# Patient Record
Sex: MALE | Race: WHITE | NOT HISPANIC OR LATINO | Employment: OTHER | URBAN - METROPOLITAN AREA
[De-identification: names, ages, dates, MRNs, and addresses within clinical notes are randomized per-mention and may not be internally consistent; named-entity substitution may affect disease eponyms.]

---

## 2017-01-13 ENCOUNTER — AMBULATORY - HEALTHEAST (OUTPATIENT)
Dept: CARDIOLOGY | Facility: CLINIC | Age: 82
End: 2017-01-13

## 2017-01-13 DIAGNOSIS — I48.20 CHRONIC ATRIAL FIBRILLATION (H): ICD-10-CM

## 2017-01-18 ENCOUNTER — AMBULATORY - HEALTHEAST (OUTPATIENT)
Dept: CARDIOLOGY | Facility: CLINIC | Age: 82
End: 2017-01-18

## 2017-01-18 ENCOUNTER — COMMUNICATION - HEALTHEAST (OUTPATIENT)
Dept: CARDIOLOGY | Facility: CLINIC | Age: 82
End: 2017-01-18

## 2017-01-18 DIAGNOSIS — I48.20 CHRONIC ATRIAL FIBRILLATION (H): ICD-10-CM

## 2017-01-27 ENCOUNTER — AMBULATORY - HEALTHEAST (OUTPATIENT)
Dept: CARDIOLOGY | Facility: CLINIC | Age: 82
End: 2017-01-27

## 2017-01-27 DIAGNOSIS — I48.20 CHRONIC ATRIAL FIBRILLATION (H): ICD-10-CM

## 2017-02-03 ENCOUNTER — AMBULATORY - HEALTHEAST (OUTPATIENT)
Dept: CARDIOLOGY | Facility: CLINIC | Age: 82
End: 2017-02-03

## 2017-02-03 DIAGNOSIS — I48.20 CHRONIC ATRIAL FIBRILLATION (H): ICD-10-CM

## 2017-02-17 ENCOUNTER — AMBULATORY - HEALTHEAST (OUTPATIENT)
Dept: CARDIOLOGY | Facility: CLINIC | Age: 82
End: 2017-02-17

## 2017-02-17 DIAGNOSIS — I48.20 CHRONIC ATRIAL FIBRILLATION (H): ICD-10-CM

## 2017-03-01 ENCOUNTER — AMBULATORY - HEALTHEAST (OUTPATIENT)
Dept: CARDIOLOGY | Facility: CLINIC | Age: 82
End: 2017-03-01

## 2017-03-03 ENCOUNTER — AMBULATORY - HEALTHEAST (OUTPATIENT)
Dept: CARDIOLOGY | Facility: CLINIC | Age: 82
End: 2017-03-03

## 2017-03-03 ENCOUNTER — OFFICE VISIT - HEALTHEAST (OUTPATIENT)
Dept: CARDIOLOGY | Facility: CLINIC | Age: 82
End: 2017-03-03

## 2017-03-03 DIAGNOSIS — I48.20 CHRONIC ATRIAL FIBRILLATION (H): ICD-10-CM

## 2017-03-03 DIAGNOSIS — I51.3 LEFT ATRIAL THROMBUS: ICD-10-CM

## 2017-03-03 DIAGNOSIS — I35.1 AORTIC VALVE INSUFFICIENCY, UNSPECIFIED ETIOLOGY: ICD-10-CM

## 2017-03-03 ASSESSMENT — MIFFLIN-ST. JEOR: SCORE: 1427.67

## 2017-03-08 ENCOUNTER — AMBULATORY - HEALTHEAST (OUTPATIENT)
Dept: CARDIOLOGY | Facility: CLINIC | Age: 82
End: 2017-03-08

## 2017-03-08 ENCOUNTER — SURGERY - HEALTHEAST (OUTPATIENT)
Dept: CARDIOLOGY | Facility: CLINIC | Age: 82
End: 2017-03-08

## 2017-03-08 ENCOUNTER — COMMUNICATION - HEALTHEAST (OUTPATIENT)
Dept: CARDIOLOGY | Facility: CLINIC | Age: 82
End: 2017-03-08

## 2017-03-08 DIAGNOSIS — I48.21 PERMANENT ATRIAL FIBRILLATION (H): ICD-10-CM

## 2017-03-08 DIAGNOSIS — I51.3 LEFT ATRIAL THROMBUS: ICD-10-CM

## 2017-03-17 ENCOUNTER — AMBULATORY - HEALTHEAST (OUTPATIENT)
Dept: CARDIOLOGY | Facility: CLINIC | Age: 82
End: 2017-03-17

## 2017-03-17 DIAGNOSIS — I48.20 CHRONIC ATRIAL FIBRILLATION (H): ICD-10-CM

## 2017-03-20 ENCOUNTER — COMMUNICATION - HEALTHEAST (OUTPATIENT)
Dept: CARDIOLOGY | Facility: CLINIC | Age: 82
End: 2017-03-20

## 2017-03-24 ENCOUNTER — AMBULATORY - HEALTHEAST (OUTPATIENT)
Dept: CARDIOLOGY | Facility: CLINIC | Age: 82
End: 2017-03-24

## 2017-03-24 DIAGNOSIS — I48.20 CHRONIC ATRIAL FIBRILLATION (H): ICD-10-CM

## 2017-03-31 ENCOUNTER — AMBULATORY - HEALTHEAST (OUTPATIENT)
Dept: CARDIOLOGY | Facility: CLINIC | Age: 82
End: 2017-03-31

## 2017-03-31 DIAGNOSIS — I48.20 CHRONIC ATRIAL FIBRILLATION (H): ICD-10-CM

## 2017-04-14 ENCOUNTER — AMBULATORY - HEALTHEAST (OUTPATIENT)
Dept: CARDIOLOGY | Facility: CLINIC | Age: 82
End: 2017-04-14

## 2017-04-14 ENCOUNTER — OFFICE VISIT - HEALTHEAST (OUTPATIENT)
Dept: CARDIOLOGY | Facility: CLINIC | Age: 82
End: 2017-04-14

## 2017-04-14 DIAGNOSIS — Z86.73 HX OF STROKE WITHOUT RESIDUAL DEFICITS: ICD-10-CM

## 2017-04-14 DIAGNOSIS — I51.3 THROMBUS OF LEFT ATRIAL APPENDAGE: ICD-10-CM

## 2017-04-14 ASSESSMENT — MIFFLIN-ST. JEOR: SCORE: 1408.4

## 2017-04-21 ENCOUNTER — HOSPITAL ENCOUNTER (OUTPATIENT)
Dept: MRI IMAGING | Facility: HOSPITAL | Age: 82
Discharge: HOME | End: 2017-04-21

## 2017-04-21 DIAGNOSIS — Z86.73 HX OF STROKE WITHOUT RESIDUAL DEFICITS: ICD-10-CM

## 2017-04-24 ENCOUNTER — COMMUNICATION - HEALTHEAST (OUTPATIENT)
Dept: CARDIOLOGY | Facility: CLINIC | Age: 82
End: 2017-04-24

## 2017-04-27 ENCOUNTER — AMBULATORY - HEALTHEAST (OUTPATIENT)
Dept: CARDIOLOGY | Facility: CLINIC | Age: 82
End: 2017-04-27

## 2017-04-28 ENCOUNTER — AMBULATORY - HEALTHEAST (OUTPATIENT)
Dept: CARDIOLOGY | Facility: CLINIC | Age: 82
End: 2017-04-28

## 2017-04-28 DIAGNOSIS — I48.20 CHRONIC ATRIAL FIBRILLATION (H): ICD-10-CM

## 2017-06-08 ENCOUNTER — COMMUNICATION - HEALTHEAST (OUTPATIENT)
Dept: CARDIOLOGY | Facility: CLINIC | Age: 82
End: 2017-06-08

## 2017-06-08 DIAGNOSIS — I48.91 A-FIB (H): ICD-10-CM

## 2017-06-08 DIAGNOSIS — I51.3 THROMBUS OF LEFT ATRIAL APPENDAGE: ICD-10-CM

## 2017-06-09 ENCOUNTER — COMMUNICATION - HEALTHEAST (OUTPATIENT)
Dept: CARDIOLOGY | Facility: CLINIC | Age: 82
End: 2017-06-09

## 2017-06-13 ENCOUNTER — RECORDS - HEALTHEAST (OUTPATIENT)
Dept: ADMINISTRATIVE | Facility: OTHER | Age: 82
End: 2017-06-13

## 2017-06-15 ENCOUNTER — RECORDS - HEALTHEAST (OUTPATIENT)
Dept: LAB | Facility: CLINIC | Age: 82
End: 2017-06-15

## 2017-06-16 ENCOUNTER — HOSPITAL ENCOUNTER (OUTPATIENT)
Dept: PET IMAGING | Facility: HOSPITAL | Age: 82
Discharge: HOME | End: 2017-06-16
Attending: PSYCHIATRY & NEUROLOGY

## 2017-06-16 DIAGNOSIS — R41.89 COGNITIVE DECLINE: ICD-10-CM

## 2017-06-16 LAB — SYPHILIS RPR SCREEN - HISTORICAL: NORMAL

## 2017-06-16 ASSESSMENT — MIFFLIN-ST. JEOR: SCORE: 1415.21

## 2017-07-13 ENCOUNTER — COMMUNICATION - HEALTHEAST (OUTPATIENT)
Dept: CARDIOLOGY | Facility: CLINIC | Age: 82
End: 2017-07-13

## 2017-08-18 ENCOUNTER — OFFICE VISIT - HEALTHEAST (OUTPATIENT)
Dept: CARDIOLOGY | Facility: CLINIC | Age: 82
End: 2017-08-18

## 2017-08-18 DIAGNOSIS — I51.3 LEFT ATRIAL THROMBUS: ICD-10-CM

## 2017-08-18 ASSESSMENT — MIFFLIN-ST. JEOR: SCORE: 1451.49

## 2017-09-05 ENCOUNTER — COMMUNICATION - HEALTHEAST (OUTPATIENT)
Dept: CARDIOLOGY | Facility: CLINIC | Age: 82
End: 2017-09-05

## 2017-09-05 DIAGNOSIS — I51.3 THROMBUS OF LEFT ATRIAL APPENDAGE: ICD-10-CM

## 2017-09-05 DIAGNOSIS — I48.91 A-FIB (H): ICD-10-CM

## 2017-09-08 ENCOUNTER — RECORDS - HEALTHEAST (OUTPATIENT)
Dept: ADMINISTRATIVE | Facility: OTHER | Age: 82
End: 2017-09-08

## 2017-10-13 ENCOUNTER — AMBULATORY - HEALTHEAST (OUTPATIENT)
Dept: CARDIOLOGY | Facility: CLINIC | Age: 82
End: 2017-10-13

## 2017-10-13 ENCOUNTER — RECORDS - HEALTHEAST (OUTPATIENT)
Dept: ADMINISTRATIVE | Facility: OTHER | Age: 82
End: 2017-10-13

## 2017-10-17 ENCOUNTER — OFFICE VISIT - HEALTHEAST (OUTPATIENT)
Dept: CARDIOLOGY | Facility: CLINIC | Age: 82
End: 2017-10-17

## 2017-10-17 DIAGNOSIS — Z79.01 ALTERATION IN ANTICOAGULATION: ICD-10-CM

## 2017-10-17 DIAGNOSIS — R55 SYNCOPE: ICD-10-CM

## 2017-10-17 DIAGNOSIS — Z51.81 ALTERATION IN ANTICOAGULATION: ICD-10-CM

## 2017-10-17 DIAGNOSIS — I48.20 CHRONIC ATRIAL FIBRILLATION (H): ICD-10-CM

## 2017-10-17 DIAGNOSIS — I51.89 LEFT ATRIAL MASS: ICD-10-CM

## 2017-10-17 ASSESSMENT — MIFFLIN-ST. JEOR: SCORE: 1460.11

## 2017-10-18 ENCOUNTER — COMMUNICATION - HEALTHEAST (OUTPATIENT)
Dept: NURSING | Facility: CLINIC | Age: 82
End: 2017-10-18

## 2017-10-18 DIAGNOSIS — I48.20 CHRONIC ATRIAL FIBRILLATION (H): ICD-10-CM

## 2017-10-23 ENCOUNTER — HOSPITAL ENCOUNTER (OUTPATIENT)
Dept: CARDIOLOGY | Facility: CLINIC | Age: 82
Discharge: HOME | End: 2017-10-23
Attending: INTERNAL MEDICINE

## 2017-10-23 DIAGNOSIS — I48.20 CHRONIC ATRIAL FIBRILLATION (H): ICD-10-CM

## 2017-10-26 ENCOUNTER — RECORDS - HEALTHEAST (OUTPATIENT)
Dept: LAB | Facility: CLINIC | Age: 82
End: 2017-10-26

## 2017-10-26 ENCOUNTER — COMMUNICATION - HEALTHEAST (OUTPATIENT)
Dept: CARDIOLOGY | Facility: CLINIC | Age: 82
End: 2017-10-26

## 2017-10-26 LAB
CHOLEST SERPL-MCNC: 221 MG/DL
FASTING STATUS PATIENT QL REPORTED: NO
HDLC SERPL-MCNC: 41 MG/DL
LDLC SERPL CALC-MCNC: 160 MG/DL
TRIGL SERPL-MCNC: 99 MG/DL

## 2017-10-27 ENCOUNTER — COMMUNICATION - HEALTHEAST (OUTPATIENT)
Dept: CARDIOLOGY | Facility: CLINIC | Age: 82
End: 2017-10-27

## 2017-11-20 ENCOUNTER — COMMUNICATION - HEALTHEAST (OUTPATIENT)
Dept: CARDIOLOGY | Facility: CLINIC | Age: 82
End: 2017-11-20

## 2018-03-07 ENCOUNTER — COMMUNICATION - HEALTHEAST (OUTPATIENT)
Dept: CARDIOLOGY | Facility: CLINIC | Age: 83
End: 2018-03-07

## 2018-03-07 DIAGNOSIS — I48.91 A-FIB (H): ICD-10-CM

## 2018-04-10 ENCOUNTER — RECORDS - HEALTHEAST (OUTPATIENT)
Dept: ADMINISTRATIVE | Facility: OTHER | Age: 83
End: 2018-04-10

## 2018-04-10 ENCOUNTER — AMBULATORY - HEALTHEAST (OUTPATIENT)
Dept: CARDIOLOGY | Facility: CLINIC | Age: 83
End: 2018-04-10

## 2018-04-13 ENCOUNTER — OFFICE VISIT - HEALTHEAST (OUTPATIENT)
Dept: CARDIOLOGY | Facility: CLINIC | Age: 83
End: 2018-04-13

## 2018-04-13 DIAGNOSIS — I48.20 CHRONIC ATRIAL FIBRILLATION (H): ICD-10-CM

## 2018-04-13 DIAGNOSIS — I10 ESSENTIAL HYPERTENSION: ICD-10-CM

## 2018-04-13 ASSESSMENT — MIFFLIN-ST. JEOR: SCORE: 1435.61

## 2018-04-18 ENCOUNTER — COMMUNICATION - HEALTHEAST (OUTPATIENT)
Dept: CARDIOLOGY | Facility: CLINIC | Age: 83
End: 2018-04-18

## 2018-04-19 ENCOUNTER — SURGERY - HEALTHEAST (OUTPATIENT)
Dept: CARDIOLOGY | Facility: CLINIC | Age: 83
End: 2018-04-19

## 2018-04-19 ENCOUNTER — AMBULATORY - HEALTHEAST (OUTPATIENT)
Dept: CARDIOLOGY | Facility: CLINIC | Age: 83
End: 2018-04-19

## 2018-04-19 DIAGNOSIS — I48.91 A-FIB (H): ICD-10-CM

## 2018-04-23 ENCOUNTER — AMBULATORY - HEALTHEAST (OUTPATIENT)
Dept: CARDIOLOGY | Facility: CLINIC | Age: 83
End: 2018-04-23

## 2018-04-23 DIAGNOSIS — Z00.6 RESEARCH EXAM: ICD-10-CM

## 2018-04-23 ASSESSMENT — MIFFLIN-ST. JEOR: SCORE: 1478.13

## 2018-04-24 LAB
ATRIAL RATE - MUSE: 357 BPM
DIASTOLIC BLOOD PRESSURE - MUSE: NORMAL MMHG
INTERPRETATION ECG - MUSE: NORMAL
P AXIS - MUSE: NORMAL DEGREES
PR INTERVAL - MUSE: NORMAL MS
QRS DURATION - MUSE: 96 MS
QT - MUSE: 410 MS
QTC - MUSE: 445 MS
R AXIS - MUSE: -54 DEGREES
SYSTOLIC BLOOD PRESSURE - MUSE: NORMAL MMHG
T AXIS - MUSE: -21 DEGREES
VENTRICULAR RATE- MUSE: 71 BPM

## 2018-04-30 ENCOUNTER — HOSPITAL ENCOUNTER (OUTPATIENT)
Dept: CARDIOLOGY | Facility: CLINIC | Age: 83
Discharge: HOME | End: 2018-04-30
Attending: INTERNAL MEDICINE

## 2018-05-14 ENCOUNTER — RECORDS - HEALTHEAST (OUTPATIENT)
Dept: LAB | Facility: CLINIC | Age: 83
End: 2018-05-14

## 2018-05-14 LAB
ALBUMIN SERPL-MCNC: 3.5 G/DL (ref 3.5–5)
ALP SERPL-CCNC: 65 U/L (ref 45–120)
ALT SERPL W P-5'-P-CCNC: 17 U/L (ref 0–45)
ANION GAP SERPL CALCULATED.3IONS-SCNC: 9 MMOL/L (ref 5–18)
AST SERPL W P-5'-P-CCNC: 23 U/L (ref 0–40)
BILIRUB SERPL-MCNC: 1 MG/DL (ref 0–1)
BUN SERPL-MCNC: 22 MG/DL (ref 8–28)
CALCIUM SERPL-MCNC: 10.2 MG/DL (ref 8.5–10.5)
CHLORIDE BLD-SCNC: 104 MMOL/L (ref 98–107)
CO2 SERPL-SCNC: 28 MMOL/L (ref 22–31)
CREAT SERPL-MCNC: 1.03 MG/DL (ref 0.7–1.3)
GFR SERPL CREATININE-BSD FRML MDRD: >60 ML/MIN/1.73M2
GLUCOSE BLD-MCNC: 79 MG/DL (ref 70–125)
POTASSIUM BLD-SCNC: 4.7 MMOL/L (ref 3.5–5)
PROT SERPL-MCNC: 7 G/DL (ref 6–8)
SODIUM SERPL-SCNC: 141 MMOL/L (ref 136–145)

## 2018-05-16 ENCOUNTER — COMMUNICATION - HEALTHEAST (OUTPATIENT)
Dept: CARDIOLOGY | Facility: CLINIC | Age: 83
End: 2018-05-16

## 2018-05-22 ENCOUNTER — RECORDS - HEALTHEAST (OUTPATIENT)
Dept: ADMINISTRATIVE | Facility: OTHER | Age: 83
End: 2018-05-22

## 2018-05-22 ENCOUNTER — AMBULATORY - HEALTHEAST (OUTPATIENT)
Dept: CARDIOLOGY | Facility: CLINIC | Age: 83
End: 2018-05-22

## 2018-05-29 ENCOUNTER — OFFICE VISIT - HEALTHEAST (OUTPATIENT)
Dept: CARDIOLOGY | Facility: CLINIC | Age: 83
End: 2018-05-29

## 2018-05-29 DIAGNOSIS — Z51.81 ALTERATION IN ANTICOAGULATION: ICD-10-CM

## 2018-05-29 DIAGNOSIS — I48.20 CHRONIC ATRIAL FIBRILLATION (H): ICD-10-CM

## 2018-05-29 DIAGNOSIS — I51.89 LEFT ATRIAL MASS: ICD-10-CM

## 2018-05-29 DIAGNOSIS — Z79.01 ALTERATION IN ANTICOAGULATION: ICD-10-CM

## 2018-05-29 DIAGNOSIS — I35.1 AORTIC VALVE INSUFFICIENCY, ETIOLOGY OF CARDIAC VALVE DISEASE UNSPECIFIED: ICD-10-CM

## 2018-05-29 ASSESSMENT — MIFFLIN-ST. JEOR: SCORE: 1460.57

## 2018-06-02 ENCOUNTER — COMMUNICATION - HEALTHEAST (OUTPATIENT)
Dept: CARDIOLOGY | Facility: CLINIC | Age: 83
End: 2018-06-02

## 2018-06-02 DIAGNOSIS — I48.91 A-FIB (H): ICD-10-CM

## 2018-08-22 ENCOUNTER — COMMUNICATION - HEALTHEAST (OUTPATIENT)
Dept: NURSING | Facility: CLINIC | Age: 83
End: 2018-08-22

## 2018-08-22 DIAGNOSIS — I48.20 CHRONIC ATRIAL FIBRILLATION (H): ICD-10-CM

## 2018-09-24 ENCOUNTER — OFFICE VISIT - HEALTHEAST (OUTPATIENT)
Dept: GERIATRICS | Facility: CLINIC | Age: 83
End: 2018-09-24

## 2018-09-24 ENCOUNTER — AMBULATORY - HEALTHEAST (OUTPATIENT)
Dept: ADMINISTRATIVE | Facility: CLINIC | Age: 83
End: 2018-09-24

## 2018-09-24 DIAGNOSIS — I63.81 THALAMIC INFARCTION (H): ICD-10-CM

## 2018-09-24 DIAGNOSIS — Z71.89 ADVANCED CARE PLANNING/COUNSELING DISCUSSION: ICD-10-CM

## 2018-09-24 DIAGNOSIS — I51.89 LEFT ATRIAL MASS: ICD-10-CM

## 2018-09-24 RX ORDER — CHLORAL HYDRATE 500 MG
2 CAPSULE ORAL DAILY
Status: SHIPPED | COMMUNITY
Start: 2018-09-24

## 2018-09-24 RX ORDER — MAGNESIUM 200 MG
200 TABLET ORAL DAILY
Status: ON HOLD | COMMUNITY
Start: 2018-09-24 | End: 2023-01-01

## 2018-09-24 RX ORDER — MULTIVIT-MIN/IRON FUM/FOLIC AC 7.5 MG-4
1 TABLET ORAL DAILY
Status: SHIPPED | COMMUNITY
Start: 2018-09-24 | End: 2023-01-01

## 2018-09-24 RX ORDER — ASCORBIC ACID 500 MG
500 TABLET ORAL DAILY
Status: ON HOLD | COMMUNITY
Start: 2018-09-24 | End: 2023-01-01

## 2018-09-24 RX ORDER — VIT C/HESPERIDIN/BIOFLAVONOIDS 500-100 MG
30 TABLET ORAL DAILY PRN
Status: ON HOLD | COMMUNITY
Start: 2018-09-24 | End: 2023-01-01

## 2018-09-25 ENCOUNTER — OFFICE VISIT - HEALTHEAST (OUTPATIENT)
Dept: GERIATRICS | Facility: CLINIC | Age: 83
End: 2018-09-25

## 2018-09-25 DIAGNOSIS — H35.30 MACULAR DEGENERATION: ICD-10-CM

## 2018-09-25 DIAGNOSIS — I63.81 THALAMIC INFARCTION (H): ICD-10-CM

## 2018-09-25 DIAGNOSIS — E78.5 HYPERLIPIDEMIA LDL GOAL <70: ICD-10-CM

## 2018-09-25 DIAGNOSIS — I48.20 CHRONIC ATRIAL FIBRILLATION (H): ICD-10-CM

## 2018-09-25 DIAGNOSIS — I51.89 LEFT ATRIAL MASS: ICD-10-CM

## 2018-09-26 ENCOUNTER — OFFICE VISIT - HEALTHEAST (OUTPATIENT)
Dept: GERIATRICS | Facility: CLINIC | Age: 83
End: 2018-09-26

## 2018-09-26 DIAGNOSIS — I63.81 THALAMIC INFARCTION (H): ICD-10-CM

## 2018-09-26 DIAGNOSIS — I51.89 LEFT ATRIAL MASS: ICD-10-CM

## 2018-10-02 ENCOUNTER — OFFICE VISIT - HEALTHEAST (OUTPATIENT)
Dept: GERIATRICS | Facility: CLINIC | Age: 83
End: 2018-10-02

## 2018-10-02 DIAGNOSIS — I63.81 THALAMIC INFARCTION (H): ICD-10-CM

## 2018-10-02 DIAGNOSIS — H35.30 MACULAR DEGENERATION: ICD-10-CM

## 2018-10-02 DIAGNOSIS — I48.20 CHRONIC ATRIAL FIBRILLATION (H): ICD-10-CM

## 2018-10-02 DIAGNOSIS — I51.89 LEFT ATRIAL MASS: ICD-10-CM

## 2018-10-02 DIAGNOSIS — E78.5 HYPERLIPIDEMIA LDL GOAL <70: ICD-10-CM

## 2018-10-10 ENCOUNTER — OFFICE VISIT - HEALTHEAST (OUTPATIENT)
Dept: GERIATRICS | Facility: CLINIC | Age: 83
End: 2018-10-10

## 2018-10-10 DIAGNOSIS — I63.81 THALAMIC INFARCTION (H): ICD-10-CM

## 2018-10-10 DIAGNOSIS — I51.89 LEFT ATRIAL MASS: ICD-10-CM

## 2018-10-12 ENCOUNTER — AMBULATORY - HEALTHEAST (OUTPATIENT)
Dept: GERIATRICS | Facility: CLINIC | Age: 83
End: 2018-10-12

## 2018-10-15 ENCOUNTER — RECORDS - HEALTHEAST (OUTPATIENT)
Dept: LAB | Facility: CLINIC | Age: 83
End: 2018-10-15

## 2018-10-15 LAB
ALBUMIN SERPL-MCNC: 3.7 G/DL (ref 3.5–5)
ALP SERPL-CCNC: 66 U/L (ref 45–120)
ALT SERPL W P-5'-P-CCNC: 16 U/L (ref 0–45)
ANION GAP SERPL CALCULATED.3IONS-SCNC: 9 MMOL/L (ref 5–18)
AST SERPL W P-5'-P-CCNC: 19 U/L (ref 0–40)
BILIRUB SERPL-MCNC: 1 MG/DL (ref 0–1)
BUN SERPL-MCNC: 20 MG/DL (ref 8–28)
CALCIUM SERPL-MCNC: 9.8 MG/DL (ref 8.5–10.5)
CHLORIDE BLD-SCNC: 105 MMOL/L (ref 98–107)
CHOLEST SERPL-MCNC: 154 MG/DL
CO2 SERPL-SCNC: 28 MMOL/L (ref 22–31)
CREAT SERPL-MCNC: 1.12 MG/DL (ref 0.7–1.3)
FASTING STATUS PATIENT QL REPORTED: NO
GFR SERPL CREATININE-BSD FRML MDRD: >60 ML/MIN/1.73M2
GLUCOSE BLD-MCNC: 93 MG/DL (ref 70–125)
HDLC SERPL-MCNC: 43 MG/DL
LDLC SERPL CALC-MCNC: 89 MG/DL
POTASSIUM BLD-SCNC: 4.8 MMOL/L (ref 3.5–5)
PROT SERPL-MCNC: 6.6 G/DL (ref 6–8)
SODIUM SERPL-SCNC: 142 MMOL/L (ref 136–145)
TRIGL SERPL-MCNC: 111 MG/DL

## 2019-03-21 ENCOUNTER — RECORDS - HEALTHEAST (OUTPATIENT)
Dept: LAB | Facility: CLINIC | Age: 84
End: 2019-03-21

## 2019-03-21 LAB
CHOLEST SERPL-MCNC: 135 MG/DL
FASTING STATUS PATIENT QL REPORTED: NO
HDLC SERPL-MCNC: 49 MG/DL
LDLC SERPL CALC-MCNC: 64 MG/DL
TRIGL SERPL-MCNC: 112 MG/DL

## 2019-04-25 ENCOUNTER — COMMUNICATION - HEALTHEAST (OUTPATIENT)
Dept: ANTICOAGULATION | Facility: CLINIC | Age: 84
End: 2019-04-25

## 2019-04-25 DIAGNOSIS — I48.20 CHRONIC ATRIAL FIBRILLATION (H): ICD-10-CM

## 2019-05-21 ENCOUNTER — RECORDS - HEALTHEAST (OUTPATIENT)
Dept: LAB | Facility: CLINIC | Age: 84
End: 2019-05-21

## 2019-05-21 LAB
ALBUMIN SERPL-MCNC: 3.7 G/DL (ref 3.5–5)
ALP SERPL-CCNC: 61 U/L (ref 45–120)
ALT SERPL W P-5'-P-CCNC: 13 U/L (ref 0–45)
ANION GAP SERPL CALCULATED.3IONS-SCNC: 12 MMOL/L (ref 5–18)
AST SERPL W P-5'-P-CCNC: 26 U/L (ref 0–40)
BILIRUB SERPL-MCNC: 1.3 MG/DL (ref 0–1)
BUN SERPL-MCNC: 21 MG/DL (ref 8–28)
CALCIUM SERPL-MCNC: 9.7 MG/DL (ref 8.5–10.5)
CHLORIDE BLD-SCNC: 105 MMOL/L (ref 98–107)
CHOLEST SERPL-MCNC: 153 MG/DL
CO2 SERPL-SCNC: 24 MMOL/L (ref 22–31)
CREAT SERPL-MCNC: 1.23 MG/DL (ref 0.7–1.3)
FASTING STATUS PATIENT QL REPORTED: YES
GFR SERPL CREATININE-BSD FRML MDRD: 56 ML/MIN/1.73M2
GLUCOSE BLD-MCNC: 101 MG/DL (ref 70–125)
HDLC SERPL-MCNC: 58 MG/DL
LDLC SERPL CALC-MCNC: 82 MG/DL
POTASSIUM BLD-SCNC: 4 MMOL/L (ref 3.5–5)
PROT SERPL-MCNC: 6.7 G/DL (ref 6–8)
SODIUM SERPL-SCNC: 141 MMOL/L (ref 136–145)
TRIGL SERPL-MCNC: 64 MG/DL

## 2019-05-24 ENCOUNTER — COMMUNICATION - HEALTHEAST (OUTPATIENT)
Dept: CARDIOLOGY | Facility: CLINIC | Age: 84
End: 2019-05-24

## 2019-05-24 DIAGNOSIS — I48.20 CHRONIC ATRIAL FIBRILLATION (H): ICD-10-CM

## 2019-05-24 RX ORDER — APIXABAN 5 MG/1
TABLET, FILM COATED ORAL
Qty: 60 TABLET | Refills: 4 | Status: ON HOLD | OUTPATIENT
Start: 2019-05-24 | End: 2023-01-01

## 2019-06-24 ENCOUNTER — COMMUNICATION - HEALTHEAST (OUTPATIENT)
Dept: CARDIOLOGY | Facility: CLINIC | Age: 84
End: 2019-06-24

## 2019-06-24 DIAGNOSIS — I48.91 A-FIB (H): ICD-10-CM

## 2019-07-31 ENCOUNTER — COMMUNICATION - HEALTHEAST (OUTPATIENT)
Dept: CARDIOLOGY | Facility: CLINIC | Age: 84
End: 2019-07-31

## 2019-07-31 DIAGNOSIS — E78.5 HYPERLIPIDEMIA: ICD-10-CM

## 2019-07-31 RX ORDER — SIMVASTATIN 40 MG
TABLET ORAL
Qty: 90 TABLET | Refills: 0 | Status: ON HOLD | OUTPATIENT
Start: 2019-07-31 | End: 2021-08-13

## 2019-09-10 ENCOUNTER — COMMUNICATION - HEALTHEAST (OUTPATIENT)
Dept: CARDIOLOGY | Facility: CLINIC | Age: 84
End: 2019-09-10

## 2019-10-01 ENCOUNTER — COMMUNICATION - HEALTHEAST (OUTPATIENT)
Dept: CARDIOLOGY | Facility: CLINIC | Age: 84
End: 2019-10-01

## 2019-10-03 ENCOUNTER — AMBULATORY - HEALTHEAST (OUTPATIENT)
Dept: CARDIOLOGY | Facility: CLINIC | Age: 84
End: 2019-10-03

## 2019-10-03 DIAGNOSIS — Z00.6 CLINICAL TRIAL EXAM: ICD-10-CM

## 2019-10-03 DIAGNOSIS — I48.20 CHRONIC ATRIAL FIBRILLATION (H): ICD-10-CM

## 2019-10-03 DIAGNOSIS — Z00.6 RESEARCH EXAM: ICD-10-CM

## 2019-10-03 LAB
ATRIAL RATE - MUSE: NORMAL
DIASTOLIC BLOOD PRESSURE - MUSE: NORMAL
INTERPRETATION ECG - MUSE: NORMAL
P AXIS - MUSE: NORMAL
PR INTERVAL - MUSE: NORMAL
QRS DURATION - MUSE: 98 MS
QT - MUSE: 432 MS
QTC - MUSE: 462 MS
R AXIS - MUSE: 40 DEGREES
SYSTOLIC BLOOD PRESSURE - MUSE: NORMAL
T AXIS - MUSE: -32 DEGREES
VENTRICULAR RATE- MUSE: 69 BPM

## 2019-10-03 ASSESSMENT — MIFFLIN-ST. JEOR: SCORE: 1387.53

## 2019-10-07 ENCOUNTER — AMBULATORY - HEALTHEAST (OUTPATIENT)
Dept: CARDIOLOGY | Facility: CLINIC | Age: 84
End: 2019-10-07

## 2019-11-05 ENCOUNTER — COMMUNICATION - HEALTHEAST (OUTPATIENT)
Dept: CARDIOLOGY | Facility: CLINIC | Age: 84
End: 2019-11-05

## 2019-11-05 DIAGNOSIS — I48.20 CHRONIC ATRIAL FIBRILLATION (H): ICD-10-CM

## 2019-11-29 ENCOUNTER — COMMUNICATION - HEALTHEAST (OUTPATIENT)
Dept: CARDIOLOGY | Facility: CLINIC | Age: 84
End: 2019-11-29

## 2019-11-29 DIAGNOSIS — E78.5 HYPERLIPIDEMIA: ICD-10-CM

## 2019-12-02 ENCOUNTER — RECORDS - HEALTHEAST (OUTPATIENT)
Dept: LAB | Facility: CLINIC | Age: 84
End: 2019-12-02

## 2019-12-02 LAB
ALBUMIN SERPL-MCNC: 3.2 G/DL (ref 3.5–5)
ALP SERPL-CCNC: 71 U/L (ref 45–120)
ALT SERPL W P-5'-P-CCNC: 26 U/L (ref 0–45)
ANION GAP SERPL CALCULATED.3IONS-SCNC: 13 MMOL/L (ref 5–18)
AST SERPL W P-5'-P-CCNC: 37 U/L (ref 0–40)
BILIRUB SERPL-MCNC: 1.2 MG/DL (ref 0–1)
BUN SERPL-MCNC: 35 MG/DL (ref 8–28)
CALCIUM SERPL-MCNC: 9.6 MG/DL (ref 8.5–10.5)
CHLORIDE BLD-SCNC: 100 MMOL/L (ref 98–107)
CO2 SERPL-SCNC: 25 MMOL/L (ref 22–31)
CREAT SERPL-MCNC: 1.3 MG/DL (ref 0.7–1.3)
GFR SERPL CREATININE-BSD FRML MDRD: 53 ML/MIN/1.73M2
GLUCOSE BLD-MCNC: 136 MG/DL (ref 70–125)
POTASSIUM BLD-SCNC: 5.1 MMOL/L (ref 3.5–5)
PROT SERPL-MCNC: 6.5 G/DL (ref 6–8)
SODIUM SERPL-SCNC: 138 MMOL/L (ref 136–145)

## 2019-12-04 ENCOUNTER — RECORDS - HEALTHEAST (OUTPATIENT)
Dept: ADMINISTRATIVE | Facility: OTHER | Age: 84
End: 2019-12-04

## 2019-12-05 ENCOUNTER — COMMUNICATION - HEALTHEAST (OUTPATIENT)
Dept: CARDIOLOGY | Facility: CLINIC | Age: 84
End: 2019-12-05

## 2019-12-05 DIAGNOSIS — E78.5 HYPERLIPIDEMIA: ICD-10-CM

## 2019-12-06 ENCOUNTER — HOSPITAL ENCOUNTER (OUTPATIENT)
Dept: ULTRASOUND IMAGING | Facility: CLINIC | Age: 84
Discharge: HOME | End: 2019-12-06
Attending: FAMILY MEDICINE | Admitting: RADIOLOGY

## 2019-12-06 ENCOUNTER — AMBULATORY - HEALTHEAST (OUTPATIENT)
Dept: LAB | Facility: CLINIC | Age: 84
End: 2019-12-06

## 2019-12-06 DIAGNOSIS — J90 PLEURAL EFFUSION: ICD-10-CM

## 2019-12-06 DIAGNOSIS — J90 PLEURAL EFFUSION, NOT ELSEWHERE CLASSIFIED: ICD-10-CM

## 2019-12-06 LAB
INR PPP: 1.31 (ref 0.9–1.1)
PLATELET # BLD AUTO: 327 THOU/UL (ref 140–440)

## 2019-12-13 LAB
CAP COMMENT: ABNORMAL
LAB AP CHARGES (HE HISTORICAL CONVERSION): ABNORMAL
LAB MED GENERAL PATH INTERP (HE HISTORICAL CONVERSION): ABNORMAL
PATH REPORT.ADDENDUM SPEC: ABNORMAL
PATH REPORT.ADDENDUM SPEC: ABNORMAL
PATH REPORT.COMMENTS IMP SPEC: ABNORMAL
PATH REPORT.COMMENTS IMP SPEC: ABNORMAL
PATH REPORT.FINAL DX SPEC: ABNORMAL
PATH REPORT.MICROSCOPIC SPEC OTHER STN: ABNORMAL
PATH REPORT.MICROSCOPIC SPEC OTHER STN: ABNORMAL
PATH REPORT.RELEVANT HX SPEC: ABNORMAL
SPECIMEN DESCRIPTION: ABNORMAL

## 2019-12-27 ENCOUNTER — RECORDS - HEALTHEAST (OUTPATIENT)
Dept: ADMINISTRATIVE | Facility: OTHER | Age: 84
End: 2019-12-27

## 2020-01-07 ENCOUNTER — RECORDS - HEALTHEAST (OUTPATIENT)
Dept: ADMINISTRATIVE | Facility: OTHER | Age: 85
End: 2020-01-07

## 2020-01-08 ENCOUNTER — RECORDS - HEALTHEAST (OUTPATIENT)
Dept: ADMINISTRATIVE | Facility: OTHER | Age: 85
End: 2020-01-08

## 2020-01-10 ENCOUNTER — RECORDS - HEALTHEAST (OUTPATIENT)
Dept: ADMINISTRATIVE | Facility: OTHER | Age: 85
End: 2020-01-10

## 2020-01-13 ENCOUNTER — RECORDS - HEALTHEAST (OUTPATIENT)
Dept: ADMINISTRATIVE | Facility: OTHER | Age: 85
End: 2020-01-13

## 2020-01-14 ENCOUNTER — COMMUNICATION - HEALTHEAST (OUTPATIENT)
Dept: TELEHEALTH | Facility: CLINIC | Age: 85
End: 2020-01-14

## 2020-01-14 ENCOUNTER — HOSPITAL ENCOUNTER (OUTPATIENT)
Dept: INTERVENTIONAL RADIOLOGY/VASCULAR | Facility: HOSPITAL | Age: 85
Discharge: HOME | End: 2020-01-14
Attending: INTERNAL MEDICINE | Admitting: RADIOLOGY

## 2020-01-14 DIAGNOSIS — C85.90 LYMPHOMA (H): ICD-10-CM

## 2020-01-14 ASSESSMENT — MIFFLIN-ST. JEOR: SCORE: 1372.11

## 2020-01-17 LAB — BACTERIA SPEC CULT: NORMAL

## 2020-01-20 ENCOUNTER — HOSPITAL ENCOUNTER (OUTPATIENT)
Dept: CARDIOLOGY | Facility: CLINIC | Age: 85
Discharge: HOME | End: 2020-01-20
Attending: INTERNAL MEDICINE

## 2020-01-20 ENCOUNTER — RECORDS - HEALTHEAST (OUTPATIENT)
Dept: ADMINISTRATIVE | Facility: OTHER | Age: 85
End: 2020-01-20

## 2020-01-20 DIAGNOSIS — Z01.818 EXAMINATION PRIOR TO CHEMOTHERAPY: ICD-10-CM

## 2020-01-20 LAB
AORTIC ROOT: 4 CM
AORTIC VALVE MEAN VELOCITY: 81.9 CM/S
AR DECEL SLOPE: 2080 MM/S2
AR PEAK VELOCITY: 449 CM/S
ASCENDING AORTA: 4 CM
AV DIMENSIONLESS INDEX VTI: 0.5
AV MEAN GRADIENT: 3 MMHG
AV PEAK GRADIENT: 4.5 MMHG
AV REGURGITANT PEAK GRADIENT: 80.6 MMHG
AV REGURGITATION PRESSURE HALF TIME: 633 MS
AV VALVE AREA: 2.4 CM2
AV VELOCITY RATIO: 0.6
BSA FOR ECHO PROCEDURE: 1.84 M2
CV BLOOD PRESSURE: NORMAL MMHG
CV ECHO HEIGHT: 68 IN
CV ECHO WEIGHT: 156 LBS
DOP CALC AO PEAK VEL: 106 CM/S
DOP CALC AO VTI: 20.4 CM
DOP CALC LVOT AREA: 4.52 CM2
DOP CALC LVOT DIAMETER: 2.4 CM
DOP CALC LVOT PEAK VEL: 66.4 CM/S
DOP CALC LVOT STROKE VOLUME: 49.3 CM3
DOP CALCLVOT PEAK VEL VTI: 10.9 CM
EJECTION FRACTION: 57 % (ref 55–75)
LA AREA 1: 28 CM2
LA AREA 2: 23.8 CM2
LEFT ATRIUM LENGTH: 5.97 CM
LEFT ATRIUM SIZE: 4.4 CM
LEFT ATRIUM TO AORTIC ROOT RATIO: 1.1 NO UNITS
LEFT ATRIUM VOLUME INDEX: 51.6 ML/M2
LEFT ATRIUM VOLUME: 94.9 ML
LEFT VENTRICLE DIASTOLIC VOLUME INDEX: 48.4 CM3/M2 (ref 34–74)
LEFT VENTRICLE DIASTOLIC VOLUME: 89 CM3 (ref 62–150)
LEFT VENTRICLE SYSTOLIC VOLUME INDEX: 20.7 CM3/M2 (ref 11–31)
LEFT VENTRICLE SYSTOLIC VOLUME: 38 CM3 (ref 21–61)
LEFT VENTRICULAR OUTFLOW TRACT MEAN GRADIENT: 1 MMHG
LEFT VENTRICULAR OUTFLOW TRACT MEAN VELOCITY: 49 CM/S
LEFT VENTRICULAR OUTFLOW TRACT PEAK GRADIENT: 2 MMHG
LV STROKE VOLUME INDEX: 26.8 ML/M2
MV AVERAGE E/E' RATIO: 14.5 CM/S
MV DECELERATION TIME: 151 MS
MV E'TISSUE VEL-LAT: 6.86 CM/S
MV E'TISSUE VEL-MED: 4.58 CM/S
MV LATERAL E/E' RATIO: 12.1
MV MEDIAL E/E' RATIO: 18.1
MV PEAK E VELOCITY: 83.1 CM/S
NUC REST DIASTOLIC VOLUME INDEX: 2496 LBS
NUC REST SYSTOLIC VOLUME INDEX: 68 IN
TRICUSPID VALVE ANULAR PLANE SYSTOLIC EXCURSION: 1.2 CM

## 2020-01-20 ASSESSMENT — MIFFLIN-ST. JEOR: SCORE: 1372.11

## 2020-05-12 ENCOUNTER — RECORDS - HEALTHEAST (OUTPATIENT)
Dept: ADMINISTRATIVE | Facility: OTHER | Age: 85
End: 2020-05-12

## 2020-05-27 ENCOUNTER — HOSPITAL ENCOUNTER (OUTPATIENT)
Dept: CT IMAGING | Facility: CLINIC | Age: 85
Discharge: HOME | End: 2020-05-27
Attending: INTERNAL MEDICINE

## 2020-05-27 DIAGNOSIS — C83.30 DIFFUSE LARGE B-CELL LYMPHOMA (H): ICD-10-CM

## 2020-05-27 LAB
CREAT BLD-MCNC: 0.8 MG/DL (ref 0.7–1.3)
GFR SERPL CREATININE-BSD FRML MDRD: >60 ML/MIN/1.73M2

## 2020-06-05 ENCOUNTER — RECORDS - HEALTHEAST (OUTPATIENT)
Dept: ADMINISTRATIVE | Facility: OTHER | Age: 85
End: 2020-06-05

## 2020-06-06 ENCOUNTER — RECORDS - HEALTHEAST (OUTPATIENT)
Dept: ADMINISTRATIVE | Facility: OTHER | Age: 85
End: 2020-06-06

## 2020-06-16 ENCOUNTER — RECORDS - HEALTHEAST (OUTPATIENT)
Dept: ADMINISTRATIVE | Facility: OTHER | Age: 85
End: 2020-06-16

## 2020-06-16 ENCOUNTER — AMBULATORY - HEALTHEAST (OUTPATIENT)
Dept: INTERVENTIONAL RADIOLOGY/VASCULAR | Facility: HOSPITAL | Age: 85
End: 2020-06-16

## 2020-06-16 DIAGNOSIS — Z11.59 ENCOUNTER FOR SCREENING FOR OTHER VIRAL DISEASES: ICD-10-CM

## 2020-06-17 ENCOUNTER — AMBULATORY - HEALTHEAST (OUTPATIENT)
Dept: FAMILY MEDICINE | Facility: CLINIC | Age: 85
End: 2020-06-17

## 2020-06-17 ENCOUNTER — COMMUNICATION - HEALTHEAST (OUTPATIENT)
Dept: INTERVENTIONAL RADIOLOGY/VASCULAR | Facility: HOSPITAL | Age: 85
End: 2020-06-17

## 2020-06-17 DIAGNOSIS — Z11.59 ENCOUNTER FOR SCREENING FOR OTHER VIRAL DISEASES: ICD-10-CM

## 2020-06-19 ENCOUNTER — HOSPITAL ENCOUNTER (OUTPATIENT)
Dept: INTERVENTIONAL RADIOLOGY/VASCULAR | Facility: HOSPITAL | Age: 85
Discharge: HOME | End: 2020-06-19
Attending: INTERNAL MEDICINE | Admitting: RADIOLOGY

## 2020-06-19 DIAGNOSIS — C85.90 LYMPHOMA (H): ICD-10-CM

## 2020-06-19 ASSESSMENT — MIFFLIN-ST. JEOR: SCORE: 1374.38

## 2020-06-29 ENCOUNTER — COMMUNICATION - HEALTHEAST (OUTPATIENT)
Dept: CARDIOLOGY | Facility: CLINIC | Age: 85
End: 2020-06-29

## 2020-06-29 DIAGNOSIS — I48.91 A-FIB (H): ICD-10-CM

## 2020-08-07 ENCOUNTER — RECORDS - HEALTHEAST (OUTPATIENT)
Dept: LAB | Facility: CLINIC | Age: 85
End: 2020-08-07

## 2020-08-07 LAB
ALBUMIN SERPL-MCNC: 3.8 G/DL (ref 3.5–5)
ALP SERPL-CCNC: 73 U/L (ref 45–120)
ALT SERPL W P-5'-P-CCNC: 21 U/L (ref 0–45)
ANION GAP SERPL CALCULATED.3IONS-SCNC: 10 MMOL/L (ref 5–18)
AST SERPL W P-5'-P-CCNC: 20 U/L (ref 0–40)
BILIRUB SERPL-MCNC: 0.7 MG/DL (ref 0–1)
BUN SERPL-MCNC: 20 MG/DL (ref 8–28)
CALCIUM SERPL-MCNC: 9.4 MG/DL (ref 8.5–10.5)
CHLORIDE BLD-SCNC: 104 MMOL/L (ref 98–107)
CO2 SERPL-SCNC: 27 MMOL/L (ref 22–31)
CREAT SERPL-MCNC: 1.11 MG/DL (ref 0.7–1.3)
GFR SERPL CREATININE-BSD FRML MDRD: >60 ML/MIN/1.73M2
GLUCOSE BLD-MCNC: 130 MG/DL (ref 70–125)
POTASSIUM BLD-SCNC: 4 MMOL/L (ref 3.5–5)
PROT SERPL-MCNC: 6.4 G/DL (ref 6–8)
SODIUM SERPL-SCNC: 141 MMOL/L (ref 136–145)

## 2020-08-25 ENCOUNTER — COMMUNICATION - HEALTHEAST (OUTPATIENT)
Dept: CARDIOLOGY | Facility: CLINIC | Age: 85
End: 2020-08-25

## 2020-09-30 ENCOUNTER — RECORDS - HEALTHEAST (OUTPATIENT)
Dept: ADMINISTRATIVE | Facility: OTHER | Age: 85
End: 2020-09-30

## 2020-12-01 ENCOUNTER — HOSPITAL ENCOUNTER (OUTPATIENT)
Dept: CT IMAGING | Facility: HOSPITAL | Age: 85
Discharge: HOME | End: 2020-12-01
Attending: INTERNAL MEDICINE

## 2020-12-01 DIAGNOSIS — C83.30 LYMPHOMA, LARGE-CELL, DIFFUSE (H): ICD-10-CM

## 2021-02-19 ENCOUNTER — RECORDS - HEALTHEAST (OUTPATIENT)
Dept: LAB | Facility: CLINIC | Age: 86
End: 2021-02-19

## 2021-02-19 LAB
ALBUMIN SERPL-MCNC: 3.6 G/DL (ref 3.5–5)
ALP SERPL-CCNC: 78 U/L (ref 45–120)
ALT SERPL W P-5'-P-CCNC: 21 U/L (ref 0–45)
ANION GAP SERPL CALCULATED.3IONS-SCNC: 9 MMOL/L (ref 5–18)
AST SERPL W P-5'-P-CCNC: 17 U/L (ref 0–40)
BILIRUB SERPL-MCNC: 0.7 MG/DL (ref 0–1)
BUN SERPL-MCNC: 28 MG/DL (ref 8–28)
CALCIUM SERPL-MCNC: 9.2 MG/DL (ref 8.5–10.5)
CHLORIDE BLD-SCNC: 106 MMOL/L (ref 98–107)
CHOLEST SERPL-MCNC: 120 MG/DL
CO2 SERPL-SCNC: 26 MMOL/L (ref 22–31)
CREAT SERPL-MCNC: 1.11 MG/DL (ref 0.7–1.3)
FASTING STATUS PATIENT QL REPORTED: NO
GFR SERPL CREATININE-BSD FRML MDRD: >60 ML/MIN/1.73M2
GLUCOSE BLD-MCNC: 127 MG/DL (ref 70–125)
HDLC SERPL-MCNC: 41 MG/DL
LDLC SERPL CALC-MCNC: 63 MG/DL
POTASSIUM BLD-SCNC: 4.4 MMOL/L (ref 3.5–5)
PROT SERPL-MCNC: 6.1 G/DL (ref 6–8)
SODIUM SERPL-SCNC: 141 MMOL/L (ref 136–145)
TRIGL SERPL-MCNC: 80 MG/DL

## 2021-04-02 ENCOUNTER — RECORDS - HEALTHEAST (OUTPATIENT)
Dept: ADMINISTRATIVE | Facility: OTHER | Age: 86
End: 2021-04-02

## 2021-05-02 ENCOUNTER — HEALTH MAINTENANCE LETTER (OUTPATIENT)
Age: 86
End: 2021-05-02

## 2021-05-30 VITALS — HEIGHT: 69 IN | BODY MASS INDEX: 23.77 KG/M2 | WEIGHT: 160.5 LBS

## 2021-05-30 VITALS — WEIGHT: 163 LBS | HEIGHT: 70 IN | BODY MASS INDEX: 23.34 KG/M2

## 2021-05-31 VITALS — WEIGHT: 171.9 LBS | BODY MASS INDEX: 25.46 KG/M2 | HEIGHT: 69 IN

## 2021-05-31 VITALS — HEIGHT: 69 IN | BODY MASS INDEX: 25.18 KG/M2 | WEIGHT: 170 LBS

## 2021-05-31 VITALS — HEIGHT: 69 IN | WEIGHT: 162 LBS | BODY MASS INDEX: 23.99 KG/M2

## 2021-06-01 VITALS — WEIGHT: 170 LBS | BODY MASS INDEX: 25.76 KG/M2 | HEIGHT: 68 IN

## 2021-06-01 VITALS — BODY MASS INDEX: 25.48 KG/M2 | HEIGHT: 69 IN | WEIGHT: 172 LBS

## 2021-06-01 VITALS — BODY MASS INDEX: 25.92 KG/M2 | WEIGHT: 175 LBS | HEIGHT: 69 IN

## 2021-06-01 NOTE — TELEPHONE ENCOUNTER
Zestar Study Consent Visit    Study description: ECG and PPG Study: Zestar Study      Son Huizar a 84 y.o. male , was contacted by today to discuss participation in the Zestar study.     The patient called the Clinical Trials Office to inquire about study participation.  The consent form was reviewed with the patient.     The review of the study included:    Study purpose     Conflict of interest    Device description      Study visits    Risks of participation    Benefits (if any)    Alternatives    Voluntary participation    Confidentiality     Compensation/costs of participation    Study stipends    Injury and legal rights    The subject was queried in regards to his willingness to continue and come in for scheduled appointment. his questions were answered to his satisfaction.   The patient has given his preliminary agreement to volunteer to participate in the above noted study.     Plan: [facility] to continue consent process. If he continues to agrees to participate, the study visit will be done on the same day.    Sol Marlow RN

## 2021-06-02 VITALS — BODY MASS INDEX: 25.85 KG/M2 | WEIGHT: 170 LBS

## 2021-06-02 VITALS — WEIGHT: 170 LBS | BODY MASS INDEX: 25.85 KG/M2

## 2021-06-03 VITALS
SYSTOLIC BLOOD PRESSURE: 117 MMHG | BODY MASS INDEX: 24.16 KG/M2 | HEIGHT: 68 IN | TEMPERATURE: 97.5 F | RESPIRATION RATE: 20 BRPM | DIASTOLIC BLOOD PRESSURE: 76 MMHG | HEART RATE: 76 BPM | WEIGHT: 159.4 LBS

## 2021-06-04 ENCOUNTER — HOSPITAL ENCOUNTER (OUTPATIENT)
Dept: CT IMAGING | Facility: CLINIC | Age: 86
Discharge: HOME | End: 2021-06-04
Attending: INTERNAL MEDICINE

## 2021-06-04 VITALS — BODY MASS INDEX: 22.66 KG/M2 | WEIGHT: 153 LBS | HEIGHT: 69 IN

## 2021-06-04 VITALS — WEIGHT: 156 LBS | HEIGHT: 68 IN | BODY MASS INDEX: 23.64 KG/M2

## 2021-06-04 VITALS — BODY MASS INDEX: 23.64 KG/M2 | HEIGHT: 68 IN | WEIGHT: 156 LBS

## 2021-06-04 DIAGNOSIS — C83.30 LYMPHOMA, LARGE-CELL, DIFFUSE (H): ICD-10-CM

## 2021-06-05 NOTE — SEDATION DOCUMENTATION
Updated Dr. Marie of labs not being drawn prior to procedure, lab orders will be canceled and no new additional orders at this time.

## 2021-06-05 NOTE — H&P
H&P documented within 30 days (by Pema Carreon on 01/14/20).  I have reviewed the pertinent progress notes since the initial H&P. I have performed an assessment and examined the patient, as necessary, to update the patient's current status that may have changed.     Rene Marie M.D.   Vascular and Interventional Radiology   Pager: [phone]   After Hours / Scheduling: [phone]

## 2021-06-05 NOTE — BRIEF OP NOTE
Interventional Radiology Post-Procedure Note   [facility]    Patient name: Son Huizar  Pt MRN:[id number]   Date of procedure: 1/14/2020     Procedure: Venous Port Placement    Sedation method: Moderate Sedation    Pre Procedure Diagnosis: Lymphoma; Need for long term central venous access  Post Procedure Diagnosis: Same    Contrast: None    Medications:  Midazolam 0.5 mg IV  Fentanyl 50 mcg IV  Ancef 2 gm IV    Sedation time: 35 min    Estimated Blood Loss: Minimal    Complications: None    Findings/Plan:  1. Successful placement of right internal jugular power injectable port.   2. Catheter tip lies at the superior cavo-atrial junction.   3. Catheter is flushed with heparin and ready for immediate use.   ?   Please see dictation in PACS or under the Imaging tab in [employer] for detailed procedure note.     Rene Marie M.D.   Vascular and Interventional Radiology   Pager: [phone]  After Hours / Scheduling: [phone]     1/14/2020  10:30 AM

## 2021-06-05 NOTE — PROCEDURES
[facility]    Procedure: Port insertion  Date/Time: 1/14/2020 10:28 AM  Performed by: Rene Marie MD  Authorized by: Rene Marie MD       Universal Protocol    Site marked: Yes    Prior images obtained and reviewed: Yes    Required items: required blood products, implants, devices, and special equipment available    Patient identity confirmed: verbally with patient, arm band, provided demographic data and hospital-assigned identification number    Reevaluation: Patient was reevaluated immediately before administering moderate or deep sedation or anesthesia    Confirmation checklist: patient's identity using two indicators, relevant allergies, procedure was appropriate and matched the consent or emergent situation and correct equipment/implants were available    Time out: Immediately prior to procedure a time out was called to verify the correct patient, procedure, equipment, support staff and site/side marked as required    Universal Protocol: Joint Commission Universal Protocol was followed    Preparation: Patient was prepped and draped in the usual sterile fashion    ESBL (mL): 25    Anesthesia    Local anesthesia used?: Yes    Anesthesia: local infiltration    Local anesthetic: lidocaine 1% without epinephrine and lidocaine 1% with epinephrine    Anesthetic total (mL): 20    Sedation    Patient sedation: Yes    Sedation type: moderate (conscious) sedation    Sedation: fentanyl and midazolam    Vital signs: Vital signs monitored during sedation    Post-procedure    Description of procedure: Right IJV port inserted and ready to use.    Patient tolerance: Patient tolerated the procedure well with no immediate complications   Length of time physician present for 1:1 monitoring during sedation: 30

## 2021-06-05 NOTE — PRE-PROCEDURE
Procedure Name: port placement.  Date/Time: 1/14/2020 9:20 AM  Written consent obtained?: Yes  Risks and benefits: Risks, benefits and alternatives were discussed  Consent given by: patient  Expected level of sedation: moderate  ASA Class: Class 3- Severe systemic disease, definite functional limitations  Mallampati: Grade 2- soft palate, base of uvula, tonsillar pillars, and portion of posterior pharyngeal wall visible  Patient states understanding of procedure being performed: Yes  Patient's understanding of procedure matches consent: Yes  Procedure consent matches procedure scheduled: Yes  Appropriately NPO: yes  Lungs: lungs clear with good breath sounds bilaterally  Heart: normal heart sounds and rate  History & Physical reviewed: History and physical reviewed and no updates needed  Statement of review: I have reviewed the lab findings, diagnostic data, medications, and the plan for sedation

## 2021-06-08 NOTE — PROGRESS NOTES
INR at 3.7. Will adjust dose of Warfarin with CMA giving instruction. 1.25 mg m,w,f and 2.5 mg all other days. With retest 1-27. After talking with pt and discussing history of greens/salads and medication change. Pt will  continue  with current diet and dosing of Warfarin.  Continue with moderation of Vit K and green leafy vegetables. Cautioned to call with increase bruising or bleeding. Reminded to call with medication change especially antibiotic. Call with any questions or concerns or any up coming procedures. Cautioned about using Herbal medication.

## 2021-06-08 NOTE — PROGRESS NOTES
Pt on antibiotic for cold. INR high at 4.8. Will hold Warfarin for fri and sat and 1.25 mg sun and mon. 2.5 mg tues and wed and retest thur. He will increase salads and continue antibiotic. After talking with pt and discussing history of greens/salads and medication change. Pt will  continue  with current diet and dosing of Warfarin.  Continue with moderation of Vit K and green leafy vegetables. Cautioned to call with increase bruising or bleeding. Reminded to call with medication change especially antibiotic. Call with any questions or concerns or any up coming procedures. Cautioned about using Herbal medication.

## 2021-06-08 NOTE — PROGRESS NOTES
Pre-Procedure Unconfirmed COVID Test     Son BUNDY [facility]    COVID Screening  Due to the inability to confirm the patient's COVID status (positive or negative), the patient was screened for COVID symptoms     Patient reports the following:  Fever? No   Cough? No   Shortness of breath? No   Skin rash? No    If the patient is positive for new symptoms or worsening symptoms, and the procedure is deemed necessary by the ordering provider, notify your manager/supervisor     Patient Information  Patient informed of the no visitor policy  Patient instructed to continue to self-quarantine prior to procedure  Patient informed to contact the ordering provider if the following symptoms develop prior to procedure:   Fever  Cough  Shortness of Breath  Sore throat   Runny or stuffy nose  Muscle or body aches  Headaches  Fatigue  Vomiting or diarrhea   Rash    Hans Garber

## 2021-06-08 NOTE — PROGRESS NOTES
INR 2.8 still on antibiotic. Continue current management dosing of Warfarin. Continue  diet of moderate Vitamin K intake. Discussed with pt the need to call with questions or concerns or any change in medication especially herbal medication or OTC. Call with increased bleeding or bruising or any upcoming procedures.

## 2021-06-08 NOTE — PROGRESS NOTES
INR 4.5 pt ill will cough and on antibiotic. Will hold today's Warfarin and decrease dose to 2.5 mg Sat,Sun, mon and then 5 mg tue and thur with wed at 1.25 mg. Retest in one week. Pt will increase salads. After talking with pt and discussing history of greens/salads and medication change. Pt will  continue  with current diet and dosing of Warfarin.  Continue with moderation of Vit K and green leafy vegetables. Cautioned to call with increase bruising or bleeding. Reminded to call with medication change especially antibiotic. Call with any questions or concerns or any up coming procedures. Cautioned about using Herbal medication.

## 2021-06-09 NOTE — PROGRESS NOTES
[facility] HEART [facility]  Arrhythmia Clinic  Sandeep BURLESON Roberto    Referring:      Assessment:         Permanent atrial fibrillation: The patient is now approximately 1 year since the initial finding of a left atrial appendage thrombus.  The patient is not been on dual medical therapy including warfarin and Plavix and had 2 subsequent MELONY examinations which demonstrated ongoing presence of thrombus.  The patient is met with Dr. Nasim Anaya regarding possible surgical ligation of his left atrial appendage to treat this problem.  Subsequently the patient has been down to the [facility] for a second opinion with both cardiology and cardiothoracic surgery.  The patient would like to move ahead with next steps.  Repeat imaging has been suggested and I think it most likely a repeat MELONY with possible inclusion of Definity contrast would be the optimal imaging modality.  If there is no evidence of ongoing thrombus within the appendage then the patient would be a candidate for transvenous left atrial appendage occlusion with the WATCHMAN device.  If the study demonstrates ongoing presence of the left atrial appendage thrombus then I would recommend that the patient move ahead with surgical ligation of the left atrial appendage.  As outlined in Dr. Anaya's note he would require coronary angiography prior to his surgery to permit revascularization if appropriate.    Aortic insufficiency: Mild      Recommendations:    Continue on current medical therapy with target INR 2.5-3.5.    We will schedule MELONY in the next week.  I will discuss with the noninvasive cardiologist whether or not Definity contrast would provide any additional diagnostic information.    Once the MELONY results are known then we can make a plan is to next steps regarding therapeutic intervention.      Patient Active Problem List   Diagnosis     Syncope     Aortic valve insufficiency     Chronic atrial fibrillation     Alteration in  "anticoagulation     Macular degeneration-right     Left atrial thrombus       Subjective:  Son Huizar (81 y.o. male) returns to the arrhythmia clinic for discussion of treatment options for his left atrial appendage thrombus.  The patient initially was diagnosed about a year ago and has been on combined warfarin and Plavix therapy since that time.  2 subsequent follow-up MELONY is demonstrated ongoing presence of the left atrial appendage thrombus.  The patient is met with Dr. Nasim Anaya regarding surgical options for treatment of this problem.  The patient has no new symptoms.  The patient has been down to the [facility] for second opinion from cardiology and current cardiothoracic surgery.  Ultimately their recommendations were essentially the same as our recommendations.  The patient returns now for discussion of moving forward with those plans.  Other than a bad bout of \"the flu\" in December the patient has felt reasonably well.  He notes no exertional dyspnea or chest pain.  He is not experiencing any orthopnea PND or ankle edema.  He continues to have some balance issues post stroke.  He has been compliant with his medical therapy.    Current Outpatient Prescriptions   Medication Sig Dispense Refill     acetylcysteine (NAC) 600 mg cap capsule Take 600 mg by mouth daily.       alpha lipoic acid 100 mg cap Take 1 capsule by mouth daily.       ANTIOX#10/OM3/DHA/EPA/LUT/ZEAX (I-CAPS ORAL) Take 1 capsule by mouth daily.       ascorbic acid (VITAMIN C) 250 MG tablet Take 500 mg by mouth 3 (three) times a week.        calcium carbonate (OS-MALATHI) 600 mg (1,500 mg) tablet Take 420 mg by mouth daily.        cetirizine (ZYRTEC) 10 MG tablet Take 10 mg by mouth as needed for allergies.       cholecalciferol, vitamin D3, (VITAMIN D3) 2,000 unit cap Take 1 capsule by mouth daily.       clopidogrel (PLAVIX) 75 mg tablet Take 1 tablet (75 mg total) by mouth daily. 30 tablet 6     coenzyme Q10 (CO Q-10) 100 mg capsule " "Take 100 mg by mouth daily.       docoshexanoic acid-eicosapent 500 mg (FISH OIL) 500-100 mg cap capsule Take 1,000 mg by mouth 2 (two) times a day.        fluticasone (FLONASE) 50 mcg/actuation nasal spray 1 spray into each nostril daily as needed for rhinitis.        grape seed extract 50 mg cap Take 1 capsule by mouth every morning.       LACTOBACILLUS ACIDOPHILUS (PROBIOTIC ORAL) Take 1 capsule by mouth daily.       levOCARNitine (L-CARNITINE) 500 mg Tab Take 1 tablet by mouth daily.       lutein 20 mg Tab Take 1 tablet by mouth daily.       magnesium 200 mg Tab Take 0.5 tablets by mouth daily.        metoprolol tartrate (LOPRESSOR) 25 MG tablet Take 25 mg by mouth 2 (two) times a day.       multivitamin with minerals tablet Take 1 tablet by mouth daily.  0     RED YEAST RICE ORAL Take 600 mg by mouth daily.        SAW PALMETTO ORAL Take by mouth daily.       warfarin (COUMADIN) 2.5 MG tablet See Admin Instructions. 2.5 mg every day except Tuesday 5mg       hawthorn 150 mg cap Take 1 capsule by mouth daily.        No current facility-administered medications for this visit.        Review of Systems:   General: WNL  Eyes: WNL  Ears/Nose/Throat: WNL  Lungs: WNL  Heart: WNL  Stomach: WNL  Bladder: WNL  Muscle/Joints: WNL  Skin: WNL  Nervous System: WNL  Mental Health: WNL     Blood: WNL    Family History  Family History   Problem Relation Age of Onset     Cancer Father      No Medical Problems Mother      Cancer Sister      No Medical Problems Brother        Social History   reports that he has never smoked. He has never used smokeless tobacco. He reports that he does not drink alcohol or use illicit drugs.    Objective:   Vital signs in last 24 hours:  Visit Vitals     /72 (Patient Site: Left Arm, Patient Position: Sitting, Cuff Size: Adult Regular)     Pulse 76     Resp 16     Ht 5' 9.5\" (1.765 m)     Wt 163 lb (73.9 kg)     BMI 23.73 kg/m2     Weight: Weight: 163 lb (73.9 kg)     Physical Exam:  General: " The patient is alert oriented to person place and situation.  The patient is in no acute distress at the time of my evaluation.  Eyes: Pupils are equal, round, and reactive to light.  Conjunctiva and sclera are clear.  ENT: Oral mucosa is moist and without redness. No evident nasal discharge.  Pulmonary: Lungs are clear bilaterally with no rales, rhonchi, or wheezes.    Cardiovascular exam: Rhythm is irregular. S1 and S2 are normal. No significant murmur is present. JVP is normal. Lower extremities demonstrate no significant edema. Distal pulses are intact bilaterally.  Abdomen is flat, soft, and nontender.  Musculoskeletal: Spine is straight. Extremities without deformity.  Neuro: Gait is symmetric with the right leg limp.     Skin is warm, dry, and otherwise intact.      Cardiographics:       Lab Results:   Lab Results   Component Value Date     05/31/2016    K 4.7 05/31/2016     05/31/2016    CO2 26 05/31/2016    BUN 22 05/31/2016    CREATININE 0.97 05/31/2016    CALCIUM 9.4 05/31/2016     Lab Results   Component Value Date    WBC 8.2 03/17/2016    WBC 13.9 (H) 09/10/2015    HGB 16.3 03/17/2016    HCT 49.0 03/17/2016    MCV 89 03/17/2016     03/17/2016     Lab Results   Component Value Date    INR 2.1 (!) 03/03/2017    INR 2.46 (H) 09/06/2016    INR 4.00 (!) 06/20/2016         Outside record review:

## 2021-06-09 NOTE — PRE-PROCEDURE
Procedure Name: right port removal with IV fentanyl  Date/Time: 6/19/2020 10:01 AM  Written consent obtained?: Yes  Risks and benefits: Risks, benefits and alternatives were discussed  Consent given by: patient  : IV fentanyl.  ASA Class: Class 3- Severe systemic disease, definite functional limitations  Mallampati: Grade 2- soft palate, base of uvula, tonsillar pillars, and portion of posterior pharyngeal wall visible  Patient states understanding of procedure being performed: Yes  Patient's understanding of procedure matches consent: Yes  Procedure consent matches procedure scheduled: Yes  Appropriately NPO: yes  Lungs: lungs clear with good breath sounds bilaterally  Heart: normal heart sounds and rate  History & Physical reviewed: History and physical reviewed and no updates needed  Statement of review: I have reviewed the lab findings, diagnostic data, medications, and the plan for sedation

## 2021-06-09 NOTE — PROGRESS NOTES
INR low at 1.7. Will increase Warfarin and retest in one week. No change in diet or medications. After talking with pt and discussing history of greens/salads and medication change. Pt will  continue  with current diet and dosing of Warfarin.  Continue with moderation of Vit K and green leafy vegetables. Cautioned to call with increase bruising or bleeding. Reminded to call with medication change especially antibiotic. Call with any questions or concerns or any up coming procedures. Cautioned about using Herbal medication.

## 2021-06-09 NOTE — PROCEDURES
[facility]    Procedure: IR Port Placement    Date/Time: 6/19/2020 10:49 AM  Performed by: Rene Marie MD  Authorized by: Rene Marie MD       Universal Protocol    Site marked: NA    Prior images obtained and reviewed: Yes    Required items: required blood products, implants, devices, and special equipment available    Patient identity confirmed: verbally with patient, arm band, provided demographic data and hospital-assigned identification number    Reevaluation: NA - No sedation, light sedation, or local anesthesia    Confirmation checklist: patient's identity using two indicators, relevant allergies, procedure was appropriate and matched the consent or emergent situation and correct equipment/implants were available    Time out: Immediately prior to procedure a time out was called to verify the correct patient, procedure, equipment, support staff and site/side marked as required    Universal Protocol: Joint Commission Universal Protocol was followed    Preparation: Patient was prepped and draped in the usual sterile fashion    ESBL (mL): 25    Anesthesia    Local anesthesia used?: Yes    Anesthesia: see MAR for details    Local anesthetic: lidocaine 1% with epinephrine and lidocaine 1% without epinephrine    Anesthetic total (mL): 18    Sedation  Patient sedation: No    Post-procedure    Description of procedure: Successful removal of right chest port    Patient tolerance: Patient tolerated the procedure well with no immediate complications   Length of time physician present for 1:1 monitoring during sedation: 0 (NA)

## 2021-06-10 NOTE — TELEPHONE ENCOUNTER
Phone call from patients daughter wondering if there was a reason for patient to be taking Eliquis and Plavix?    Reviewed chart, pt was last seen in 5/2018 by Patrick Roland.  It was explained that due to LIZ thrombus he should continue both Plavix and Eliquis.  If he would like to discuss his medications further he would need to make an apt.  Reviewed this with the patients daughter and she states understanding.

## 2021-06-10 NOTE — PROGRESS NOTES
"Cardiac surgery    Mr. Huizar is an 82 year-old man with paroxysmal atrial fibrillation and a left previously identified let atrial appendage thrombus. He was undergoing evaluation for placement of the Watchman device when this LA appendage thrombus was identified. 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    Now he returns to clinic to discuss the possibility of left atrial appendage thrombectomy and ligation on cardiopulmonary bypass since the LIZ thrombus has not completely resolved with anticoagulation.    He complains of more difficulty with speech recently and difficulty wit balance.    MELONY 3/28/17- the LIZ thrombus is less well defined than it was previously but there is \"smoke\" or spontaneous echo contrast in the LIZ with some congealing of the contrast suggestive of early thrombus formation.     MRI 4/21/17  1. No acute intracranial finding. No evidence for recent infarct, hemorrhage or mass.  2. Sequela of known remote infarct of the right thalamus/posterior limb of the right internal capsule, identified as acute on 03/02/2014 MRI head.  3. Moderate to advanced age-related changes, progressed since 03/02/2014, with disproportionate parenchymal volume loss in the region of the temporal lobes. Findings are nonspecific but can be seen in dementia.    Plan:    I am having him see a neurologist to evaluate his new symptoms. I am concerned that he has been having embolic events but the MRI does not support this. If his symptoms are not due to stroke(s), then the obvious question is what is their etiology? Surgery for LIZ thrombectomy and ligation will not improve this. The only reason to operate is to decrease his risk of thromboembolic stroke and allow him to stop Coumadin. I have referred the patient to see Dr. Erickson from [facility] (he saw him in 2014 after his stroke). The appointment is on June 13, 2017. I will see the patient after that in clinic to discuss this further. I will discuss the case " further with Dr. Mejia.    According to the daughter, the patient believes that having surgery will improve his memory. This is definitely not the case. I will emphasize to him again that surgery is purely a preventative measure and the goal would be to allow him to stop coumadin. He believes that coumadin is causing a loss of taste, in addition to bruising of his skin. I am not sure that his taste will improve with stopping coumadin.    Nasim Anaya MD PhD

## 2021-06-12 NOTE — PROGRESS NOTES
Cardiac surgery    I had another long discussion with Mr. Huizar. He is doing quite well overall. He has had no problems with his coumadin. I do not recommend open heart surgery for removal of the left atrial appendage thrombus. He agrees with this recommendation.    Nasim Anaya MD PhD

## 2021-06-13 NOTE — PROGRESS NOTES
[facility] HEART [facility]  Arrhythmia Clinic  Sandeep Mejia    Referring:      Assessment:         Permanent atrial fibrillation: The patient has no symptoms related to his atrial fibrillation.  He remains on dual oral anticoagulant (warfarin + Plavix) due to the presence of a persistent left atrial appendage thrombus.  This is been seen repeatedly on MELONY over the past 24 months.  He was evaluated by Dr. Anaya from the CV surgical service regarding possible open left atrial appendage ligation.  It is been recommended that the patient remain on oral anticoagulant therapy.  I did discuss with Iain and his daughter the possibility of changing over to a alternative oral anticoagulant such as Eliquis.  This would provide him a somewhat better safety margin in regards to being on chronic anticoagulant therapy.  I also said it would be reasonable to consider a repeat MELONY in March 2018 to see if there is been any resolution.    Syncope: No recurrent lightheadedness, presyncope or syncope.        Recommendations:    Continue with current medical therapy for now.  I will ask the Adis Crocker Pharm.D. to assess the out-of-pocket cost for switching to Eliquis prior to changing his prescription.    24-hour Holter monitor to assess his heart rate response to activities of daily living.    Follow-up in the A. fib clinic with the EP nurse practitioner in 6 months.      Patient Active Problem List   Diagnosis     Syncope     Aortic valve insufficiency     Chronic atrial fibrillation     Alteration in anticoagulation     Macular degeneration-right     Left atrial appendage thrombus       Subjective:  Son Huizar (82 y.o. male) returns to the arrhythmia clinic for interval reevaluation of his permanent atrial fibrillation with history of known left atrial appendage thrombus.  The patient is not had any acute new changes in his neurologic status.  He does have chronic complaints of balance and mobility issues related  to his previous stroke.  The patient once again reviewed the current status of his test results as a pertains to the left atrial appendage thrombus and we extensively went through options once again.  He reports no new cardiac symptoms of exertional dyspnea, chest pain, orthopnea, PND, or ankle edema.    Current Outpatient Prescriptions   Medication Sig Dispense Refill     alpha lipoic acid 100 mg cap Take 1 capsule by mouth daily.       ANTIOX#10/OM3/DHA/EPA/LUT/ZEAX (I-CAPS ORAL) Take 1 capsule by mouth 2 (two) times a day.        ascorbic acid (VITAMIN C) 250 MG tablet Take 500 mg by mouth 3 (three) times a week.        calcium carbonate (OS-MALATHI) 600 mg (1,500 mg) tablet Take 420 mg by mouth daily.        cetirizine (ZYRTEC) 10 MG tablet Take 10 mg by mouth as needed for allergies.       cholecalciferol, vitamin D3, (VITAMIN D3) 2,000 unit cap Take 1 capsule by mouth daily.       clopidogrel (PLAVIX) 75 mg tablet Take 1 tablet (75 mg total) by mouth daily. 90 tablet 1     coenzyme Q10 (CO Q-10) 100 mg capsule Take 100 mg by mouth daily.       docoshexanoic acid-eicosapent 500 mg (FISH OIL) 500-100 mg cap capsule Take 1,000 mg by mouth 2 (two) times a day.        fluticasone (FLONASE) 50 mcg/actuation nasal spray 1 spray into each nostril daily as needed for rhinitis.        grape seed extract 50 mg cap Take 1 capsule by mouth every morning.       LACTOBACILLUS ACIDOPHILUS (PROBIOTIC ORAL) Take 1 capsule by mouth daily.       levOCARNitine (L-CARNITINE) 500 mg Tab Take 1 tablet by mouth daily.       lutein 20 mg Tab Take 1 tablet by mouth daily.       magnesium 200 mg Tab Take 0.5 tablets by mouth daily.        metoprolol tartrate (LOPRESSOR) 25 MG tablet Take 1/2 tab in am and 1/2 tab in pm       multivitamin with minerals tablet Take 1 tablet by mouth daily.  0     RED YEAST RICE ORAL Take 600 mg by mouth daily.        SAW PALMETTO ORAL Take by mouth daily.       warfarin (COUMADIN) 5 MG tablet Take 5 mg by  "mouth daily. Take 5mg every evening       acetylcysteine (NAC) 600 mg cap capsule Take 600 mg by mouth daily.       hawthorn 150 mg cap Take 1 capsule by mouth daily.        No current facility-administered medications for this visit.        Review of Systems:   General: WNL  Eyes: WNL  Ears/Nose/Throat: WNL  Lungs: WNL  Heart: WNL  Stomach: WNL  Bladder: WNL  Muscle/Joints: WNL  Skin: WNL  Nervous System: WNL  Mental Health: WNL     Blood: WNL    Family History  Family History   Problem Relation Age of Onset     Cancer Father      No Medical Problems Mother      Cancer Sister      No Medical Problems Brother        Social History   reports that he has never smoked. He has never used smokeless tobacco. He reports that he does not drink alcohol or use illicit drugs.    Objective:   Vital signs in last 24 hours:  /88 (Patient Site: Left Arm, Patient Position: Sitting, Cuff Size: Adult Regular)  Pulse 76 Comment: Irregular  Resp 16  Ht 5' 9\" (1.753 m)  Wt 171 lb 14.4 oz (78 kg)  BMI 25.39 kg/m2  Weight: Weight: 171 lb 14.4 oz (78 kg)     Physical Exam:  General: The patient is alert oriented to person place and situation.  The patient is in no acute distress at the time of my evaluation.  Eyes: Pupils are equal, round, and reactive to light.  Conjunctiva and sclera are clear.  ENT: Oral mucosa is moist and without redness. No evident nasal discharge.  Pulmonary: Lungs are clear bilaterally with no rales, rhonchi, or wheezes.    Cardiovascular exam: Rhythm is irregular. S1 and S2 are normal. No significant murmur is present. JVP is normal. Lower extremities demonstrate no significant edema. Distal pulses are intact bilaterally.  Abdomen is flat, soft, and nontender.  Musculoskeletal: Spine is straight. Extremities without deformity.  Neuro: Gait is normal.   Skin is warm, dry, and otherwise intact.      Cardiographics:       Lab Results:   Lab Results   Component Value Date     06/15/2017    K 4.3 " 06/15/2017     06/15/2017    CO2 29 06/15/2017    BUN 23 06/15/2017    CREATININE 1.35 (H) 06/15/2017    CALCIUM 9.6 06/15/2017     Lab Results   Component Value Date    WBC 8.2 03/17/2016    WBC 13.9 (H) 09/10/2015    HGB 16.3 03/17/2016    HCT 49.0 03/17/2016    MCV 89 03/17/2016     03/17/2016     Lab Results   Component Value Date    INR 2.1 (!) 04/28/2017    INR 1.95 (H) 03/28/2017    INR 4.00 (!) 06/20/2016         Outside record review:

## 2021-06-15 PROBLEM — I51.89 LEFT ATRIAL MASS: Status: ACTIVE | Noted: 2017-04-27

## 2021-06-16 PROBLEM — Z71.89 ADVANCED CARE PLANNING/COUNSELING DISCUSSION: Status: ACTIVE | Noted: 2018-09-25

## 2021-06-16 PROBLEM — E78.5 HYPERLIPIDEMIA LDL GOAL <70: Status: ACTIVE | Noted: 2018-09-15

## 2021-06-16 PROBLEM — J94.2 HEMOTHORAX: Status: ACTIVE | Noted: 2019-09-04

## 2021-06-17 NOTE — PROGRESS NOTES
[place] AF study Physical Exam  If findings abnormal, please explain  Physical Exam:        Normal  Abnormal Not Done  General Appearance [x]      []    []   HEENT   [x]      []    []   Chest    [x]      []    []    Heart    []      [x]    []   irregular  Abdomen   [x]      []    []   Musculoskeletal  []      [x]    []   Limited ROM bilateral shoulders r/t rotater cuff surgery   Neurologic   [x]      []    []     Dermatologic  [x]      []    []             Absent  Present  Tremor   [x]      []    If present, score 0-10      0 is no tremor, 1 to 3 as mild, tremor, 4 to 6 as moderate      tremor, 7 to 9 as severe tremor, and 10 as extremely      severe tremor   Tattoos on sensor locations? [] Yes    [x]  No      (i.e., wrists)    Niya Julian, CNP

## 2021-06-17 NOTE — PROGRESS NOTES
"      [place] AF Study Consent Visit    Study description: Electrocardiogram Clinical Validation Study \"[place] AF\" study    Note time seated: 12:58    Son Huizar a 83 y.o. male , was seen in 2650 [location] today to discuss participation in the [place] AF study. The consent discussion began on 13:00  The consent form was reviewed with the patient and Research Coordinator.     The consent discussion included:    Study purpose     Conflict of interest    Device description      Study visits    Risks of participation    Benefits (if any)    Alternatives    Voluntary participation    Confidentiality     Compensation/costs of participation    Study stipends    Injury and legal rights    The subject was provided time to review the consent form and consider participation. his questions were answered to his satisfaction.   The patient has voluntarily agreed to participate in the above noted study.   The consent form version and HIPPA form version 4.0_16 March 2018, IRB approved April 19, 2018  was signed 04/23/18.    The subject was provided with a copy of the consent form and HIPPA. A copy of the signed forms was forwarded to medical records.    No study procedures were done prior to Son Huizar providing informed consent.     Tiffany Samuel RN      Study Data collections   Vitals  (TPBP)     Vitals:    04/23/18 1256   BP: 131/89   Patient Site: Right Arm   Patient Position: Sitting   Cuff Size: Adult Regular   Pulse: 70   Temp: 97.2  F (36.2  C)   Weight: 175 lb (79.4 kg)   Height: 5' 9.25\" (1.759 m)      VS taken after 5 min rest   Temp in  C  Height/weight (cm/kg)   Note  time patient placed in supine position: 13:08    Ethnicity   []   or    [x]  Not  or   Race  []   or   []    []  Black, of  heritage or   []   or Other   [x]  White  OCCUPATION: Retired Construction " Equipment  sales  HISTORY OF HEART RHYTHM ABNORMALITIES   []  None   [x]  Atrial Flutter   [] 2  AVB -Type I  [x] AF (permanent)  []  Ventricular Tachycardia  []  2  AVB -other:   []  AF (persistent   []  Atrial Tachycardia  [] 3  AVB   []  AF (paroxysmal  [] 1  AVB     []  LBBB  [] AF (other)   [] 2  AVB -Type I    []  RBBB  MEDICAL AND ALLERGY HISTORY ([facility]HA)-with start & stop dates   Special interest allergies: active allergic skin reactions     Past Medical History:   Diagnosis Date     Aortic valve insufficiency 7/11/2015    Mild-moderate AI echo July 2015      Arthritis 2010    hands     Atrial flutter 07/10/2015     Coronary artery disease 2014     Essential hypertension 2014     Hematuria 07/sep/2007    unknown per pt     Hyperlipidemia 4/13/2018     Left atrial thrombus 5/3/2016    MELONY 3/9/2016      Macular degeneration 2015     Persistent atrial fibrillation 7/24/2015     Stroke 03/04/2014     Thrombus of right atrial appendage     per H&P        Allergies   Allergen Reactions     Pollen Itching     Runny nose, sneezing, itchy eyes      Dust [Other Environmental Allergy] Itching     Running nose. Itchy eye     Ragweed Itching     Running nose, itchy eyes     Yeast, Dried Headache     brewrys yeast only        Current Outpatient Prescriptions:      acetylcysteine (NAC) 600 mg cap capsule, Take 600 mg by mouth daily., Disp: , Rfl: Started: Unknown- Supplement     alpha lipoic acid 100 mg cap, Take 1 capsule by mouth daily., Disp: , Rfl: Started 2005- Supplement     ANTIOX#10/OM3/DHA/EPA/LUT/ZEAX (I-CAPS ORAL), Take 1 capsule by mouth 2 (two) times a day. , Disp: , Rfl: Started 2013-Supplement     apixaban (ELIQUIS) 5 mg Tab tablet, Take 1 tablet (5 mg total) by mouth 2 (two) times a day., Disp: 60 tablet, Rfl: 10/23/2017- Atrial Fib     ascorbic acid (VITAMIN C) 250 MG tablet, Take 500 mg by mouth 3 (three) times a week. , Disp: , Rfl: Started 2012 Supplement     calcium carbonate (OS-MALATHI) 600 mg (1,500 mg)  tablet, Take 420 mg by mouth daily. , Disp: , Rfl: started 2012-Supplement     cetirizine (ZYRTEC) 10 MG tablet, Take 10 mg by mouth as needed for allergies., Disp: , Rfl: Started: as needed 2015- Seasonal Allergies     cholecalciferol, vitamin D3, (VITAMIN D3) 2,000 unit cap, Take 1 capsule by mouth daily., Disp: , Rfl: Started 2012-Supplement     clopidogrel (PLAVIX) 75 mg tablet, Take 1 tablet (75 mg total) by mouth daily., Disp: 90 tablet, Rfl: 0 Started 03/29/2016- Atrial Fib     coenzyme Q10 (CO Q-10) 100 mg capsule, Take 100 mg by mouth daily., Disp: , Rfl: Started 2007-Supplement     docoshexanoic acid-eicosapent 500 mg (FISH OIL) 500-100 mg cap capsule, Take 1,000 mg by mouth 2 (two) times a day. , Disp: , Rfl: -Started 2018-Hyperlipidemia     fluticasone (FLONASE) 50 mcg/actuation nasal spray, 1 spray into each nostril daily as needed for rhinitis. , Disp: , Rfl: Started 2015- Seasonal Allergies     grape seed extract 50 mg cap, Take 1 capsule by mouth every morning., Disp: , Rfl: Started 2014-Supplement     hawthorn 150 mg cap, Take 1 capsule by mouth daily. , Disp: , Rfl: Not taking-Supplement     LACTOBACILLUS ACIDOPHILUS (PROBIOTIC ORAL), Take 1 capsule by mouth daily., Disp: , Rfl: Started 2014-Supplement     levOCARNitine (L-CARNITINE) 500 mg Tab, Take 1 tablet by mouth daily., Disp: , Rfl: -Supplement-2015     lutein 20 mg Tab, Take 1 tablet by mouth daily., Disp: , Rfl: Started 2015-Supplement     magnesium 200 mg Tab, Take 0.5 tablets by mouth daily. , Disp: , Rfl: Started 2015-Supplement     metoprolol tartrate (LOPRESSOR) 25 MG tablet, Take 1/2 tab in am and 1/2 tab in pm, Disp: , Rfl: Started 03/04/2014- Hypertension     multivitamin with minerals tablet, Take 1 tablet by mouth daily., Disp: , Rfl: 0-Started 2007-Supplement     RED YEAST RICE ORAL, Take 600 mg by mouth daily. , Disp: , Rfl: Started 2014-Supplement     SAW PALMETTO ORAL, Take by mouth daily., Disp: , Rfl: Started  2014-Supplement    ECG done Tiffany  reviewed by & PE done by Shai Montes  Tobacco/nicotine  [x]  Never  [] Rarely  []  Occasionally  []  Frequently  [] Daily   Alcohol   [x]  Never  [] Rarely  []  Occasionally  []  Frequently  [] Daily    [] Daily-Heavy option  Recreational drug use  [x]  Never  [] Rarely  []  Occasionally  []  Frequently  [] Daily   Caffeine  []  Never  [] Rarely  []  Occasionally  []  Frequently  [x] Daily    [] Daily-Heavy option  Exercise  []  Never  [] Rarely  []  Occasionally  []  Frequently  [x] Daily       Dominant hand [] left  [x] right [] ambidextrous  Preferred Wrist to wear band on   [x]  left   []  right []  neither [] both  Were screening Day & study day: []  same [] different   Same: wrist circumference:      185.00  mm   Device wearing wrist skin fold thickness:   5.5  mm  Device wearing wrist hairiness:     []  Fine, low density [x]  Thick, low density   []  Fine, high density []  Thick, high density  Pregnancy test  for WOCBP    [x] n/a male or female not child bearing potential  Device Set up and Data collection  CS Laptop ID  [id number]  CS Belt ID  [id number]  Wrist device laptop ID [id number]  Wrist device   Wrist device size []  small [x]  large  Snug band notch 10  Wrist device worn on []  right [x]  left  Device location: [] dorsal and distal  [x]  Dorsal and proximal     []  Palmar and distal []  Palmar and proximal  Wrist device orientation []  proximal  [x] distal  IN-STUDY NOTES  04/23/18  Time: 13:50  Wrist device interaction   [x]  finger []  finger & thumb  []  other:   # of practice runs (no more than 6) 2   Ease of use   DCD rating     [] 1 (Unable to use)    []  2 (Below average ease of use)    []  3 (Average Ease of use)    []: 4 (above average ease of use)    [x]  5 (easiest to use)  [employer] Touch rating    []  1 (Unable to use)    []  2 (Below average ease of use)    []  3 (Average Ease of  use)    [] 4 (above average ease of use)    [x]  5 (easiest to use)  [employer]  [employer] Rating    []  1 (Unable to use)    []  2 (Below average ease of use)    []  3 (Average Ease of  use)    [] 4 (above average ease of use)    [x]  5 (easiest to use)  [place] study updates/corrections  Subject experienced no adverse events during his participation in the [place] study.    Subject has now completed their participation in the [place] AF study.  Tiffany Samuel, RN

## 2021-06-17 NOTE — PROGRESS NOTES
[facility] HEART [facility]   Arrhythmia Clinic    Assessment/Plan:  Diagnoses and all orders for this visit:    Chronic atrial fibrillation and is previously demonstrated thrombus in left atrial appendage with structural MELONY on warfarin.  He is now on Eliquis and states he is taking it consistently.  He is also on Plavix daily.  He has had cardiac surgery consult for removal of thrombus and removal of left atrial appendage at [facility] and also [facility] and has decided not to do cardiac surgery.  Dr. Mejia recommended on last visit with him in October that he could have another structural MELONY in March of this year to reevaluate for thrombus and if none found could move ahead with watchman placement.  Iain is accompanied by his daughter today in clinic.  She is not in favor of pursuing any more TEEs but Iain tells me that he would like to proceed as he would love to be off of anticoagulant.  He is doing physical therapy for balance and wants structural MELONY  scheduled after May 4 per his request as he will be done with therapy then.  To follow-up with Dr. Mejia in 6 months if he does not move ahead with procedure.  I explained details of MELONY which he is very familiar with.  Orders placed today for MELONY  I discussed the [place] study today and details of it.  After discussion he is willing to participate in the study.  Next step will be a call from research staff to schedule .  -     Echo MELONY Structural Guidance; Future; Expected date: 4/13/18    Essential hypertension and well-controlled.    Other orders  -     Echo Transesophageal; Standing  -     Verify informed consent; Standing  -     Vital signs; Standing  -     Pulse Oximetry; Standing  -     Diet NPO for 6 hours pre-procedure; Standing  -     Remove any dentures or partial plates prior to procedure; Standing  -     Give scheduled medications with a sip of water morning of the procedure; Standing  -     Oxygen nasal cannula; 2-6 liters; Standing  -     sodium  chloride 0.9%; Infuse 25 mL/hr into a venous catheter continuous.    ______________________________________________________________________    Subjective:    I had the opportunity to see Son Huizar at the [facility]. Son Huizar is a 83 y.o. male and here for follow-up regarding chronic atrial fibrillation .  Son Huizar has a known history of chronic atrial fibrillation and known left atrial appendage thrombus when evaluated for possible watchman in the past.  He has been switched from warfarin to Eliquis and also added on Plavix.  It has been over 6 months since last MELONY.  His history is significant for previous stroke with left-sided numbness and poor balance.  He is currently in therapy for improvement of balance.  He is quite active for his age and previous stroke.  He tells me he climbs 5 flights of stairs to go to physical therapy and has only slight shortness of breath with this level of activity.  On going to exam table it is clear that he has poor balance and is at high risk for falling.  He has a very positive attitude.  Other than poor balance he denies any problems.  He has not had any previous clotting problems and no known disorder.  This visit will serve as history and physical for MELONY if timing appropriate.  See problem list for more details.  Medical, past medical, surgical and social history reviewed and updated.  Meds and allergies reviewed.       ______________________________________________________________________    Problem List:  Patient Active Problem List   Diagnosis     Syncope     Aortic valve insufficiency     Chronic atrial fibrillation     Alteration in anticoagulation     Macular degeneration-right     Left atrial appendage thrombus     Medical History:  Past Medical History:   Diagnosis Date     Aortic valve insufficiency 7/11/2015    Mild-moderate AI echo July 2015      Arthritis     hands     Coronary artery disease      Essential hypertension 4/13/2018      Hematuria      Hyperlipidemia 4/13/2018     Left atrial thrombus 5/3/2016    MELONY 3/9/2016      Macular degeneration      Persistent atrial fibrillation 7/24/2015     Stroke      Thrombus of right atrial appendage     per H&P     Surgical History:  Past Surgical History:   Procedure Laterality Date     CATARACT EXTRACTION Bilateral      EYE SURGERY      cataract surgery     HERNIA REPAIR  2015     HERNIA REPAIR       KNEE SURGERY Left      NEUROPLASTY / TRANSPOSITION MEDIAN NERVE AT CARPAL TUNNEL BILATERAL       ROTATOR CUFF REPAIR Left     one     ROTATOR CUFF REPAIR Right     two times     SHOULDER SURGERY       Social History:  Social History   Substance Use Topics     Smoking status: Never Smoker     Smokeless tobacco: Never Used     Alcohol use No        Review of Systems: Review of Systems:   General: WNL  Eyes: WNL  Ears/Nose/Throat: WNL  Lungs: WNL  Heart: WNL  Stomach: WNL  Bladder: WNL  Muscle/Joints: WNL  Skin: WNL  Nervous System: WNL  Mental Health: WNL     Blood: WNL      Family History:  Family History   Problem Relation Age of Onset     Cancer Father      No Medical Problems Mother      Cancer Sister      No Medical Problems Brother          Allergies:  Allergies   Allergen Reactions     Pollen Itching     Runny nose, sneezing, itchy eyes      Dust [Other Environmental Allergy] Itching     Running nose. Itchy eye     Ragweed Itching     Running nose, itchy eyes     Yeast, Dried Headache     brewrys yeast only     Medications:  Current Outpatient Prescriptions   Medication Sig Dispense Refill     acetylcysteine (NAC) 600 mg cap capsule Take 600 mg by mouth daily.       alpha lipoic acid 100 mg cap Take 1 capsule by mouth daily.       ANTIOX#10/OM3/DHA/EPA/LUT/ZEAX (I-CAPS ORAL) Take 1 capsule by mouth 2 (two) times a day.        apixaban (ELIQUIS) 5 mg Tab tablet Take 1 tablet (5 mg total) by mouth 2 (two) times a day. 60 tablet 11     ascorbic acid (VITAMIN C) 250 MG tablet Take 500 mg by mouth 3  "(three) times a week.        calcium carbonate (OS-MALATHI) 600 mg (1,500 mg) tablet Take 420 mg by mouth daily.        cetirizine (ZYRTEC) 10 MG tablet Take 10 mg by mouth as needed for allergies.       cholecalciferol, vitamin D3, (VITAMIN D3) 2,000 unit cap Take 1 capsule by mouth daily.       clopidogrel (PLAVIX) 75 mg tablet Take 1 tablet (75 mg total) by mouth daily. 90 tablet 0     coenzyme Q10 (CO Q-10) 100 mg capsule Take 100 mg by mouth daily.       docoshexanoic acid-eicosapent 500 mg (FISH OIL) 500-100 mg cap capsule Take 1,000 mg by mouth 2 (two) times a day.        fluticasone (FLONASE) 50 mcg/actuation nasal spray 1 spray into each nostril daily as needed for rhinitis.        grape seed extract 50 mg cap Take 1 capsule by mouth every morning.       LACTOBACILLUS ACIDOPHILUS (PROBIOTIC ORAL) Take 1 capsule by mouth daily.       levOCARNitine (L-CARNITINE) 500 mg Tab Take 1 tablet by mouth daily.       lutein 20 mg Tab Take 1 tablet by mouth daily.       magnesium 200 mg Tab Take 0.5 tablets by mouth daily.        metoprolol tartrate (LOPRESSOR) 25 MG tablet Take 1/2 tab in am and 1/2 tab in pm       multivitamin with minerals tablet Take 1 tablet by mouth daily.  0     RED YEAST RICE ORAL Take 600 mg by mouth daily.        SAW PALMETTO ORAL Take by mouth daily.       hawthorn 150 mg cap Take 1 capsule by mouth daily.        No current facility-administered medications for this visit.        Objective:   Vital signs:  /80 (Patient Site: Left Arm, Patient Position: Sitting, Cuff Size: Adult Regular)  Pulse 84  Resp 16  Ht 5' 8\" (1.727 m)  Wt 170 lb (77.1 kg)  BMI 25.85 kg/m2      Physical Exam:    GENERAL APPEARANCE: Alert, cooperative and in no acute distress.  HEENT: No scleral icterus. No Xanthelasma. Oral mucuos membranes pink and moist.  NECK: JVP Nl.   CHEST: clear to auscultation  CARDIOVASCULAR: S1, S2 without murmur ,clicks or rubs. Irregular, irregular.  Radial and posterior tibial " pulses are intact and symetric.   EXTREMITIES: No cyanosis, clubbing or edema.    Results personally reviewed:  Results for orders placed during the hospital encounter of 03/28/17   Echo MELONY W Bubble [ECH12] 03/28/2017    Narrative   Atrial fibrillation throughout the exam    Left ventricle ejection fraction is normal. Left ventricular ejection   fraction estimated 55-60%. Mild left ventricular hypertrophy    Moderate left atrial enlargement. Mild right atrial enlargement    Spontaneous echo contrast in the left atrial appendage that appears   somewhat congealed concerning for early thrombus formation. Best noted on   frame 18    Negative echo contrast bubble study    When compared to the previous study dated 9/6/2016, the current study   demonstrates fairly dense spontaneous echo contrast with some congealing   of the contrast suggestive of early thrombus formation. The more formed   thrombus at the apex of the left atrial appendage previously identified is   less noted on the current examination.         .    Results for orders placed or performed during the hospital encounter of 11/20/17   ECG 12 lead with MUSE   Result Value Ref Range    SYSTOLIC BLOOD PRESSURE  mmHg    DIASTOLIC BLOOD PRESSURE  mmHg    VENTRICULAR RATE 69 BPM    ATRIAL RATE 359 BPM    P-R INTERVAL  ms    QRS DURATION 94 ms    Q-T INTERVAL 400 ms    QTC CALCULATION (BEZET) 428 ms    P Axis  degrees    R AXIS -52 degrees    T AXIS -34 degrees    MUSE DIAGNOSIS       Atrial flutter with variable A-V block  Left anterior fascicular block  Abnormal ECG  When compared with ECG of 17-MAR-2016 11:29,  Premature ventricular complexes  now absent  Confirmed by ANDERSON BRITT MD LOC:[facility] ([id number]) on 11/20/2017 7:25:55 PM         TSH:   Lab Results   Component Value Date    TSH 2.63 06/15/2017     BNP:   Lab Results   Component Value Date     (H) 07/09/2015     BMP:  Lab Results   Component Value Date    CREATININE 1.29 11/20/2017    BUN 24  11/20/2017     11/20/2017    K 4.8 11/20/2017     11/20/2017    CO2 22 11/20/2017       This note has been dictated using voice recognition software. Any grammatical or context distortions are unintentional and inherent to the software.    ALMA BUCKLEY RN, [facility] HEART [facility]  [phone]

## 2021-06-17 NOTE — PROGRESS NOTES
[place] Study Inclusion / Exlcusion Criteria  Protocol Version 8    Inclusion Criteria    Yes No  Subjects must meet all the following inclusion criteria to be enrolled:   [x] [] 1 Able to read, understand, and provide written informed consent   [x] [] 2 Willing and able to participate in the study procedures as described in the consent form   [x] [] 3 Individuals who are 22 years of age and older   [x] [] 4 Able to communicate effectively with and follow instructions from the study staff   [x] [] 5 Have a wrist circumference between 130 mm and 245 mm (measured at  band center  on the preferred wrist. This location is determined by asking the volunteer to put on a normal wrist-watch and marking the skin with a pen/marker to outline the edges of the band.)   Exclusion criteria   Subjects who meet any of the following criteria may not be enrolled:   Yes No     [] [x] 1 Physical disability that precludes safe and adequate testing    [] [x] 2 Mental impairment resulting in limited ability to cooperate    [] [x] 3 Subjects with a pacemaker or implantable cardioverter-defibrillator (ICD)   [] [x] 4 Acute myocardial infarction (MI) within 90 days of screening or other cardiovascular disease that, in the opinion of the Investigator, increases the risk to the subject or renders data uninterpretable (e.g., recent or ongoing unstable angina, significant valvular heart disease or heart failure, myocarditis or pericarditis)   [] [x] 5 Acute pulmonary embolism, pulmonary infarction, or deep vein thrombosis within 90 days of screening   [] [x] 6 Stroke or transient ischemic attack within 90 days of screening    [] [x] 7 Subjects taking rhythm control drugs (e.g., disopyramide, quinidine, flecainide, propafenone, amiodarone, dofetilide, dronedarone, sotalol, procainamide, ibutilide, moricizine, procainamide).   An exception will be made for patients who are undergoing scheduled cardioversion for known AF within 30 days after  study participation. These study participants will be allowed to participate in the study even when on rhythm control drugs. Rate control and anti-coagulants, anti-platelet medications are permitted (e.g., metoprolol, atenolol, diltiazem, coumadin, clopidogrel, aspirin)      Rate control and anti-coagulants, anti-platelet medications are permitted (e.g., metoprolol, atenolol, diltiazem, coumadin, clopidogrel, aspirin)   [] [x] 8 Symptomatic (or active) allergic skin reactions such as eczema, rosacea, impetigo, dermatomyositis or allergic contact dermatitis on both wrists or over electrode attachment sites   [] [x] 9 Known sensitivity to medical adhesives, isopropyl alcohol, watch bands, or electrocardiogram (ECG) electrodes including known allergy or sensitivity to fluoroelastomer bands primarily used in wrist worn fitness devices   [] [x] 10 A history of abnormal life-threatening rhythms as determined by the investigator (e.g., ventricular tachycardia, ventricular fibrillation, 3rd-degree heart block, resting heart rate < 50 beats per minute (bpm), resting heart rate >110 bpm)   [] [x] 11 Significant tremor that prevents subject from being able to hold still   [] [x] 12 Pregnant women: Women who are pregnant at the time of study participation   [] [x] 13 Individuals who have participated in an ECG device study in the past 9 months in the [facility]   [] [x] 14 Occupational exclusion: Employees of the following types of companies       *Technology-focused media companies (e.g., television, magazines, newspapers,       *Health, wellness, or fitness device companies (e.g., step, sleep, or ECG monitors, or general  fitness bands )        Tiffany Samuel RN    Above reviewed, agree

## 2021-06-20 NOTE — PROGRESS NOTES
"  [facility]      NAME:  Son Huizar             :  1935    MRN: [id number]    CODE STATUS:  FULL CODE    FACILITY: [facility] [[id number]]         CHIEF COMPLAIN/REASON FOR VISIT:  Chief Complaint   Patient presents with     Review Of Multiple Medical Conditions       HISTORY OF PRESENT ILLNESS: Son Huizar is a 83 y.o. male being seen at the request of the nurses as pt is a new admit from [facility] and is requring med review and POLST review. He presented  with stroke symtoms. He has PMH which includes: CVA, , chronic atrial fibrillation, aortic insufficiency, left atrial appendage occlusion device with associated clot on chronic anticoagulation with apixaban and Plavix, presented to the hospital on 2018 with slurred speech and dizziness. CTA head was negative, however, MRI showed acute/subacute stroke of the left thalamus. He was not a candidate for TPA due to use of anticoagulation and time of onset contraindication. He was admitted to the hospital for further evaluation. He has been started on simvastatin. Per pt he isnt to keen on the simvastin but realizes his lipids are high causing his \"stroke\". He is seen in his room this am and we discussed medications and POLST. Speech is clear and he his residula right sided weakness.    Allergies   Allergen Reactions     Pollen Itching     Runny nose, sneezing, itchy eyes      Dust [Other Environmental Allergy] Itching     Running nose. Itchy eye     Ragweed Itching     Running nose, itchy eyes     Yeast, Dried Headache     brewrys yeast only   :     Current Outpatient Prescriptions   Medication Sig     alpha lipoic acid 100 mg cap Take 100 mg by mouth daily.     ascorbic acid, vitamin C, (ASCORBIC ACID WITH NATHALIE HIPS) 500 MG tablet Take 500 mg by mouth daily.     b complex vitamins tablet Take by mouth daily. B Complex 50 Tablet (B Complex Vitamins) Give 1  tablet by mouth one time a day for Supplement     " B.ani/L.aci/L.sal/L.plan/L.Lamont (PROBIOTIC FORMULA ORAL) Take 1 capsule by mouth daily.     cholecalciferol, vitamin D3, 1,000 unit tablet Take 2,000 Units by mouth daily.     clopidogrel (PLAVIX) 75 mg tablet Take 1 tablet (75 mg total) by mouth daily.     coenzyme Q10 (CO Q-10) 100 mg capsule Take 100 mg by mouth daily.     ELIQUIS 5 mg Tab tablet Take 1 tablet (5 mg total) by mouth 2 (two) times a day.     fluticasone (FLONASE) 50 mcg/actuation nasal spray Apply 1 spray into each nostril daily.     fluticasone (VERAMYST) 27.5 mcg/actuation nasal spray Apply 1 spray into each nostril daily.     levOCARNitine 500 mg Tab Take 1 tablet by mouth daily as needed.     magnesium 200 mg Tab Take 200 mg by mouth daily.     metoprolol tartrate (LOPRESSOR) 25 MG tablet Take 12.5 mg by mouth 2 (two) times a day.      multivitamin with minerals tablet Take 1 tablet by mouth daily.     omega-3/dha/epa/fish oil (FISH OIL-OMEGA-3 FATTY ACIDS) 300-1,000 mg capsule Take 1 g by mouth daily.     SAW PALMETTO ORAL Take by mouth daily. Saw Palmetto (Serenoa repens) Capsule 320 MG  Give 640 mg by mouth one time a day for Supplement     simvastatin (ZOCOR) 20 MG tablet Take 1 tablet (20 mg total) by mouth daily. (Patient taking differently: Take 20 mg by mouth at bedtime. )     zinc gluconate 30 mg Tab Take 30 mg by mouth daily.         REVIEW OF SYSTEMS:    Currently, no fever, chills, or rigors. Does not have any visual or hearing problems. Denies any chest pain, headaches, palpitations, lightheadedness, dizziness, shortness of breath, or cough. Appetite is good. Denies any GERD symptoms. Denies any difficulty with swallowing, nausea, or vomiting.  Denies any abdominal pain, diarrhea or constipation. Denies any urinary symptoms. No insomnia. No active bleeding. No rash.       PHYSICAL EXAMINATION:  Vitals:    09/25/18 0538   BP: 108/66   Pulse: 65   Temp: 98  F (36.7  C)   Weight: 170 lb (77.1 kg)         GENERAL: Awake, Alert, oriented  , not in any form of acute distress, answers questions appropriately, follows simple commands, conversant  HEENT: Head is normocephalic with normal hair distribution. No evidence of trauma. Ears: No acute purulent discharge. Eyes: Conjunctivae pink with no scleral jaundice. Nose: Normal mucosa and septum. NECK: Supple with no cervical or supraclavicular lymphadenopathy. Trachea is midline.   CHEST: No tenderness or deformity, no crepitus  LUNG: Clear to auscultation with good chest expansion. There are no crackles or wheezes, normal AP diameter.  BACK: No kyphosis of the thoracic spine. Symmetric, no curvature, ROM normal, no CVA tenderness, no spinal tenderness   CVS: There is good S1  S2, rhythm is irregular.  ABDOMEN: Globular and soft, nontender to palpation, non distended, no masses, no organomegaly, good bowel sounds, no rebound or guarding, no peritoneal signs.   EXTREMITIES: Atraumatic. Full range of motion on both upper and lower extremities, +1 edema, residula weakness right side  SKIN: Warm and dry, no erythema noted, no rashes or lesions.  NEUROLOGICAL: The patient is oriented to person, place and time. Strength and sensation are grossly intact. Face is symmetric.                    LABS:    Lab Results   Component Value Date    WBC 6.6 09/14/2018    HGB 16.4 09/14/2018    HCT 47.6 09/14/2018    MCV 89 09/14/2018     09/14/2018       Results for orders placed or performed during the hospital encounter of 09/14/18   Basic Metabolic Panel   Result Value Ref Range    Sodium 140 136 - 145 mmol/L    Potassium 4.1 3.5 - 5.0 mmol/L    Chloride 104 98 - 107 mmol/L    CO2 26 22 - 31 mmol/L    Anion Gap, Calculation 10 5 - 18 mmol/L    Glucose 155 (H) 70 - 125 mg/dL    Calcium 9.9 8.5 - 10.5 mg/dL    BUN 21 8 - 28 mg/dL    Creatinine 1.25 0.70 - 1.30 mg/dL    GFR MDRD Af Amer >60 >60 mL/min/1.73m2    GFR MDRD Non Af Amer 55 (L) >60 mL/min/1.73m2           No results found for: HGBA1C  Vitamin D, Total  (25-Hydroxy)   Date Value Ref Range Status   06/15/2017 49.9 30.0 - 80.0 ng/mL Final     Lab Results   Component Value Date    [id number] 923 (H) 06/15/2017       ASSESSMENT/PLAN:  1. Lt. Thalamic infarction (H)    2. Left atrial appendage thrombus    3. Advanced care planning/counseling discussion      1. Infarct: On Plavix and eloquis, see med list. [facility].  2. Advanced care planning: POLST reviewed and signed, pt is full code and we will honor wishes.  MCS will continue with POC as it is developed and actively participate in pts POC.      Electronically signed by:  Racheal Bradshaw CNP  This progress note was completed using Dragon software and there may be grammatical errors.      35 minutes spent of which greater than 75% was face to face communication with the patient about above plan of care

## 2021-06-20 NOTE — PROGRESS NOTES
"  [facility]      NAME:  Son Huizar             :  1935    MRN: [id number]    CODE STATUS:  FULL CODE    FACILITY: [facility] [[id number]]         CHIEF COMPLAIN/REASON FOR VISIT:  Chief Complaint   Patient presents with     Review Of Multiple Medical Conditions       HISTORY OF PRESENT ILLNESS: Son Huizar is a 83 y.o. male  Seen for review of medical conditions. Recently transferred from [facility]  He presented  with stroke symtoms. He has PMH which includes: CVA, , chronic atrial fibrillation, aortic insufficiency, left atrial appendage occlusion device with associated clot on chronic anticoagulation with apixaban and Plavix, presented to the hospital on 2018 with slurred speech and dizziness. CTA head was negative, however, MRI showed acute/subacute stroke of the left thalamus. He was not a candidate for TPA due to use of anticoagulation and time of onset contraindication. He was admitted to the hospital for further evaluation. He has been started on simvastatin. Per pt he isnt to keen on the simvastin but realizes his lipids are high causing his \"stroke\". He is seen in his room this am and we discussed medications and POLST. Speech is clear and he his residull right sided weakness, dependant on wc for mobility but can stand and transfer with SBA.    Allergies   Allergen Reactions     Pollen Itching     Runny nose, sneezing, itchy eyes      Dust [Other Environmental Allergy] Itching     Running nose. Itchy eye     Ragweed Itching     Running nose, itchy eyes     Yeast, Dried Headache     brewrys yeast only   :     Current Outpatient Prescriptions   Medication Sig     alpha lipoic acid 100 mg cap Take 100 mg by mouth daily.     ascorbic acid, vitamin C, (ASCORBIC ACID WITH NATHALIE HIPS) 500 MG tablet Take 500 mg by mouth daily.     b complex vitamins tablet Take by mouth daily. B Complex 50 Tablet (B Complex Vitamins) Give 1  tablet by mouth one time a " day for Supplement     B.ani/L.aci/L.sal/L.plan/L.Lamont (PROBIOTIC FORMULA ORAL) Take 1 capsule by mouth daily.     cholecalciferol, vitamin D3, 1,000 unit tablet Take 2,000 Units by mouth daily.     clopidogrel (PLAVIX) 75 mg tablet Take 1 tablet (75 mg total) by mouth daily.     coenzyme Q10 (CO Q-10) 100 mg capsule Take 100 mg by mouth daily.     ELIQUIS 5 mg Tab tablet Take 1 tablet (5 mg total) by mouth 2 (two) times a day.     fluticasone (FLONASE) 50 mcg/actuation nasal spray Apply 1 spray into each nostril daily.     fluticasone (VERAMYST) 27.5 mcg/actuation nasal spray Apply 1 spray into each nostril daily.     levOCARNitine 500 mg Tab Take 1 tablet by mouth daily as needed.     magnesium 200 mg Tab Take 200 mg by mouth daily.     metoprolol tartrate (LOPRESSOR) 25 MG tablet Take 12.5 mg by mouth 2 (two) times a day.      multivitamin with minerals tablet Take 1 tablet by mouth daily.     omega-3/dha/epa/fish oil (FISH OIL-OMEGA-3 FATTY ACIDS) 300-1,000 mg capsule Take 1 g by mouth daily.     SAW PALMETTO ORAL Take by mouth daily. Saw Palmetto (Serenoa repens) Capsule 320 MG  Give 640 mg by mouth one time a day for Supplement     simvastatin (ZOCOR) 20 MG tablet Take 1 tablet (20 mg total) by mouth daily. (Patient taking differently: Take 20 mg by mouth at bedtime. )     zinc gluconate 30 mg Tab Take 30 mg by mouth daily.         REVIEW OF SYSTEMS:    Currently, no fever, chills, or rigors. Does not have any visual or hearing problems. Denies any chest pain, headaches, palpitations, lightheadedness, dizziness, shortness of breath, or cough. Appetite is good. Denies any GERD symptoms. Denies any difficulty with swallowing, nausea, or vomiting.  Denies any abdominal pain, diarrhea or constipation. Denies any urinary symptoms. No insomnia. No active bleeding. No rash.       PHYSICAL EXAMINATION:  Vitals:    09/26/18 1629   BP: 118/82   Pulse: 79   Temp: 97.7  F (36.5  C)   Weight: 170 lb (77.1 kg)          GENERAL: Awake, Alert, oriented , not in any form of acute distress, answers questions appropriately, follows simple commands, conversant  HEENT: Head is normocephalic with normal hair distribution. No evidence of trauma. Ears: No acute purulent discharge. Eyes: Conjunctivae pink with no scleral jaundice. Nose: Normal mucosa and septum. NECK: Supple with no cervical or supraclavicular lymphadenopathy. Trachea is midline.   CHEST: No tenderness or deformity, no crepitus  LUNG: Clear to auscultation with good chest expansion. There are no crackles or wheezes, normal AP diameter.  BACK: No kyphosis of the thoracic spine. Symmetric, no curvature, ROM normal, no CVA tenderness, no spinal tenderness   CVS: There is good S1  S2, rhythm is irregular.  ABDOMEN: Globular and soft, nontender to palpation, non distended, no masses, no organomegaly, good bowel sounds, no rebound or guarding, no peritoneal signs.   EXTREMITIES: Atraumatic. Full range of motion on both upper and lower extremities, +1 edema, residula weakness right side  SKIN: Warm and dry, no erythema noted, no rashes or lesions.  NEUROLOGICAL: The patient is oriented to person, place and time. Strength and sensation are grossly intact. Face is symmetric.                    LABS:    Lab Results   Component Value Date    WBC 6.6 09/14/2018    HGB 16.4 09/14/2018    HCT 47.6 09/14/2018    MCV 89 09/14/2018     09/14/2018       Results for orders placed or performed during the hospital encounter of 09/14/18   Basic Metabolic Panel   Result Value Ref Range    Sodium 140 136 - 145 mmol/L    Potassium 4.1 3.5 - 5.0 mmol/L    Chloride 104 98 - 107 mmol/L    CO2 26 22 - 31 mmol/L    Anion Gap, Calculation 10 5 - 18 mmol/L    Glucose 155 (H) 70 - 125 mg/dL    Calcium 9.9 8.5 - 10.5 mg/dL    BUN 21 8 - 28 mg/dL    Creatinine 1.25 0.70 - 1.30 mg/dL    GFR MDRD Af Amer >60 >60 mL/min/1.73m2    GFR MDRD Non Af Amer 55 (L) >60 mL/min/1.73m2           No results  found for: HGBA1C  Vitamin D, Total (25-Hydroxy)   Date Value Ref Range Status   06/15/2017 49.9 30.0 - 80.0 ng/mL Final     Lab Results   Component Value Date    XAWFXGPF27 923 (H) 06/15/2017       ASSESSMENT/PLAN:  1. Lt. Thalamic infarction (H)    2. Left atrial appendage thrombus      1. Infarct: On Plavix and eloquis, see med list. He is in therapy at  The TCU for strengthening and rehab, some residual weakness to right side and still using wc for mobility.  He is to see PT/OT for ongoing rehab as his POC is developed and he progressess with his program.        MCS will continue with POC as it is developed and actively participate in pts POC.      Electronically signed by:  Racheal Bradshaw CNP  This progress note was completed using Dragon software and there may be grammatical errors.      25 minutes spent of which greater than 75% was face to face communication with the patient about above plan of care

## 2021-06-20 NOTE — PROGRESS NOTES
[facility]      Code Status:  FULL CODE  Visit Type: Review Of Multiple Medical Conditions     Facility:  [facility] [[id number]]           History of Present Illness: Son Huizar is a 83 y.o. male who is admitted to TCU.  Son Huizar is 83 y.o. male with past medical history of stroke, chronic atrial fibrillation, aortic insufficiency, left atrial appendage occlusion device with associated clot on chronic anticoagulation with apixaban and Plavix, presented to the hospital on 9/14/2018 with slurred speech and dizziness. CTA head was negative, however, MRi showed acute/subacute stroke of the left thalamus. He was not a candidate for tPA due to use of anticoagulation and time of onset contraindication. He was admitted to the hospital for further evaluation. He was started on simvastatin.  He elected to discharge home.  He did not do well and presented back to the hospital and was readmitted to TCU.  Currently working on therapy to rebuild his strength and stamina he lives alone in his own home.  He has been complaining of more right lower extremity weakness with buckling but is adamant that he will not  wear a brace.  Has some concerns about care issues but overall doing well    Past Medical History:   Diagnosis Date     Aortic valve insufficiency 7/11/2015    Mild-moderate AI echo July 2015      Arthritis 2010    hands     Atrial fibrillation (H)      Atrial flutter (H) 07/10/2015     Essential hypertension 2014     Fall      Hematuria 07/sep/2007    unknown per pt     Hyperlipidemia 4/13/2018     Hypertension      Left atrial thrombus 5/3/2016    MELONY 3/9/2016      Macular degeneration 2015     Nontraumatic intracerebral hemorrhage (H)      Persistent atrial fibrillation (H) 7/24/2015     Stroke (H) 03/04/2014     Stroke (H)     acute left thalamic stroke     Thrombus of right atrial appendage     per H&P     Past Surgical History:   Procedure Laterality Date     CATARACT  EXTRACTION Bilateral      EYE SURGERY      cataract surgery     HERNIA REPAIR  2015     HERNIA REPAIR       KNEE SURGERY Left      NEUROPLASTY / TRANSPOSITION MEDIAN NERVE AT CARPAL TUNNEL BILATERAL       ROTATOR CUFF REPAIR Left     one     ROTATOR CUFF REPAIR Right     two times     SHOULDER SURGERY       Family History   Problem Relation Age of Onset     Cancer Father      No Medical Problems Mother      Cancer Sister      No Medical Problems Brother      Social History     Social History     Marital status:      Spouse name: N/A     Number of children: N/A     Years of education: N/A     Occupational History     sales for heavy equipment      Social History Main Topics     Smoking status: Never Smoker     Smokeless tobacco: Never Used     Alcohol use No     Drug use: No     Sexual activity: Not on file     Other Topics Concern     Not on file     Social History Narrative    Lives with family.      Current Outpatient Prescriptions   Medication Sig Dispense Refill     alpha lipoic acid 100 mg cap Take 100 mg by mouth daily.       ascorbic acid, vitamin C, (ASCORBIC ACID WITH NATHALIE HIPS) 500 MG tablet Take 500 mg by mouth daily.       b complex vitamins tablet Take by mouth daily. B Complex 50 Tablet (B Complex Vitamins) Give 1  tablet by mouth one time a day for Supplement       B.ani/L.aci/L.sal/L.plan/L.Lamont (PROBIOTIC FORMULA ORAL) Take 1 capsule by mouth daily.       cholecalciferol, vitamin D3, 1,000 unit tablet Take 2,000 Units by mouth daily.       clopidogrel (PLAVIX) 75 mg tablet Take 1 tablet (75 mg total) by mouth daily. 90 tablet 3     coenzyme Q10 (CO Q-10) 100 mg capsule Take 100 mg by mouth daily.       ELIQUIS 5 mg Tab tablet Take 1 tablet (5 mg total) by mouth 2 (two) times a day. 180 tablet 2     fluticasone (FLONASE) 50 mcg/actuation nasal spray Apply 1 spray into each nostril daily.       fluticasone (VERAMYST) 27.5 mcg/actuation nasal spray Apply 1 spray into each nostril daily.        levOCARNitine 500 mg Tab Take 1 tablet by mouth daily as needed.       magnesium 200 mg Tab Take 200 mg by mouth daily.       metoprolol tartrate (LOPRESSOR) 25 MG tablet Take 12.5 mg by mouth 2 (two) times a day.        multivitamin with minerals tablet Take 1 tablet by mouth daily.       omega-3/dha/epa/fish oil (FISH OIL-OMEGA-3 FATTY ACIDS) 300-1,000 mg capsule Take 1 g by mouth daily.       SAW PALMETTO ORAL Take by mouth daily. Saw Palmetto (Serenoa repens) Capsule 320 MG  Give 640 mg by mouth one time a day for Supplement       simvastatin (ZOCOR) 20 MG tablet Take 1 tablet (20 mg total) by mouth daily. (Patient taking differently: Take 20 mg by mouth at bedtime. ) 30 tablet 0     zinc gluconate 30 mg Tab Take 30 mg by mouth daily.       No current facility-administered medications for this visit.      Allergies   Allergen Reactions     Pollen Itching     Runny nose, sneezing, itchy eyes      Dust [Other Environmental Allergy] Itching     Running nose. Itchy eye     Ragweed Itching     Running nose, itchy eyes     Yeast, Dried Headache     brewrys yeast only     Review of Systems:    Constitutional: Negative.  Negative for fever, chills,has  activity change, appetite change and fatigue.   HENT: Negative for congestion and facial swelling.    Eyes: Negative for photophobia, redness and visual disturbance.   Respiratory: Negative for cough and chest tightness.    Cardiovascular: Negative for chest pain, palpitations and leg swelling.   Gastrointestinal: Negative for nausea, diarrhea, constipation, blood in stool and abdominal distention.   Genitourinary: Negative.    Musculoskeletal: Negative.  noticing more tremors and balance issues.  Walking well but complaining of more right leg pain and weakness  Skin: Negative.    Neurological: Negative for dizziness, tremors, syncope, weakness, light-headedness and headaches.   Hematological: Does not bruise/bleed easily.   Psychiatric/Behavioral: Negative.   anxious    BP  104 /69 T98 WT 170LB    Physical Exam:    GENERAL: no acute distress. Cooperative in conversation.   HEENT: pupils are equal, round and reactive. Oral mucosa is moist and intact.  RESP:Chest symmetric. Regular respiratory rate. No stridor.  CVS: S1S2  ABD: Nondistended, soft.  EXTREMITIES: No lower extremity edema. LIMITED ABDUCTION OF RT SHOULDER  TREMORS IN RUE  NEURO: non focal. Alert and oriented x3.  Reporting right-sided weakness with tremors in the right upper extremity.  Abduction is limited due to a previous shoulder injury.  PSYCH: within normal limits. No depression or anxiety.  SKIN: warm dry intact     Labs:  .      Assessment/Plan:    Left thalamic infarction    MRI head shows left thalamic infarct in addition to a chronic lacunar infarct in right internal capsule with areas of chronic microhemorrhages and basal ganglia and left cerebellum    Echocardiogram shows ejection fraction 56% with moderate left ventricular hypertrophy and question of amyloidosis was raised.      Continue Plavix, Eliquis.  Simvastatin added.  Dyslipidemia. LDL of 132. Goal LDL of <70. He is very reluctant to start statin. He was started on simvastatin .  Chronic atrial fibrillation. Rate controlled with metoprolol. He is anticoagulated with eliquis.  Essential hypertension. Stable blood pressure. He is on metoprolol.  Polypharmacy-on multiple supplement  History of left atrial appendage  Debilitation in this elderly patient post CVA elected to discharge home he lives alone by himself did not do well and has elected to come back to the TCU for strengthening and rehab  He is requesting a full CODE STATUS and hoping to discharge soon.  His initial testing cognitive score was a slums of 17/30.  Recheck CPT however is improved at 5.1.  Balance score includes score of 20/28.  His discharge plan is to go home with his spouse but he wants to resume his independent and resume driving  He is adamant that he will not wear a  brace  Care plan also reviewed with daughter and son-in-law present in the room and their concerns were answered  Total time spent was 35 minutes, more than half of it was in face-to-face counseling regarding disease state, treatment, side effects, documentation, review of clinical data and coordination of care    Electronically signed by: ELÍAS Jaramillo  This progress note was completed using Dragon software and there may be grammatical errors.

## 2021-06-20 NOTE — PROGRESS NOTES
[facility]      NAME:  Son Huizar             :  1935  MRN: [id number]  CODE STATUS:  FULL CODE    VISIT TYPE: DISCHARGE SUMMARY  FACILYTY: [facility] [[id number]]       HOSPITALIZATION :[facility],  to 2018               PRIMARY CARE PROVIDER: Garcia Dahl MD    DISCHARGE DIAGNOSIS:      1. Lt. Thalamic infarction (H)    2. Left atrial appendage thrombus         DISCHARGE MEDICATIONS:         Medication List          These changes are accurate as of 10/10/18  4:23 PM.  If you have any questions, ask your nurse or doctor.               CHANGE how you take these medications          simvastatin 20 MG tablet   Commonly known as:  ZOCOR   Take 1 tablet (20 mg total) by mouth daily.   What changed:  when to take this         CONTINUE taking these medications          alpha lipoic acid 100 mg Cap       ascorbic acid with shahida hips 500 MG tablet   Generic drug:  ascorbic acid (vitamin C)       b complex vitamins tablet       cholecalciferol (vitamin D3) 1,000 unit tablet       clopidogrel 75 mg tablet   Commonly known as:  PLAVIX   Take 1 tablet (75 mg total) by mouth daily.       CO Q-10 100 mg capsule   Generic drug:  coenzyme Q10       ELIQUIS 5 mg Tab tablet   Generic drug:  apixaban   Take 1 tablet (5 mg total) by mouth 2 (two) times a day.       fish oil-omega-3 fatty acids 300-1,000 mg capsule       fluticasone 27.5 mcg/actuation nasal spray   Commonly known as:  VERAMYST       fluticasone 50 mcg/actuation nasal spray   Commonly known as:  FLONASE       levOCARNitine 500 mg Tab       magnesium 200 mg Tab       metoprolol tartrate 25 MG tablet   Commonly known as:  LOPRESSOR       multivitamin with minerals tablet       PROBIOTIC FORMULA ORAL       SAW PALMETTO ORAL       zinc gluconate 30 mg Tab             HISTORY OF PRESENT ILLNESS: Son Huizar is a 83 y.o. male being seen for dc from the TCU.     SKILLED NURSING FACILITY COURSE:  During this  TCU stay, patient completed all anticipated goals of therapy.  He has a past medical history of stroke, chronic atrial fibrillation, aortic insufficiency, left atrial appendage occlusion device with associated clot on chronic anticoagulation with apixaban and Plavix, presented to the hospital on 9/14/2018 with slurred speech and vertigo. CTA head was negative, however, MRi showed acute/subacute stroke of left thalamus. He was not a candidate for TPA due to use of anticoagulation and time of onset contraindication. He was admitted to the hospital for further evaluation. He was started on simvastatin and chose   to discharge home.  He did not do well and presented back to the hospital and was admitted to the   TCU.  We are highly encouraging OP therapy, however he is not certain he needs more therapy.    PHYSICAL EXAMINATION:    Vitals:    10/10/18 1620   BP: 110/72   Pulse: 87   Temp: 97  F (36.1  C)   Weight: 170 lb (77.1 kg)       GENERAL: no acute distress. Cooperative in conversation.   HEENT: pupils are equal, round and reactive. Oral mucosa is moist and intact.  RESP:Chest symmetric. Regular respiratory rate. Clear to auscultation  CVS: S1S2  ABD: Nondistended, soft.  EXTREMITIES: No lower extremity edema.   NEURO: non focal. Alert and oriented x3.  Reporting right-sided weakness with tremors in the right upper extremity observed tion is limited due to a previous shoulder injury.  PSYCH: within normal limits. No depression or anxiety.  SKIN: warm dry intact , no excessive bruising noted to exposed skin.           LABS:  All labs reviewed in the nursing home record.        DISCHARGE PLAN:  Patient decline home care or is not home bound following discharge.  It is recommeneded for patient to continue PT/OT on an outpatient basis through [facility].    Patient will follow up with PCP within 7  days after discharge for medication mangagment and appropriate lab studies.          Electronically signed by:  Racheal  MARIA DEL ROSARIO Bradshaw  This progress note was completed using Dragon software and there may be grammatical errors.      For documentation purposes, chart review, medication management, and discharge coordination of care was greater than 35 minutes

## 2021-06-20 NOTE — PROGRESS NOTES
[facility]      Code Status:  FULL CODE  Visit Type: H & P     Facility:  [facility] [[id number]]           History of Present Illness: Son Huizar is a 83 y.o. male who is admitted to TCU.  Son Huizar is 83 y.o. male with past medical history of stroke, chronic atrial fibrillation, aortic insufficiency, left atrial appendage occlusion device with associated clot on chronic anticoagulation with apixaban and Plavix, presented to the hospital on 9/14/2018 with slurred speech and dizziness. CTA head was negative, however, MRi showed acute/subacute stroke of the left thalamus. He was not a candidate for tPA due to use of anticoagulation and time of onset contraindication. He was admitted to the hospital for further evaluation. He was started on simvastatin.  He elected to discharge home.  He did not do well and presented back to the hospital and was readmitted to TCU.  Currently working on therapy to rebuild his strength and stamina he lives alone in his own home  Past Medical History:   Diagnosis Date     Aortic valve insufficiency 7/11/2015    Mild-moderate AI echo July 2015      Arthritis 2010    hands     Atrial fibrillation (H)      Atrial flutter (H) 07/10/2015     Essential hypertension 2014     Fall      Hematuria 07/sep/2007    unknown per pt     Hyperlipidemia 4/13/2018     Hypertension      Left atrial thrombus 5/3/2016    MELONY 3/9/2016      Macular degeneration 2015     Nontraumatic intracerebral hemorrhage (H)      Persistent atrial fibrillation (H) 7/24/2015     Stroke (H) 03/04/2014     Stroke (H)     acute left thalamic stroke     Thrombus of right atrial appendage     per H&P     Past Surgical History:   Procedure Laterality Date     CATARACT EXTRACTION Bilateral      EYE SURGERY      cataract surgery     HERNIA REPAIR  2015     HERNIA REPAIR       KNEE SURGERY Left      NEUROPLASTY / TRANSPOSITION MEDIAN NERVE AT CARPAL TUNNEL BILATERAL       ROTATOR CUFF  REPAIR Left     one     ROTATOR CUFF REPAIR Right     two times     SHOULDER SURGERY       Family History   Problem Relation Age of Onset     Cancer Father      No Medical Problems Mother      Cancer Sister      No Medical Problems Brother      Social History     Social History     Marital status:      Spouse name: N/A     Number of children: N/A     Years of education: N/A     Occupational History     sales for heavy equipment      Social History Main Topics     Smoking status: Never Smoker     Smokeless tobacco: Never Used     Alcohol use No     Drug use: No     Sexual activity: Not on file     Other Topics Concern     Not on file     Social History Narrative    Lives with family.      Current Outpatient Prescriptions   Medication Sig Dispense Refill     alpha lipoic acid 100 mg cap Take 100 mg by mouth daily.       ascorbic acid, vitamin C, (ASCORBIC ACID WITH NATHALIE HIPS) 500 MG tablet Take 500 mg by mouth daily.       b complex vitamins tablet Take by mouth daily. B Complex 50 Tablet (B Complex Vitamins) Give 1  tablet by mouth one time a day for Supplement       B.ani/L.aci/L.sal/L.plan/L.Lamont (PROBIOTIC FORMULA ORAL) Take 1 capsule by mouth daily.       cholecalciferol, vitamin D3, 1,000 unit tablet Take 2,000 Units by mouth daily.       clopidogrel (PLAVIX) 75 mg tablet Take 1 tablet (75 mg total) by mouth daily. 90 tablet 3     coenzyme Q10 (CO Q-10) 100 mg capsule Take 100 mg by mouth daily.       ELIQUIS 5 mg Tab tablet Take 1 tablet (5 mg total) by mouth 2 (two) times a day. 180 tablet 2     fluticasone (FLONASE) 50 mcg/actuation nasal spray Apply 1 spray into each nostril daily.       fluticasone (VERAMYST) 27.5 mcg/actuation nasal spray Apply 1 spray into each nostril daily.       levOCARNitine 500 mg Tab Take 1 tablet by mouth daily as needed.       magnesium 200 mg Tab Take 200 mg by mouth daily.       metoprolol tartrate (LOPRESSOR) 25 MG tablet Take 12.5 mg by mouth 2 (two) times a day.         multivitamin with minerals tablet Take 1 tablet by mouth daily.       omega-3/dha/epa/fish oil (FISH OIL-OMEGA-3 FATTY ACIDS) 300-1,000 mg capsule Take 1 g by mouth daily.       SAW PALMETTO ORAL Take by mouth daily. Saw Palmetto (Serenoa repens) Capsule 320 MG  Give 640 mg by mouth one time a day for Supplement       simvastatin (ZOCOR) 20 MG tablet Take 1 tablet (20 mg total) by mouth daily. (Patient taking differently: Take 20 mg by mouth at bedtime. ) 30 tablet 0     zinc gluconate 30 mg Tab Take 30 mg by mouth daily.       No current facility-administered medications for this visit.      Allergies   Allergen Reactions     Pollen Itching     Runny nose, sneezing, itchy eyes      Dust [Other Environmental Allergy] Itching     Running nose. Itchy eye     Ragweed Itching     Running nose, itchy eyes     Yeast, Dried Headache     brewrys yeast only     Review of Systems:    Constitutional: Negative.  Negative for fever, chills,has  activity change, appetite change and fatigue.   HENT: Negative for congestion and facial swelling.    Eyes: Negative for photophobia, redness and visual disturbance.   Respiratory: Negative for cough and chest tightness.    Cardiovascular: Negative for chest pain, palpitations and leg swelling.   Gastrointestinal: Negative for nausea, diarrhea, constipation, blood in stool and abdominal distention.   Genitourinary: Negative.    Musculoskeletal: Negative.  noticing more tremors and balance issues  Skin: Negative.    Neurological: Negative for dizziness, tremors, syncope, weakness, light-headedness and headaches.   Hematological: Does not bruise/bleed easily.   Psychiatric/Behavioral: Negative.  anxious    BP  130/79 T98 WT 170LB    Physical Exam:    GENERAL: no acute distress. Cooperative in conversation.   HEENT: pupils are equal, round and reactive. Oral mucosa is moist and intact.  RESP:Chest symmetric. Regular respiratory rate. No stridor.  CVS: S1S2  ABD: Nondistended,  soft.  EXTREMITIES: No lower extremity edema. LIMITED ABDUCTION OF RT SHOULDER  TREMORS IN RUE  NEURO: non focal. Alert and oriented x3.  Reporting right-sided weakness with tremors in the right upper extremity.  Abduction is limited due to a previous shoulder injury.  PSYCH: within normal limits. No depression or anxiety.  SKIN: warm dry intact     Labs:    Recent Results (from the past 240 hour(s))   POCT Glucose   Result Value Ref Range    Glucose, POC 73 mg/dL   POCT Glucose   Result Value Ref Range    Glucose,  mg/dL   POCT Glucose   Result Value Ref Range    Glucose, POC 98 mg/dL   Cta Head And Neck    Result Date: 9/14/2018  [facility] RADIOLOGY EXAM: CTA HEAD AND NECK LOCATION: [facility] DATE/TIME: 9/14/2018 7:57 PM INDICATION: Vertigo and slurred speech. COMPARISON: 06/26/2018 11/20/2017. TECHNIQUE: Head and neck CT angiogram with IV contrast. Noncontrast head CT followed by axial helical CT images of the head and neck vessels obtained during the arterial phase of intravenous contrast administration. Axial 2D reconstructed images and multiplanar 3D MIP reconstructed images of the head and neck vessels were performed by the technologist. Dose reduction techniques were used. CONTRAST: Iohexol (Omni) 75mL FINDINGS: NONCONTRAST HEAD CT: INTRACRANIAL CONTENTS: No intracranial hemorrhage, extraaxial collection, or mass effect.  No CT evidence of acute infarct. There is diffuse confluent low attenuation within the periventricular and subcortical white matter consistent with moderate diffuse small vessel ischemic disease. There is an old lacunar infarct again noted involving the posterior limb of the right internal capsule. The ventricular system, basal cisterns and the cortical sulci are stable in the interval and consistent with diffuse volume loss. OSSEOUS STRUCTURES/SOFT TISSUES: No significant abnormality. VISUALIZED ORBITS/SINUSES/MASTOIDS: No significant orbital abnormality. No significant  paranasal sinus mucosal disease. No significant middle ear or mastoid effusion. HEAD CTA: ANTERIOR CIRCULATION: There is no significant stenosis or occlusion of the anterior circulation. Standard [place] anatomy. POSTERIOR CIRCULATION: There is mild irregularity of the posterior cerebral arteries bilaterally which most likely is atherosclerotic in nature. This has mildly progressed in the interval. The left vertebral artery is dominant being larger than the right  vertebral artery but both vertebral arteries connect up to the basilar artery. ANEURYSM/VASCULAR MALFORMATION: None. DURAL VENOUS SINUSES: Expected enhancement of the major dural venous sinuses. NECK CTA: RIGHT CAROTID: No measurable stenosis in the right ICA based on NASCET criteria. LEFT CAROTID: No measurable stenosis in the left ICA based on NASCET criteria. VERTEBRAL ARTERIES: The left vertebral artery is dominant throughout the neck region. Both vertebral arteries are patent throughout the neck region. vertebral arteries are patent in the neck and into the head. AORTIC ARCH: Classic aortic arch anatomy with no significant stenosis at the origin of the great vessels. MISCELLANEOUS: No evidence for dissection or pseudoaneurysm. The visualized upper chest is unremarkable. There are prominent diffuse degenerative changes throughout the cervical spine.     CONCLUSION: HEAD CT: 1.  No CT finding of a mass, acute infarct or hemorrhage. 2. Stable moderate diffuse age related changes. HEAD CTA: 1.  No branch vessel occlusion, aneurysm or vascular malformation involving the proximal [place] vessels. 2.  Mild-to-moderate irregularity involving both of the posterior cerebral arteries. This is mildly progressed in the interval and most likely atherosclerotic in nature. NECK CTA: 1.  No significant stenosis in the neck vessels based on NASCET criteria. 2.  No evidence for dissection. The head CT findings were communicated to Dr. Benavides at 7:48 PM  and the CTA findings were communicated to Dr. Benavides 8:29 PM on 09/14/2018. Of note the CTA images do not load until 8:10 PM.    Mr Brain Cow Carotid With And Without Contrast    Result Date: 9/14/2018  1. HEAD MRI WITHOUT AND WITH IV CONTRAST 2. HEAD MRA WITHOUT IV CONTRAST 3. NECK MRA WITHOUT AND WITH IV CONTRAST 9/14/2018 10:40 PM  INDICATION: Vertigo and slurred speech vertigo and slurred speech TECHNIQUE: 1. Head MRI without and with IV contrast. 2. Head MRA without IV contrast. 3. Neck MRA without and with IV contrast including gadolinium bolus sequence. CONTRAST: Gadavist 8 mL COMPARISON: CTA 09/14/2018 MRI/MRA 11/20/2017 FINDINGS: HEAD MRI: Punctate focus of restricted diffusion in the left thalamus. Moderate cerebral and cerebellar volume loss. Moderate to advanced T2 signal hyperintensity in the supratentorial white matter. Chronic lacunar infarcts involving the posterior limb of the right internal capsule. There are several foci of hemosiderin blooming within the thalami, left caudate nucleus and the medial left cerebellar hemisphere. No intracranial mass or abnormal enhancement. The major intracranial vascular flow voids are  intact through the skull base. There is a partially enhancing pituitary sella which is typically an incidental finding. The pineal region is unremarkable. Evidence of previous cataract surgeries. The orbits are otherwise unremarkable. The soft tissues at the skull base and the paranasal sinuses are unremarkable. HEAD MRA: No proximal vessel occlusion or flow-limiting stenosis. There are multifocal areas of irregularity and narrowing in the anterior and posterior cerebral circulation. This includes a short segment high-grade narrowing of the left P2 segment. There are moderate stenoses involving the right P2 segment and the distal left vertebral artery. Within the limits of MRA, no convincing intracranial aneurysm or high flow vascular lesion. NECK MRA: There is no measurable stenosis  in either proximal internal carotid artery based on NASCET criteria. The left vertebral artery is of a dominant caliber. Both vertebral arteries are patent through the neck and into the head. No evidence of vessel dissection. The demonstrated aortic arch and the proximate great vessels are unremarkable.     CONCLUSION: HEAD MRI: 1.  Punctate focus of acute or early subacute cerebral infarction involving the left thalamus. 2.  Chronic lacunar infarct involving the posterior limb of the right internal capsule. 3.  Several foci of chronic microhemorrhage are demonstrated within the thalami, left caudate nucleus and medial left cerebellar hemisphere. 4.  Moderate to advanced presumed chronic small vessel ischemic changes. 5.  Moderate generalized cerebral volume loss. HEAD MRA: 1.  No proximal vessel occlusion or flow-limiting stenosis. 2.  Evidence of intracranial atherosclerosis as highlighted above. NECK MRA: 1.  Unremarkable neck MRA. Results were called to Dr. Benavides at 9/14/2018 10:56 PM.      Assessment/Plan:    Left thalamic infarction    MRI head shows left thalamic infarct in addition to a chronic lacunar infarct in right internal capsule with areas of chronic microhemorrhages and basal ganglia and left cerebellum    Echocardiogram shows ejection fraction 56% with moderate left ventricular hypertrophy and question of amyloidosis was raised.      Continue Plavix, Eliquis.  Simvastatin added.  Dyslipidemia. LDL of 132. Goal LDL of <70. He is very reluctant to start statin. He was started on simvastatin .  Chronic atrial fibrillation. Rate controlled with metoprolol. He is anticoagulated with eliquis.  Essential hypertension. Stable blood pressure. He is on metoprolol.  Polypharmacy-on multiple supplement  History of left atrial appendage  Debilitation in this elderly patient post CVA elected to discharge home he lives alone by himself did not do well and has elected to come back to the TCU for strengthening and  rehab  He is requesting a full CODE STATUS and hoping to discharge soon.  Total time spent was 45 minutes, more than half of it was in face-to-face counseling regarding disease state, treatment, side effects, documentation, review of clinical data and coordination of care    Electronically signed by: ELÍAS Jaramillo  This progress note was completed using Dragon software and there may be grammatical errors.

## 2021-06-26 NOTE — PROGRESS NOTES
Progress Notes by Nelli Roland CNP at 5/29/2018 12:50 PM     Author: Nelli Roland CNP Service: -- Author Type: Nurse Practitioner    Filed: 5/29/2018  2:20 PM Encounter Date: 5/29/2018 Status: Signed    : Nelli Roland CNP (Nurse Practitioner)           Click to link to [facility] ELECTROPHYSIOLOGY NOTE      Assessment/Recommendations   Assessment/Plan:    Diagnoses and all orders for this visit:    Chronic atrial fibrillation and rate controlled.  Asymptomatic.  October 2017 Holter demonstrates good rate control on current metoprolol dose of 25 mg p.o. twice daily with average ventricular response of 85.  EF has continued to be normal on MELONY evaluation.  He can follow in cardiology as needed.    Aortic valve insufficiency, etiology of cardiac valve disease unspecified and mild.    Left atrial appendage thrombus and recommended for long-term Plavix and anticoagulation treatment per Dr. Mejia.  Not a candidate for left atrial appendage occlusion device.  Not recommended for further MELONY evaluation of this since the same each time.    Alteration in anticoagulation secondary to thrombus in left atrial appendage.  See above    [id number] score of 2 and on Eliquis plus Plavix without problems.  Follow up in cardiology clinic as needed.     History of Present Illness    Mr. Son Huizar is a very pleasant 83 y.o. male who comes in today for EP follow-up regarding chronic atrial fibrillation.  Son Huizar has a known history of mild aortic insufficiency, mild ascending aortic dilation and repeated demonstration of  soft thrombus in the left atrial appendage despite combination of Plavix and anticoagulation.  Recent MELONY showed same soft thrombus in left atrial appendage.  He is here to discuss this study and plan.  See above.  He denies any limitations to activities and no change in energy level over the last 6 months.  He does yoga and exercises at a  gym.  He denies any cardiac symptoms on questioning.  He has some imbalance but same for him.    Cardiographics (personally reviewed):  Results for orders placed during the hospital encounter of 05/16/18   Echo MELONY W Bubble [ECH12] 05/16/2018    Narrative   Left ventricle is normal in size with moderate concentric hypertrophy.    LVEF is normal at 60%.    Right ventricle appears normal in size and systolic function.    Biatrial enlargement is identified.    There is a soft thrombus visualized in the left atrial appendage.    Mild aortic regurgitation is identified.    There is mild dilation of the aortic root and proximal ascending aorta   measuring up to 4.2 cm at the root and 3.85 cm in the proximal ascending   aorta.    Compared to prior transesophageal echocardiogram of 3/28/2017, the left   atrial appendage thrombus continues to be evident.          Results for orders placed or performed in visit on 04/23/18   ECG 12 lead with MUSE   Result Value Ref Range    SYSTOLIC BLOOD PRESSURE  mmHg    DIASTOLIC BLOOD PRESSURE  mmHg    VENTRICULAR RATE 71 BPM    ATRIAL RATE 357 BPM    P-R INTERVAL  ms    QRS DURATION 96 ms    Q-T INTERVAL 410 ms    QTC CALCULATION (BEZET) 445 ms    P Axis  degrees    R AXIS -54 degrees    T AXIS -21 degrees    MUSE DIAGNOSIS       Atrial flutter with variable A-V block  Left anterior fascicular block  Cannot rule out Anterior infarct , age undetermined  Abnormal ECG  When compared with ECG of 20-Nov-2017,   No significant change was found  Confirmed by SOURAV SIEGEL MD LOC:[facility] ([id number]) on 4/24/2018 3:19:52 PM            Problem List:  Patient Active Problem List   Diagnosis   ? Syncope   ? Aortic valve insufficiency   ? Chronic atrial fibrillation   ? Alteration in anticoagulation   ? Macular degeneration-right   ? Left atrial appendage thrombus       Physical Examination Review of Systems   Vitals:    05/29/18 1255   BP: 118/66   Pulse: 80   Resp: 16     Body mass index is 25.4 kg/(m^2).  Wt  Readings from Last 3 Encounters:   05/29/18 172 lb (78 kg)   05/16/18 172 lb (78 kg)   04/23/18 175 lb (79.4 kg)     General Appearance:   Alert, well-appearing and in no acute distress.   HEENT: Atraumatic, normocephalic.  No scleral icterus, normal conjunctivae; mucous membranes pink and moist.     Chest: Chest symmetric, spine straight.   Lungs:   Respirations unlabored: Lungs are clear to auscultation.   Cardiovascular:   Normal first and second heart sounds with no murmurs, rubs, or gallops.  Irregular, irregular.  Radial and posterior tibial pulses are intact.  Normal JVD, no edema.       Extremities: No cyanosis or clubbing   Musculoskeletal: Moves all extremities   Skin: Warm, dry, intact.    Neurologic: Mood and affect are appropriate, alert and oriented to person, place, time, and situation    General: WNL  Eyes: WNL  Ears/Nose/Throat: WNL  Lungs: WNL  Heart: WNL  Stomach: WNL  Bladder: WNL  Muscle/Joints: WNL  Skin: WNL  Nervous System: WNL  Mental Health: WNL     Blood: WNL       Medical History  Surgical History Family History Social History   Past Medical History:   Diagnosis Date   ? Aortic valve insufficiency 7/11/2015    Mild-moderate AI echo July 2015    ? Arthritis 2010    hands   ? Atrial flutter 07/10/2015   ? Coronary artery disease 2014   ? Essential hypertension 2014   ? Hematuria 07/sep/2007    unknown per pt   ? Hyperlipidemia 4/13/2018   ? Left atrial thrombus 5/3/2016    MELONY 3/9/2016    ? Macular degeneration 2015   ? Persistent atrial fibrillation 7/24/2015   ? Stroke 03/04/2014   ? Thrombus of right atrial appendage     per H&P    Past Surgical History:   Procedure Laterality Date   ? CATARACT EXTRACTION Bilateral    ? EYE SURGERY      cataract surgery   ? HERNIA REPAIR  2015   ? HERNIA REPAIR     ? KNEE SURGERY Left    ? NEUROPLASTY / TRANSPOSITION MEDIAN NERVE AT CARPAL TUNNEL BILATERAL     ? ROTATOR CUFF REPAIR Left     one   ? ROTATOR CUFF REPAIR Right     two times   ? SHOULDER  SURGERY      Family History   Problem Relation Age of Onset   ? Cancer Father    ? No Medical Problems Mother    ? Cancer Sister    ? No Medical Problems Brother     Social History     Social History   ? Marital status:      Spouse name: N/A   ? Number of children: N/A   ? 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 Smoking status: Never Smoker   ? Smokeless tobacco: Never Used   ? Alcohol use No   ? Drug use: No   ? Sexual activity: Not on file     Other Topics Concern   ? Not on file     Social History Narrative    Lives with family.           Medications  Allergies   Current Outpatient Prescriptions   Medication Sig Dispense Refill   ? apixaban (ELIQUIS) 5 mg Tab tablet Take 1 tablet (5 mg total) by mouth 2 (two) times a day. 60 tablet 11   ? clopidogrel (PLAVIX) 75 mg tablet Take 1 tablet (75 mg total) by mouth daily. 90 tablet 0   ? coenzyme Q10 (CO Q-10) 100 mg capsule Take 100 mg by mouth daily.     ? fluticasone (FLONASE) 50 mcg/actuation nasal spray 1 spray into each nostril daily as needed for rhinitis.      ? LACTOBACILLUS ACIDOPHILUS (PROBIOTIC ORAL) Take 1 capsule by mouth daily.     ? levOCARNitine (L-CARNITINE) 500 mg Tab Take 1 tablet by mouth daily.     ? metoprolol tartrate (LOPRESSOR) 25 MG tablet Take 25 mg by mouth 2 (two) times a day.     ? RED YEAST RICE ORAL Take 600 mg by mouth daily.      ? SAW PALMETTO ORAL Take by mouth daily.       No current facility-administered medications for this visit.       Allergies   Allergen Reactions   ? Pollen Itching     Runny nose, sneezing, itchy eyes    ? Dust [Other Environmental Allergy] Itching     Running nose. Itchy eye   ? Ragweed Itching     Running nose, itchy eyes   ? Yeast, Dried Headache     brewrys yeast only      Medical, surgical, family, social history, and medications were all reviewed and updated as necessary.   Lab Results    Chemistry CBC/INR CHOLESTROL   Lab  Results   Component Value Date    CREATININE 1.03 05/14/2018    BUN 22 05/14/2018     05/14/2018    K 4.7 05/14/2018     05/14/2018    CO2 28 05/14/2018     Creatinine (mg/dL)   Date Value   05/14/2018 1.03   11/20/2017 1.29   06/15/2017 1.35 (H)   05/31/2016 0.97     Lab Results   Component Value Date     (H) 07/09/2015    Lab Results   Component Value Date    WBC 6.1 11/20/2017    HGB 15.3 11/20/2017    HCT 45.8 11/20/2017    MCV 90 11/20/2017     11/20/2017     Lab Results   Component Value Date    INR 1.63 (H) 11/20/2017      Lab Results   Component Value Date    CHOL 221 (H) 10/26/2017    HDL 41 10/26/2017    LDLCALC 160 (H) 10/26/2017    TRIG 99 10/26/2017          Greater than than 25 minutes were spent face to face in this visit discussing diagnoses as listed above, counseling, and coordination of care.    This note has been dictated using voice recognition software. Any grammatical, typographical, or context distortions are unintentional and inherent to the software.    Nelli Roland RN,  [facility]   Electrophysiology  [phone]

## 2021-06-28 NOTE — PROGRESS NOTES
Progress Notes by Veronica Kong at 10/7/2019 10:48 AM     Author: Veronica Kong Service: -- Author Type: Research Associate    Filed: 10/7/2019 10:48 AM Encounter Date: 10/7/2019 Status: Signed    : Veronica Kong (Research Associate)         Zestar Study Device Return    Son Huizar returned all the devices for the Zestar study.  Son Huizar denies medication changes or adverse events since last visit.    Son BUNDY Bhupendra has now completed their participation in the Zestar study.   Thank you for your gift of participation.    Veronica Kong

## 2021-06-28 NOTE — PROGRESS NOTES
Progress Notes by Lourdes hWiteside at 10/3/2019 12:30 PM     Author: Lourdes Whiteside Service: -- Author Type: Patient Access    Filed: 10/24/2019 10:29 AM Encounter Date: 10/3/2019 Status: Signed    : Lourdes Whiteside (Patient Access)             Zestar Study Consent Visit    Study description: ECG and PPG Study: Zestar Study      Note time seated: 12:57 pm    Son Huizar a 84 y.o. male , was seen in [facility] today to discuss participation in the Zestar study.   The consent discussion began on 1 Oct 2019.  Please refer to phone call note from Sol Marlow for more details.  The consent form was reviewed with the patient.     The review of the study included:    Study purpose     Conflict of interest    Device description      Study visits    Risks of participation    Benefits (if any)    Alternatives    Voluntary participation    Confidentiality     Compensation/costs of participation    Study stipends    Injury and legal rights    The subject was provided time to review the consent form and consider participation. his questions were answered to his satisfaction.   The patient has voluntarily agreed to participate in the above noted study.     The consent form version 6 Aug 2019 and HIPAA form version 11 Jun 2019 was signed 10/03/19 at 1:28 pm    The subject was provided with a copy of the consent form and HIPPA. A copy of the signed forms was forwarded to medical records.    No study procedures were done prior to Son Huizar providing informed consent.     Lourdes Whiteside    Subject Restrictions During Study -Confirmed with Subject prior to any study procedures completed    Restrictions on jewelry, recreational drugs, caffeine, and exercise few days prior and during study.   1. Subjects should not consume excessive amount of caffeine (6 or more 8-oz cups of coffee, or more than 570 mg of caffeine from energy drinks, pills or similar substance) during their participation in the study.   2. Subjects should not  "consume excessive amount of alcohol for the duration of their participation in the study. A typical moderate amount is allowed during stage 3.   3. Subjects should not take any recreational drugs (including, but not limited to methamphetamines, cocaine, opioids, cannabis, LSD) for the duration of their participation in the study.   4. Subjects should not wear underwire bra or jewelry during the in-lab study (to not interfere with electrode placement and ECG data recordings).   5. Subject will not be permitted to have their cell phone or any electronic recording device on or with them during the in-lab test session(s).   6. Subjects under 22 years old will not be permitted to take ECG recordings through the ECG iain on the wrist-worn devices.     For study stage 3 only   1. Subjects should only do high intensity exercise (e.g. sprinting, heavy lifting, etc.) in the morning upon awakening or else not at all   2. Subjects should abstain from swimming during the time of the study   3. Subjects should only shower in the morning upon awakening (or else not at all)   4. Female subjects are strongly suggested to wear non-underwire bras throughout this stage of the study     Lourdescher Whiteside      Study Data collections   Vitals  (TPBP)     Vitals:    10/03/19 1335 10/03/19 1337 10/03/19 1338   BP: 133/71 117/74 117/76   Patient Site: Left Arm Left Arm Left Arm   Patient Position: Sitting Sitting Sitting   Cuff Size: Adult Regular Adult Regular Adult Regular   Pulse: 77 82 76   Resp: 20     Temp: 97.5  F (36.4  C)     Weight:   159 lb 6.4 oz (72.3 kg)   Height:   5' 8\" (1.727 m)      VS taken after 5 min rest     MAP 1    90  MAP 2    82     MAP 3    92                   Body mass index is 24.24 kg/m .  male  1935  84 y.o.      Note time patient placed in supine position: 1:43 pm    Ethnicity   []   or    [x]  Not  or   Race   []   or    []    []  Black or "   []   or Other   [x]  White  Physical Activity Level  per subject report:   []  0- Extremely Inactive []  1- Sedentary [x]  2- Moderately Active  []  3- Vigorously Active []  4- Extremely Active  Trained Athlete   [] Yes  [x] No     [place]'s' Skin type   [] Type 1 [] Type 2 [x] Type 3    [] Type 4 [] Type 5 [] Type 6    Subject participated in previous ECG study at [facility]: [] Yes    [x] No    Past Medical History:   Diagnosis Date   ? Aortic valve insufficiency 7/11/2015    Mild-moderate AI echo July 2015    ? Arthritis 2010    hands   ? Atrial fibrillation (H) 2015   ? Atrial flutter (H) 07/10/2015   ? Essential hypertension 2014   ? Hematuria 07/sep/2007    unknown per pt   ? Hyperlipidemia 4/13/2018   ? Left atrial thrombus 5/3/2016    MELONY 3/9/2016    ? Macular degeneration 2015   ? Persistent atrial fibrillation 7/24/2015   ? Stroke (H) 03/04/2014    acute L Thalmic stroke   ? Thrombus of right atrial appendage     per H&P       HISTORY OF HEART RHYTHM ABNORMALITIES   []  None   []  Atrial Flutter  [] Frequent PACs (>3 in 30 secs)  [x] AF (permanent)  []  Tachycardia  [] Bigeminy, trigeminy, and/or quadgeminy  []  AF (persistent)  []  Heart Block   []  AF (paroxysmal)  [] PVCs    [] AF (other)    [] BBB    []  Other:   How many years? 3  Special interest allergies: active allergic skin reactions  Allergies   Allergen Reactions   ? Pollen Itching     Runny nose, sneezing, itchy eyes    ? Dust [Other Environmental Allergy] Itching     Running nose. Itchy eye   ? Ragweed Itching     Running nose, itchy eyes   ? Yeast, Dried Headache     brewrys yeast only       Current Outpatient Medications:   ?  alpha lipoic acid 100 mg cap, Take 100 mg by mouth daily., Disp: , Rfl:   ?  ascorbic acid, vitamin C, (ASCORBIC ACID WITH NATHALIE HIPS) 500 MG tablet, Take 500 mg by mouth daily., Disp: , Rfl:   ?  b complex vitamins tablet, Take 1 tablet by mouth daily. B Complex 50  Tablet (B Complex Vitamins) Give 1 tablet by mouth one time a day for Supplement    , Disp: , Rfl:   ?  B.ani/L.aci/L.sal/L.plan/L.Lamont (PROBIOTIC FORMULA ORAL), Take 1 capsule by mouth daily., Disp: , Rfl:   ?  cholecalciferol, vitamin D3, 1,000 unit tablet, Take 2,000 Units by mouth daily., Disp: , Rfl:   ?  clopidogrel (PLAVIX) 75 mg tablet, TAKE 1 TABLET (75 MG TOTAL) BY MOUTH DAILY., Disp: 90 tablet, Rfl: 3  ?  coenzyme Q10 (CO Q-10) 100 mg capsule, Take 100 mg by mouth daily., Disp: , Rfl:   ?  ELIQUIS 5 mg Tab tablet, TAKE 1 TABLET BY MOUTH TWICE DAILY, Disp: 60 tablet, Rfl: 4  ?  fluticasone (FLONASE) 50 mcg/actuation nasal spray, Apply 1 spray into each nostril daily as needed.    , Disp: , Rfl:   ?  levOCARNitine 500 mg Tab, Take 1 tablet by mouth daily as needed., Disp: , Rfl:   ?  magnesium 200 mg Tab, Take 200 mg by mouth daily., Disp: , Rfl:   ?  metoprolol tartrate (LOPRESSOR) 25 MG tablet, Take 25 mg by mouth daily.    , Disp: , Rfl:   ?  multivitamin with minerals tablet, Take 1 tablet by mouth daily., Disp: , Rfl:   ?  omega-3/dha/epa/fish oil (FISH OIL-OMEGA-3 FATTY ACIDS) 300-1,000 mg capsule, Take 1 g by mouth daily., Disp: , Rfl:   ?  peg 400-propylene glycol PF (SYSTANE) 0.4-0.3 % Dpet, Administer 1 drop to both eyes 4 (four) times a day as needed., Disp: , Rfl:   ?  SAW PALMETTO ORAL, Take by mouth daily. Saw Palmetto (Serenoa repens) Capsule 320 MG Give 640 mg by mouth one time a day for Supplement, Disp: , Rfl:   ?  simvastatin (ZOCOR) 40 MG tablet, TAKE 1 TABLET BY MOUTH AT BEDTIME, Disp: 90 tablet, Rfl: 0  ?  traMADol (ULTRAM) 50 mg tablet, Take 1 tablet (50 mg total) by mouth every 8 (eight) hours as needed for pain., Disp: 10 tablet, Rfl: 0  ?  vitamin A-vitamin C-vit E-min (OCUVITE) Tab tablet, Take 1 tablet by mouth daily., Disp: , Rfl:   ?  zinc gluconate 30 mg Tab, Take 30 mg by mouth daily., Disp: , Rfl:       10-sec 12-lead ECG & 30-sec 12-lead ECG rhythm strip done; reviewed by &  "PE done by Cheryl Hwang  Subject Questionnaire    OCCUPATION: retired   Predominately works outdoors  [] Yes    [x] No      Hours/week spent outdoors (total, not only for work): 14  Frequently participates in \"hand intensive\" activities [] Yes [x] No  Caffeine  []  Never  [] Occasionally        [x]  Daily (1 cup/day)     []  Daily (>1 cup/day)    Alcohol   [x]  Never  [] Light (drink or 2 occasionally) []  Moderate (a drink or 2 almost daily)   []  Occasional-heavy (more than a few drinks <2x / month)  [] Heavy (more than a few drinks >2x / month)    Tobacco/nicotine  [x]  Never  [] Rarely  []  Frequently/ Daily     Mattress Information  [] Subject did not participate in Stage 3  Mattress type:  []  Memory foam [] Gel  [] Innerspring (coil)  [] Airbed  [] Waterbed [] Shikibuton  [] Hybrid  [x] No mattress  [x] Other (comment): recliner   Mattress foundation   [] Mattress on floor/ground    [] Mattress on foundation/box spring on floor/ground  [] Mattress on foundation/box spring on bed frame  [] Mattress on tatami on floor/ground  [x] Other (comment): sleeps in recliner    Mattress topper   [x] No mattress topper  [] Pillow top  [] Foam top - flat style  [] Foam top - \"egg crate\" style  [] Other (comment):    Co-sleeper    []  Yes  [x]  No    CPAP use   [] Yes  [x] No      Dominant hand [] left  [x] right [] ambidextrous  Preferred Wrist to wear band on   [x]  left   []  right   Were screening day & study day: [x]  same [] different   Same: wrist circumference:      165  mm  Device wearing wrist skin fold thickness:       4.4  mm  Wrist Band Size:     []  Flush Fit S/M  []  Non-Flush Fit S/M   [x]  Flush Fit M/L  []  Non-Flush Fit M/L  []  Flush Fit XL  []  Non-Flush Fit XL    Preferred/natural band notch: 3  Secure band notch: 3  Crown orientation:  []  left   [x]  right  Device wearing wrist hairiness:     []  Light [x]  Medium       []  Heavy  Spectophotometer    HARRIET TOWNSEND   Reading #1  59.97  8.75  18.25 "   Reading #2  5.75  8.24  18.12   Reading #3  58.80  7.82  18.10     Pregnancy test  for WOCBP    [x] n/a male or female not child bearing potential  Room Temperature ( C): 22  CS Laptop ID: [id number]  CS Cam ID:[id number]  Device Set ID: [id number]  Wrist Device ID: [id number]  Subject has now completed their in-house participation in the Zestar study. Subject will complete Stage 3 at home for the next 3 days and return the equipment on 7 Oct 2019.  Lourdes Whiteside

## 2021-06-28 NOTE — PROGRESS NOTES
Progress Notes by Cheryl Hwang CNP at 10/3/2019 12:30 PM     Author: Cheryl Hwang CNP Service: -- Author Type: Nurse Practitioner    Filed: 10/24/2019 10:29 AM Encounter Date: 10/3/2019 Status: Signed    : Cheryl Hwang CNP (Nurse Practitioner)        Zestar Study    Physical Examination  For abnormal findings, please evaluate if the finding is Clinically Significant (by 'CS') or Not Clinically Significant (by 'NCS')  General Appearance Normal  Head and Neck  Normal  Lungs    Normal  Cardiovascular  Abnormal- NCS irregular heart beat   Abdomen   Normal  Musculoskeletal/Extremities Normal   Lymph Nodes  Normal  Skin    Normal  Neurological   Normal   Tremor absent     If present, evaluate severity on 1-10 scale    Cheryl Hwang CNP

## 2021-07-02 NOTE — H&P
H&P by Miguelina Ayala Chi, PA-C at 2020 10:00 AM     Author: Miguelina Ayala Chi, PA-C Service: Interventional Radiology Author Type: Physician Assistant    Filed: 2020 10:00 AM Date of Service: 2020 10:00 AM Status: Addendum    : Miguelina Ayala Chi, PA-C (Physician Assistant)    Related Notes: Original Note by Miguelina Ayala Chi, PA-C (Physician Assistant) filed at 2020  9:58 AM         Interventional Radiology - History and Physical  2020    Procedure Requested: Right Port Removal  Requesting Provider: Suzan Bowling MD    HPI: Son Huizar is a 85 y.o. old male with history of stroke, chronic afib, left atrial appendage thrombus on Eliquis, diffuse large B cell Lymphoma s/p IR RIJ port placement on 2020 requested for port removal after completion of chemotherapy a few weeks ago.      Denies any fevers, chills, port tenderness or malfunction, chest pain, shortness of breath, dry cough, rashes.      Reports had COVID test 2 days ago but never received results.      Imagin2020:  RIJ Port placement:  PROCEDURE:    The Central Venous Catheter Insertion checklist was reviewed prior to placement and followed throughout the procedure. . Using real-time ultrasound guidance the right internal jugular vein was accessed. Access was secured with a microwire and   transitional sheath. After measuring the intravascular portion of the microwire, it was exchanged for a Bentson wire which was placed in the IVC.     A subcutaneous pocket was created and irrigated with sterile normal saline. The port was placed within the subcutaneous pocket and secured using 2-0 Prolene retention sutures. The catheter was tunneled from the pocket for the neck and then trimmed to   length. A peel-away sheath was placed over the Bentson wire and the catheter was advanced through the sheath. Sheath was peeled away. Subcutaneous catheter redundancy was removed by gentle manual manipulation. The port was  accessed and aspirated well. It   was locked with heparin. The port pocket incision was closed with layered absorbable suture and surgical glue. The dermatotomy site was closed with surgical glue.      FINDINGS:  Ultrasound demonstrates an anechoic and compressible jugular vein. A permanent image was stored.     At the completion of the study the port tip lies near the cavoatrial junction.     IMPRESSION:   1.  Successful implantable venous chest port placement as detailed above. This port is power injectable.  2.  The implantable venous chest port was placed under fluoroscopic guidance and is ready for use immediately.    NPO Status: took meds with a small square of maribeth carey at 5AM today  Anticoagulation/Antiplatelets/Bleeding tendencies: Eliquis last dose yesterday.    Antibiotics: none needed for procedure     Review of Systems: A comprehensive 10-point review of systems was performed. All systems were reviewed and negative with exception to those reported in the HPI.    PMH:  Past Medical History:   Diagnosis Date   ? Aortic valve insufficiency 7/11/2015    Mild-moderate AI echo July 2015    ? Arthritis 2010    hands   ? Atrial fibrillation (H) 2015   ? Atrial flutter (H) 07/10/2015   ? Cancer (H)    ? Essential hypertension 2014   ? Hematuria 07/sep/2007    unknown per pt   ? Hyperlipidemia 4/13/2018   ? Left atrial thrombus 5/3/2016    MELONY 3/9/2016    ? Lt. Thalamic infarction (H) 9/14/2018   ? Lymphoma (H)    ? Macular degeneration 2015   ? Persistent atrial fibrillation (H) 7/24/2015   ? Stroke (H) 03/04/2014    acute L Thalmic stroke   ? Thrombus of right atrial appendage     per H&P       PSH:  Past Surgical History:   Procedure Laterality Date   ? CATARACT EXTRACTION Bilateral 2015   ? HERNIA REPAIR  2015   ? IR PORT PLACEMENT >5 YEARS  1/14/2020   ? KNEE SURGERY Left 1980   ? NEUROPLASTY / TRANSPOSITION MEDIAN NERVE AT CARPAL TUNNEL BILATERAL Bilateral 2007   ? ROTATOR CUFF REPAIR Left 2009    one    ? ROTATOR CUFF REPAIR Right 2008    two times   ? US THORACENTESIS  12/6/2019       ALLERGIES  Pollen; Dust [other environmental allergy]; Ragweed; and Yeast, dried    MEDICATIONS:  Current Outpatient Medications on File Prior to Encounter   Medication Sig Dispense Refill   ? alpha lipoic acid 100 mg cap Take 100 mg by mouth daily.     ? ascorbic acid, vitamin C, (ASCORBIC ACID WITH NATHALIE HIPS) 500 MG tablet Take 500 mg by mouth daily.     ? b complex vitamins tablet Take 1 tablet by mouth daily. B Complex 50 Tablet (B Complex Vitamins) Give 1  tablet by mouth one time a day for Supplement            ? B.ani/L.aci/L.sal/L.plan/L.Lamont (PROBIOTIC FORMULA ORAL) Take 1 capsule by mouth daily.     ? cholecalciferol, vitamin D3, 1,000 unit tablet Take 2,000 Units by mouth daily.     ? ELIQUIS 5 mg Tab tablet TAKE 1 TABLET BY MOUTH TWICE DAILY 60 tablet 4   ? fluticasone (FLONASE) 50 mcg/actuation nasal spray Apply 1 spray into each nostril daily as needed.            ? levOCARNitine 500 mg Tab Take 1 tablet by mouth daily as needed.     ? magnesium 200 mg Tab Take 200 mg by mouth daily.     ? metoprolol tartrate (LOPRESSOR) 25 MG tablet Take 25 mg by mouth daily.            ? multivitamin with minerals tablet Take 1 tablet by mouth daily.     ? omega-3/dha/epa/fish oil (FISH OIL-OMEGA-3 FATTY ACIDS) 300-1,000 mg capsule Take 1 g by mouth daily.     ? peg 400-propylene glycol PF (SYSTANE) 0.4-0.3 % Dpet Administer 1 drop to both eyes 4 (four) times a day as needed.     ? SAW PALMETTO ORAL Take by mouth daily. Saw Palmetto (Serenoa repens) Capsule 320 MG  Give 640 mg by mouth one time a day for Supplement     ? simvastatin (ZOCOR) 40 MG tablet TAKE 1 TABLET BY MOUTH AT BEDTIME 90 tablet 0   ? traMADol (ULTRAM) 50 mg tablet Take 1 tablet (50 mg total) by mouth every 8 (eight) hours as needed for pain. 10 tablet 0   ? vitamin A-vitamin C-vit E-min (OCUVITE) Tab tablet Take 1 tablet by mouth daily.     ? zinc gluconate 30 mg  "Tab Take 30 mg by mouth daily.     ? coenzyme Q10 (CO Q-10) 100 mg capsule Take 100 mg by mouth daily.     ? [DISCONTINUED] clopidogrel (PLAVIX) 75 mg tablet TAKE 1 TABLET (75 MG TOTAL) BY MOUTH DAILY. 90 tablet 3     No current facility-administered medications on file prior to encounter.        LABS:  POC INR (no units)   Date Value   06/20/2016 4.00 (!)     INR (no units)   Date Value   12/06/2019 1.31 (H)     Hemoglobin (g/dL)   Date Value   09/06/2019 13.1 (L)     Platelets (thou/uL)   Date Value   12/06/2019 327     Creatinine (mg/dL)   Date Value   12/02/2019 1.30       COVID-19 PCR Results    COVID-19 PCR Results 6/17/20 2019-nCOV Not Detected      Comments are available for some flowsheets but are not being displayed.             EXAM:  /71   Pulse 73   Temp 98.2  F (36.8  C)   Resp 18   Ht 5' 9\" (1.753 m)   Wt 153 lb (69.4 kg)   BMI 22.59 kg/m    General: Stable. In no acute distress  Neuro: A&O x3.  Moves all extremities equally.  Resp: Lungs CTA bilaterally.  Cardio: S1S2 and reg, without murmur, clicks or rubs.  Abdomen: Soft, non-distended and non-tender.  Skin:  Without excoriations, ecchymosis, erythema, lesions or open sores on right port/neck/chest wall.  There are multiple bluish capillaries over the port. Port is non tender to palpation and non tender to palpation all along port tract.      Pre-Sedation Assessment:  IV fentanyl as pt had small maribeth cracker with meds at 5AM.  Pt is amenable to IV fentanyl    ASSESSMENT:  Diffuse Large B Cell Lymphoma, completed chemotherapy    PLAN:    Right Port removal with local and IV fentanyl.      The procedure, risks and moderate sedation were discussed with patient, all questions answered and patient agrees to proceed with the procedure.  Written consent obtained.      [facility]  Interventional Radiology  [phone]         "

## 2021-07-02 NOTE — H&P
"H&P by Pema Carreon PA-C at 1/14/2020  9:30 AM     Author: Pema Carreon PA-C Service: Interventional Radiology Author Type: Physician Assistant    Filed: 1/14/2020  9:34 AM Date of Service: 1/14/2020  9:30 AM Status: Signed    : Pema Carreon PA-C (Physician Assistant)         Interventional Radiology - Pre-Procedure Note:  1/14/2020    Procedure Requested: port placement  Requested by: Suzan Bowling MD    History and Physical Reviewed: H&P documented within 30 days (by Suzan Bowling MD on 1/7/2020).  I have personally reviewed the patient's medical history and have updated the medical record as necessary.    Brief HPI: Son Huizar is a 84 y.o. old male with diffuse large B cell lymphoma. Here for port placement for anticipated chemotherapy.    Discussed right sided port placement.    NPO: midnight.  ANTICOAGULANTS: plavix. Eliquis  ANTIBIOTICS: ancef ordered for procedure.    ALLERGIES  Pollen; Dust [other environmental allergy]; Ragweed; and Yeast, dried    LABS:  POC INR (no units)   Date Value   06/20/2016 4.00 (!)     INR (no units)   Date Value   12/06/2019 1.31 (H)     Hemoglobin (g/dL)   Date Value   09/06/2019 13.1 (L)     Platelets (thou/uL)   Date Value   12/06/2019 327     Creatinine (mg/dL)   Date Value   12/02/2019 1.30     Potassium (mmol/L)   Date Value   12/02/2019 5.1 (H)       EXAM:  /69   Pulse 78   Temp 98.4  F (36.9  C) (Oral)   Resp 16   Ht 5' 8\" (1.727 m)   Wt 156 lb (70.8 kg)   SpO2 97%   BMI 23.72 kg/m    General: Stable. In no acute distress.  Neuro: A&O x 3. Moves all extremities equally.  Resp: Lungs clear to auscultation bilaterally.  Skin: Without excoriations, ecchymosis, erythema, lesions or open sores on upper chest and neck.      Pre-Sedation Assessment:  Mallampati Airway Classification: Class 2: upper half of tonsil fossa visible  Previous reaction to anesthesia/sedation: no  Sedation plan based on assessment: " Moderate  ASA Classification: ASA 3 - Patient with moderate systemic disease with functional limitations      ASSESSMENT/PLAN:   Lymphoma    Port placement with sedation.    Procedure, risk/benefits, and sedation reviewed with pt/family. All questions answered. Consent obtained. OK to proceed with above radiology procedure.     Pema Carreon  Interventional Radiology

## 2021-07-14 PROBLEM — I63.81 THALAMIC INFARCTION (H): Status: RESOLVED | Noted: 2018-09-14 | Resolved: 2019-10-03

## 2021-08-04 DIAGNOSIS — Z11.59 ENCOUNTER FOR SCREENING FOR OTHER VIRAL DISEASES: ICD-10-CM

## 2021-08-10 ENCOUNTER — LAB REQUISITION (OUTPATIENT)
Dept: LAB | Facility: CLINIC | Age: 86
End: 2021-08-10
Payer: COMMERCIAL

## 2021-08-10 DIAGNOSIS — I10 ESSENTIAL (PRIMARY) HYPERTENSION: ICD-10-CM

## 2021-08-10 DIAGNOSIS — Z01.812 ENCOUNTER FOR PREPROCEDURAL LABORATORY EXAMINATION: ICD-10-CM

## 2021-08-10 LAB
ANION GAP SERPL CALCULATED.3IONS-SCNC: 11 MMOL/L (ref 5–18)
BUN SERPL-MCNC: 22 MG/DL (ref 8–28)
CALCIUM SERPL-MCNC: 9.9 MG/DL (ref 8.5–10.5)
CHLORIDE BLD-SCNC: 103 MMOL/L (ref 98–107)
CO2 SERPL-SCNC: 29 MMOL/L (ref 22–31)
CREAT SERPL-MCNC: 1.08 MG/DL (ref 0.7–1.3)
GFR SERPL CREATININE-BSD FRML MDRD: 63 ML/MIN/1.73M2
GLUCOSE BLD-MCNC: 117 MG/DL (ref 70–125)
POTASSIUM BLD-SCNC: 4.5 MMOL/L (ref 3.5–5)
SODIUM SERPL-SCNC: 143 MMOL/L (ref 136–145)

## 2021-08-10 PROCEDURE — U0005 INFEC AGEN DETEC AMPLI PROBE: HCPCS | Mod: ORL | Performed by: PHYSICIAN ASSISTANT

## 2021-08-10 PROCEDURE — 80048 BASIC METABOLIC PNL TOTAL CA: CPT | Performed by: PHYSICIAN ASSISTANT

## 2021-08-11 LAB — SARS-COV-2 RNA RESP QL NAA+PROBE: NEGATIVE

## 2021-08-12 ENCOUNTER — ANESTHESIA EVENT (OUTPATIENT)
Dept: SURGERY | Facility: CLINIC | Age: 86
End: 2021-08-12
Payer: COMMERCIAL

## 2021-08-13 ENCOUNTER — HOSPITAL ENCOUNTER (OUTPATIENT)
Facility: CLINIC | Age: 86
Discharge: HOME | End: 2021-08-13
Attending: SPECIALIST | Admitting: SPECIALIST
Payer: COMMERCIAL

## 2021-08-13 ENCOUNTER — ANESTHESIA (OUTPATIENT)
Dept: SURGERY | Facility: CLINIC | Age: 86
End: 2021-08-13
Payer: COMMERCIAL

## 2021-08-13 VITALS
HEIGHT: 69 IN | DIASTOLIC BLOOD PRESSURE: 75 MMHG | WEIGHT: 162.3 LBS | BODY MASS INDEX: 24.04 KG/M2 | HEART RATE: 72 BPM | RESPIRATION RATE: 20 BRPM | SYSTOLIC BLOOD PRESSURE: 134 MMHG | OXYGEN SATURATION: 97 % | TEMPERATURE: 98.2 F

## 2021-08-13 DIAGNOSIS — K40.30 INCARCERATED RIGHT INGUINAL HERNIA: Primary | ICD-10-CM

## 2021-08-13 PROCEDURE — 250N000011 HC RX IP 250 OP 636: Performed by: SPECIALIST

## 2021-08-13 PROCEDURE — 258N000003 HC RX IP 258 OP 636: Performed by: NURSE ANESTHETIST, CERTIFIED REGISTERED

## 2021-08-13 PROCEDURE — 250N000009 HC RX 250: Performed by: ANESTHESIOLOGY

## 2021-08-13 PROCEDURE — C1781 MESH (IMPLANTABLE): HCPCS | Performed by: SPECIALIST

## 2021-08-13 PROCEDURE — 360N000075 HC SURGERY LEVEL 2, PER MIN: Performed by: SPECIALIST

## 2021-08-13 PROCEDURE — 250N000025 HC SEVOFLURANE, PER MIN: Performed by: SPECIALIST

## 2021-08-13 PROCEDURE — 272N000001 HC OR GENERAL SUPPLY STERILE: Performed by: SPECIALIST

## 2021-08-13 PROCEDURE — 370N000017 HC ANESTHESIA TECHNICAL FEE, PER MIN: Performed by: SPECIALIST

## 2021-08-13 PROCEDURE — 258N000003 HC RX IP 258 OP 636: Performed by: ANESTHESIOLOGY

## 2021-08-13 PROCEDURE — 250N000009 HC RX 250: Performed by: NURSE ANESTHETIST, CERTIFIED REGISTERED

## 2021-08-13 PROCEDURE — 710N000012 HC RECOVERY PHASE 2, PER MINUTE: Performed by: SPECIALIST

## 2021-08-13 PROCEDURE — 999N000141 HC STATISTIC PRE-PROCEDURE NURSING ASSESSMENT: Performed by: SPECIALIST

## 2021-08-13 PROCEDURE — 250N000009 HC RX 250: Performed by: SPECIALIST

## 2021-08-13 PROCEDURE — 710N000010 HC RECOVERY PHASE 1, LEVEL 2, PER MIN: Performed by: SPECIALIST

## 2021-08-13 PROCEDURE — 250N000011 HC RX IP 250 OP 636: Performed by: NURSE ANESTHETIST, CERTIFIED REGISTERED

## 2021-08-13 DEVICE — BARD MESH, 2" X 4" (5 CM X 10 CM)
Type: IMPLANTABLE DEVICE | Site: GROIN | Status: FUNCTIONAL
Brand: BARD

## 2021-08-13 RX ORDER — ERYTHROMYCIN 5 MG/G
0.5 OINTMENT OPHTHALMIC AT BEDTIME
COMMUNITY
Start: 2021-08-02 | End: 2023-01-01

## 2021-08-13 RX ORDER — SODIUM CHLORIDE, SODIUM LACTATE, POTASSIUM CHLORIDE, CALCIUM CHLORIDE 600; 310; 30; 20 MG/100ML; MG/100ML; MG/100ML; MG/100ML
INJECTION, SOLUTION INTRAVENOUS CONTINUOUS
Status: DISCONTINUED | OUTPATIENT
Start: 2021-08-13 | End: 2021-08-13 | Stop reason: HOSPADM

## 2021-08-13 RX ORDER — CEFAZOLIN SODIUM 2 G/100ML
2 INJECTION, SOLUTION INTRAVENOUS
Status: COMPLETED | OUTPATIENT
Start: 2021-08-13 | End: 2021-08-13

## 2021-08-13 RX ORDER — CEFAZOLIN SODIUM 2 G/100ML
2 INJECTION, SOLUTION INTRAVENOUS SEE ADMIN INSTRUCTIONS
Status: DISCONTINUED | OUTPATIENT
Start: 2021-08-13 | End: 2021-08-13 | Stop reason: HOSPADM

## 2021-08-13 RX ORDER — FENTANYL CITRATE 50 UG/ML
25 INJECTION, SOLUTION INTRAMUSCULAR; INTRAVENOUS EVERY 5 MIN PRN
Status: DISCONTINUED | OUTPATIENT
Start: 2021-08-13 | End: 2021-08-13 | Stop reason: HOSPADM

## 2021-08-13 RX ORDER — HALOPERIDOL 5 MG/ML
1 INJECTION INTRAMUSCULAR
Status: DISCONTINUED | OUTPATIENT
Start: 2021-08-13 | End: 2021-08-13 | Stop reason: HOSPADM

## 2021-08-13 RX ORDER — LIDOCAINE 40 MG/G
CREAM TOPICAL
Status: DISCONTINUED | OUTPATIENT
Start: 2021-08-13 | End: 2021-08-13 | Stop reason: HOSPADM

## 2021-08-13 RX ORDER — BUPIVACAINE HYDROCHLORIDE 2.5 MG/ML
INJECTION, SOLUTION INFILTRATION; PERINEURAL PRN
Status: DISCONTINUED | OUTPATIENT
Start: 2021-08-13 | End: 2021-08-13 | Stop reason: HOSPADM

## 2021-08-13 RX ORDER — FENTANYL CITRATE 50 UG/ML
INJECTION, SOLUTION INTRAMUSCULAR; INTRAVENOUS PRN
Status: DISCONTINUED | OUTPATIENT
Start: 2021-08-13 | End: 2021-08-13

## 2021-08-13 RX ORDER — DEXAMETHASONE SODIUM PHOSPHATE 10 MG/ML
INJECTION, SOLUTION INTRAMUSCULAR; INTRAVENOUS PRN
Status: DISCONTINUED | OUTPATIENT
Start: 2021-08-13 | End: 2021-08-13

## 2021-08-13 RX ORDER — OXYCODONE HYDROCHLORIDE 5 MG/1
5 TABLET ORAL EVERY 4 HOURS PRN
Status: DISCONTINUED | OUTPATIENT
Start: 2021-08-13 | End: 2021-08-13 | Stop reason: HOSPADM

## 2021-08-13 RX ORDER — OXYCODONE AND ACETAMINOPHEN 5; 325 MG/1; MG/1
1 TABLET ORAL EVERY 6 HOURS PRN
Qty: 14 TABLET | Refills: 0 | Status: SHIPPED | OUTPATIENT
Start: 2021-08-13 | End: 2021-08-16

## 2021-08-13 RX ORDER — LACTOBACILLUS RHAMNOSUS GG 10B CELL
1 CAPSULE ORAL DAILY
Status: ON HOLD | COMMUNITY
End: 2023-01-01

## 2021-08-13 RX ORDER — MAGNESIUM HYDROXIDE 1200 MG/15ML
LIQUID ORAL PRN
Status: DISCONTINUED | OUTPATIENT
Start: 2021-08-13 | End: 2021-08-13 | Stop reason: HOSPADM

## 2021-08-13 RX ORDER — CLOPIDOGREL BISULFATE 75 MG/1
75 TABLET ORAL DAILY
Status: ON HOLD | COMMUNITY
Start: 2020-09-03 | End: 2023-01-01

## 2021-08-13 RX ORDER — ONDANSETRON 2 MG/ML
INJECTION INTRAMUSCULAR; INTRAVENOUS PRN
Status: DISCONTINUED | OUTPATIENT
Start: 2021-08-13 | End: 2021-08-13

## 2021-08-13 RX ORDER — PROPOFOL 10 MG/ML
INJECTION, EMULSION INTRAVENOUS PRN
Status: DISCONTINUED | OUTPATIENT
Start: 2021-08-13 | End: 2021-08-13

## 2021-08-13 RX ORDER — ONDANSETRON 2 MG/ML
4 INJECTION INTRAMUSCULAR; INTRAVENOUS EVERY 30 MIN PRN
Status: DISCONTINUED | OUTPATIENT
Start: 2021-08-13 | End: 2021-08-13 | Stop reason: HOSPADM

## 2021-08-13 RX ORDER — ONDANSETRON 4 MG/1
4 TABLET, ORALLY DISINTEGRATING ORAL EVERY 30 MIN PRN
Status: DISCONTINUED | OUTPATIENT
Start: 2021-08-13 | End: 2021-08-13 | Stop reason: HOSPADM

## 2021-08-13 RX ADMIN — SODIUM CHLORIDE, POTASSIUM CHLORIDE, SODIUM LACTATE AND CALCIUM CHLORIDE: 600; 310; 30; 20 INJECTION, SOLUTION INTRAVENOUS at 08:13

## 2021-08-13 RX ADMIN — ONDANSETRON 4 MG: 2 INJECTION INTRAMUSCULAR; INTRAVENOUS at 08:03

## 2021-08-13 RX ADMIN — DEXAMETHASONE SODIUM PHOSPHATE 10 MG: 10 INJECTION, SOLUTION INTRAMUSCULAR; INTRAVENOUS at 07:29

## 2021-08-13 RX ADMIN — PROPOFOL 150 MG: 10 INJECTION, EMULSION INTRAVENOUS at 07:29

## 2021-08-13 RX ADMIN — PHENYLEPHRINE HYDROCHLORIDE 100 MCG: 10 INJECTION INTRAVENOUS at 07:38

## 2021-08-13 RX ADMIN — PHENYLEPHRINE HYDROCHLORIDE 100 MCG: 10 INJECTION INTRAVENOUS at 08:08

## 2021-08-13 RX ADMIN — LIDOCAINE HYDROCHLORIDE 30 MG: 10 INJECTION, SOLUTION INFILTRATION; PERINEURAL at 07:29

## 2021-08-13 RX ADMIN — SUGAMMADEX 200 MG: 100 INJECTION, SOLUTION INTRAVENOUS at 08:17

## 2021-08-13 RX ADMIN — PHENYLEPHRINE HYDROCHLORIDE 100 MCG: 10 INJECTION INTRAVENOUS at 07:48

## 2021-08-13 RX ADMIN — ROCURONIUM BROMIDE 50 MG: 10 INJECTION, SOLUTION INTRAVENOUS at 07:36

## 2021-08-13 RX ADMIN — CEFAZOLIN SODIUM 2 G: 2 INJECTION, SOLUTION INTRAVENOUS at 07:20

## 2021-08-13 RX ADMIN — FENTANYL CITRATE 50 MCG: 50 INJECTION, SOLUTION INTRAMUSCULAR; INTRAVENOUS at 07:29

## 2021-08-13 RX ADMIN — FENTANYL CITRATE 50 MCG: 50 INJECTION, SOLUTION INTRAMUSCULAR; INTRAVENOUS at 07:33

## 2021-08-13 RX ADMIN — PROPOFOL 50 MG: 10 INJECTION, EMULSION INTRAVENOUS at 07:35

## 2021-08-13 RX ADMIN — SODIUM CHLORIDE, POTASSIUM CHLORIDE, SODIUM LACTATE AND CALCIUM CHLORIDE: 600; 310; 30; 20 INJECTION, SOLUTION INTRAVENOUS at 07:19

## 2021-08-13 ASSESSMENT — MIFFLIN-ST. JEOR: SCORE: 1416.57

## 2021-08-13 ASSESSMENT — ENCOUNTER SYMPTOMS: DYSRHYTHMIAS: 1

## 2021-08-13 NOTE — OP NOTE
"[facility]  Operative Note    Pre-operative diagnosis: Inguinal hernia [K40.90]   Post-operative diagnosis * No post-op diagnosis entered *   Procedure: Procedure(s):  RIGHT INGUINAL HERNIA REPAIR , excision spermatic cord lipoma   Surgeon: Scott Hunter MD, MD   Assistants(s): None   Anesthesia: General    Estimated blood loss:  5 mL    Total IV fluids: (See anesthesia record)   Blood transfusion: No transfusion was given during surgery   Total urine output: (See anesthesia record)   Drains:  None   Specimens: * No specimens in log *    Implants: Implant Name Type Inv. Item Serial No.  Lot No. LRB No. Used Action   MESH KNITTED POLYPROPYLENE 02X4\" [id number] - [id number] Mesh MESH KNITTED POLYPROPYLENE 02X4\" [id number]  [employer] [facility] [id number] Right 1 Implanted       Findings:  Hernia.   Complications: None.   Condition: Stable       Description of procedure:  Patient was prepped draped in usual fashion after induction of a general anesthetic, right lower quadrant incision was made down to the external oblique the fascia was then opened and secured with several clamps the cord was then mobilized from the inguinal floor and encompass with a Penrose drain there was incarcerated direct inguinal hernia along with a small indirect inguinal hernia sac and spermatic cord lipoma there spermatic cord lipoma was dissected off the cord and discarded using electrocautery into small indirect inguinal hernia sac was excised and ligated with Vicryl suture.  The conjoined tendon and shelving edge of the inguinal ligament were repaired with interrupted 0 Ethibond a patch of Marlex mesh was tapered to the dimensions of the repair and sewn in onlay fashion with a running 3-0 Prolene the cord was returned to inguinal floor the external oblique and Catrachita's fascia closed with 2-0 Vicryl skin closed with 4-0 Vicryl    Scott Hunter MD, MD     "

## 2021-08-13 NOTE — ANESTHESIA PROCEDURE NOTES
Airway       Patient location during procedure: OR       Procedure Start/Stop Times: 8/13/2021 7:39 AM  Staff -        Anesthesiologist:  Phyllis Cervantes MD       CRNA: Tone James APRN CRNA       Other Anesthesia Staff: Demetria Farnsworth       Performed By: anesthesiologist, CHARMAINE and with CRNAs       Procedure performed by resident/fellow/CRNA in presence of a teaching physician.  Indications and Patient Condition       Indications for airway management: ayde-procedural         Mask difficulty assessment: 2 - vent by mask + OA or adjuvant +/- NMBA    Final Airway Details       Final airway type: endotracheal airway       Successful airway: ETT - single  Endotracheal Airway Details        ETT size (mm): 8.0       Cuffed: yes       Cuff volume (mL): 8       Successful intubation technique: direct laryngoscopy       DL Blade Type: Moura 2       Grade View of Cords: 1       Adjucts: stylet and tooth guard       Position: Right       Measured from: gums/teeth       Secured at (cm): 22       Bite block used: None    Post intubation assessment        Placement verified by: capnometry, equal breath sounds and chest rise        Number of attempts at approach: 1       Number of other approaches attempted: 0       Secured with: silk tape       Ease of procedure: easy       Dentition: Intact and Unchanged

## 2021-08-13 NOTE — ANESTHESIA CARE TRANSFER NOTE
Patient: Son Huizar    Procedure(s):  RIGHT INGUINAL HERNIA REPAIR    Diagnosis: Inguinal hernia [K40.90]  Diagnosis Additional Information: No value filed.    Anesthesia Type:   General     Note:    Oropharynx: oropharynx clear of all foreign objects and spontaneously breathing  Level of Consciousness: drowsy  Oxygen Supplementation: face mask  Level of Supplemental Oxygen (L/min / FiO2): 6      Vital Signs Stable: post-procedure vital signs reviewed and stable  Report to RN Given: handoff report given  Patient transferred to: PACU    Handoff Report: Set expectations for post-procedure period      Vitals:  Vitals Value Taken Time   /87 08/13/21 0833   Temp 36  C (96.8  F) 08/13/21 0830   Pulse 65 08/13/21 0833   Resp 7 08/13/21 0833   SpO2 100 % 08/13/21 0833   Vitals shown include unvalidated device data.    Electronically Signed By: ROBERT De Souza CRNA  August 13, 2021  8:34 AM

## 2021-08-13 NOTE — PHARMACY-ADMISSION MEDICATION HISTORY
Pharmacy Note - Admission Medication History    Pertinent Provider Information: N/A   ______________________________________________________________________    Prior To Admission (PTA) med list completed and updated in EMR.       PTA Med List   Medication Sig Last Dose     alpha lipoic acid 100 mg cap [ALPHA LIPOIC ACID 100 MG CAP] Take 100 mg by mouth daily. 8/9/2021     ascorbic acid, vitamin C, (ASCORBIC ACID WITH NATHALIE HIPS) 500 MG tablet [ASCORBIC ACID, VITAMIN C, (ASCORBIC ACID WITH NATHALIE HIPS) 500 MG TABLET] Take 500 mg by mouth daily. 8/9/2021     b complex vitamins tablet [B COMPLEX VITAMINS TABLET] Take 1 tablet by mouth daily. B Complex 50 Tablet (B Complex Vitamins) Give 1  tablet by mouth one time a day for Supplement        8/9/2021     cholecalciferol, vitamin D3, 1,000 unit tablet [CHOLECALCIFEROL, VITAMIN D3, 1,000 UNIT TABLET] Take 2,000 Units by mouth daily. 8/9/2021     clopidogrel (PLAVIX) 75 MG tablet Take 75 mg by mouth daily 8/9/2021 at AM     coenzyme Q10 (CO Q-10) 100 mg capsule [COENZYME Q10 (CO Q-10) 100 MG CAPSULE] Take 100 mg by mouth daily. 8/9/2021     ELIQUIS 5 mg Tab tablet [ELIQUIS 5 MG TAB TABLET] TAKE 1 TABLET BY MOUTH TWICE DAILY 8/9/2021 at AM     erythromycin (ROMYCIN) 5 MG/GM ophthalmic ointment Place 0.5 inches into both eyes At Bedtime  8/12/2021 at PM     fluticasone (FLONASE) 50 mcg/actuation nasal spray [FLUTICASONE (FLONASE) 50 MCG/ACTUATION NASAL SPRAY] Apply 1 spray into each nostril daily as needed.             lactobacillus rhamnosus, GG, (CULTURELL) capsule Take 1 capsule by mouth daily 8/9/2021     levOCARNitine 500 mg Tab [LEVOCARNITINE 500 MG TAB] Take 1 tablet by mouth daily as needed.      magnesium 200 mg Tab [MAGNESIUM 200 MG TAB] Take 200 mg by mouth daily. 8/9/2021     metoprolol tartrate (LOPRESSOR) 25 MG tablet [METOPROLOL TARTRATE (LOPRESSOR) 25 MG TABLET] Take 25 mg by mouth daily.        8/12/2021 at AM     Multiple Vitamins-Minerals (PRESERVISION AREDS  2+MULTI VIT PO) Take 1 capsule by mouth 2 times daily 8/9/2021     multivitamin with minerals tablet [MULTIVITAMIN WITH MINERALS TABLET] Take 1 tablet by mouth daily. 8/9/2021     omega-3/dha/epa/fish oil (FISH OIL-OMEGA-3 FATTY ACIDS) 300-1,000 mg capsule [OMEGA-3/DHA/EPA/FISH OIL (FISH OIL-OMEGA-3 FATTY ACIDS) 300-1,000 MG CAPSULE] Take 1 g by mouth daily. 8/9/2021     peg 400-propylene glycol PF (SYSTANE) 0.4-0.3 % Dpet [-PROPYLENE GLYCOL PF (SYSTANE) 0.4-0.3 % DPET] Administer 1 drop to both eyes 4 (four) times a day as needed.      SAW PALMETTO ORAL Take 1 capsule by mouth daily  8/9/2021     zinc gluconate 30 mg Tab Take 30 mg by mouth daily as needed         Information source(s): Patient and Clinic records    Method of interview communication: in-person    Patient was asked about OTC/herbal products specifically.  PTA med list reflects this.    Based on the pharmacist's assessment, the PTA med list information appears reliable    Allergies were reviewed, assessed, and updated with the patient.      Patient does not anticipate needing any multi-use medications during admission.     Thank you for the opportunity to participate in the care of this patient.      Felipe Hernández ALBERTO     8/13/2021     6:53 AM

## 2021-08-13 NOTE — ANESTHESIA PREPROCEDURE EVALUATION
Anesthesia Pre-Procedure Evaluation    Patient: Son Huizar   MRN: [id number] : 1935        Preoperative Diagnosis: Inguinal hernia [K40.90]   Procedure : Procedure(s):  RIGHT INGUINAL HERNIA REPAIR     Past Medical History:   Diagnosis Date     Aortic valve insufficiency 2015    Mild-moderate AI echo 2015      Arthritis 2010    hands     Atrial fibrillation (H) 2015     Atrial flutter (H) 07/10/2015     Cancer (H)      Essential hypertension 2014     Hematuria 07/sep/2007    unknown per pt     Hyperlipidemia 2018     Left atrial thrombus 5/3/2016    MELONY 3/9/2016      Lymphoma (H)      Macular degeneration 2015     Persistent atrial fibrillation (H) 2015     Stroke (H) 2014    acute L Thalmic stroke     Thalamic infarction (H) 2018     Thrombus of right atrial appendage     per H&P      Past Surgical History:   Procedure Laterality Date     ARTHROSCOPY SHOULDER ROTATOR CUFF REPAIR Left 2009    one     ARTHROSCOPY SHOULDER ROTATOR CUFF REPAIR Right     two times     CATARACT EXTRACTION Bilateral      HERNIA REPAIR       IR CHEST PORT PLACEMENT > 5 YRS OF AGE  2020     IR PORT PLACEMENT >5 YEARS  2020     IR PORT REMOVAL  2020     IR PORT REMOVAL  2020     KNEE SURGERY Left      NEUROPLASTY / TRANSPOSITION MEDIAN NERVE AT CARPAL TUNNEL BILATERAL Bilateral      US THORACENTESIS  2019      Allergies   Allergen Reactions     Pollen [Pollen Extract] Itching     Runny nose, sneezing, itchy eyes      Other Environmental Allergy Itching     Running nose. Itchy eye, DUST     Ragweed [Ragweeds] Itching     Running nose, itchy eyes     Yeast, Dried [Yeast] Headache     brewrys yeast only      Social History     Tobacco Use     Smoking status: Never Smoker     Smokeless tobacco: Never Used   Substance Use Topics     Alcohol use: No      Wt Readings from Last 1 Encounters:   21 73.6 kg (162 lb 4.8 oz)        Anesthesia Evaluation   Pt has  had prior anesthetic.     No history of anesthetic complications       ROS/MED HX  ENT/Pulmonary:  - neg pulmonary ROS     Neurologic:     (+) CVA, TIA,     Cardiovascular: Comment: Dances regularly prior to COVID19.  Still active.  Tolerates greater than 4 METs    (+) Dyslipidemia hypertension-----dysrhythmias, a-fib, valvular problems/murmurs type: AI     METS/Exercise Tolerance:     Hematologic:  - neg hematologic  ROS     Musculoskeletal:  - neg musculoskeletal ROS     GI/Hepatic: Comment: Right inguinal hernia      Renal/Genitourinary:       Endo:  - neg endo ROS     Psychiatric/Substance Use:  - neg psychiatric ROS     Infectious Disease:  - neg infectious disease ROS     Malignancy: Comment: Large B cell lymphoma      Other:            Physical Exam    Airway        Mallampati: I   TM distance: > 3 FB   Neck ROM: full   Mouth opening: > 3 cm    Respiratory Devices and Support         Dental  no notable dental history         Cardiovascular          Rhythm and rate: regular and normal     Pulmonary   pulmonary exam normal                OUTSIDE LABS:  CBC:   Lab Results   Component Value Date    WBC 8.5 09/06/2019    WBC 11.1 (H) 09/04/2019    HGB 13.1 (L) 09/06/2019    HGB 15.0 09/04/2019    HCT 40.5 09/06/2019    HCT 46.6 09/04/2019     12/06/2019     09/06/2019     BMP:   Lab Results   Component Value Date     08/10/2021     02/19/2021    POTASSIUM 4.5 08/10/2021    POTASSIUM 4.4 02/19/2021    CHLORIDE 103 08/10/2021    CHLORIDE 106 02/19/2021    CO2 29 08/10/2021    CO2 26 02/19/2021    BUN 22 08/10/2021    BUN 28 02/19/2021    CR 1.08 08/10/2021    CR 1.11 02/19/2021     08/10/2021     (H) 02/19/2021     COAGS:   Lab Results   Component Value Date    PTT 43 (H) 09/04/2019    INR 1.31 (H) 12/06/2019     POC: No results found for: BGM, HCG, HCGS  HEPATIC:   Lab Results   Component Value Date    ALBUMIN 3.6 02/19/2021    PROTTOTAL 6.1 02/19/2021    ALT 21 02/19/2021     AST 17 02/19/2021    ALKPHOS 78 02/19/2021    BILITOTAL 0.7 02/19/2021     OTHER:   Lab Results   Component Value Date    MALATHI 9.9 08/10/2021    MAG 1.7 (L) 09/07/2019       Anesthesia Plan    ASA Status:  3   NPO Status:  NPO Appropriate    Anesthesia Type: General.     - Airway: LMA   Induction: Intravenous.           Consents    Anesthesia Plan(s) and associated risks, benefits, and realistic alternatives discussed. Questions answered and patient/representative(s) expressed understanding.     - Discussed with:  Patient      - Extended Intubation/Ventilatory Support Discussed: No.      - Patient is DNR/DNI Status: No    Use of blood products discussed: No .     Postoperative Care    Pain management: IV analgesics.   PONV prophylaxis: Ondansetron (or other 5HT-3), Dexamethasone or Solumedrol     Comments:    Prn GETA    Maintain MAP above 70 and SBP above 100              Phyllis Cervantes MD

## 2021-08-13 NOTE — BRIEF OP NOTE
"[facility]    Brief Operative Note    Pre-operative diagnosis: Inguinal hernia [K40.90]  Post-operative diagnosis same  Procedure: Procedure(s):  RIGHT INGUINAL HERNIA REPAIR, excision spermatic cord lipoma  Surgeon: Surgeon(s) and Role:     * Scott Hunter MD - Primary  Anesthesia: General   Estimated blood loss: 5 mL  Drains: None  Specimens: * No specimens in log *  Findings:   Hernia  Complications: None.  Implants:   Implant Name Type Inv. Item Serial No.  Lot No. LRB No. Used Action   MESH KNITTED POLYPROPYLENE 02X4\" [id number] - [id number] Mesh MESH KNITTED POLYPROPYLENE 02X4\" [id number]  [facility] [id number] Right 1 Implanted           "

## 2021-08-13 NOTE — ANESTHESIA PROCEDURE NOTES
Airway       Patient location during procedure: OR       Procedure Start/Stop Times: 8/13/2021 7:32 AM  Staff -        Anesthesiologist:  Phyllis Cervantes MD       CRNA: Tone James APRN CRNA       Performed By: anesthesiologist, SRNA and with CRNAs       Procedure performed by resident/fellow/CRNA in presence of a teaching physician.  Indications and Patient Condition       Indications for airway management: ayde-procedural       Induction type:intravenous       Mask difficulty assessment: 1 - vent by mask    Final Airway Details       Final airway type: supraglottic airway    Supraglottic Airway Details        Type: LMA       Brand: Ambu [employer]uraGain       LMA size: 5    Post intubation assessment        Number of attempts at approach: 2       Ease of procedure: unable       Dentition: Intact and Unchanged    Additional Comments       MD at bedside - unable to achieve adequate seal with a LMA size 4 and 5. Converted to general/ETT per MD.

## 2021-08-13 NOTE — ANESTHESIA POSTPROCEDURE EVALUATION
Patient: oSn Huizar    Procedure(s):  RIGHT INGUINAL HERNIA REPAIR    Diagnosis:Inguinal hernia [K40.90]  Diagnosis Additional Information: No value filed.    Anesthesia Type:  General    Note:  Disposition: Outpatient   Postop Pain Control: Uneventful            Sign Out: Well controlled pain   PONV: No   Neuro/Psych: Uneventful            Sign Out: Acceptable/Baseline neuro status   Airway/Respiratory: Uneventful            Sign Out: Acceptable/Baseline resp. status   CV/Hemodynamics: Uneventful            Sign Out: Acceptable CV status; No obvious hypovolemia; No obvious fluid overload   Other NRE: NONE   DID A NON-ROUTINE EVENT OCCUR? No           Last vitals:  Vitals Value Taken Time   /74 08/13/21 0900   Temp 36.9  C (98.4  F) 08/13/21 0900   Pulse 72 08/13/21 0900   Resp 21 08/13/21 0900   SpO2 93 % 08/13/21 0900       Electronically Signed By: Phyllis Cervantes MD  August 13, 2021  10:09 AM

## 2021-10-17 ENCOUNTER — HEALTH MAINTENANCE LETTER (OUTPATIENT)
Age: 86
End: 2021-10-17

## 2022-01-01 ENCOUNTER — HEALTH MAINTENANCE LETTER (OUTPATIENT)
Age: 87
End: 2022-01-01

## 2022-01-01 ENCOUNTER — HOSPITAL ENCOUNTER (OUTPATIENT)
Dept: CT IMAGING | Facility: CLINIC | Age: 87
Discharge: HOME | End: 2022-08-17
Attending: INTERNAL MEDICINE | Admitting: INTERNAL MEDICINE
Payer: COMMERCIAL

## 2022-01-01 ENCOUNTER — LAB REQUISITION (OUTPATIENT)
Dept: LAB | Facility: CLINIC | Age: 87
End: 2022-01-01

## 2022-01-01 ENCOUNTER — LAB REQUISITION (OUTPATIENT)
Dept: LAB | Facility: CLINIC | Age: 87
End: 2022-01-01
Payer: COMMERCIAL

## 2022-01-01 DIAGNOSIS — E78.5 HYPERLIPIDEMIA, UNSPECIFIED: ICD-10-CM

## 2022-01-01 DIAGNOSIS — C83.30 DIFFUSE LARGE B CELL LYMPHOMA (H): ICD-10-CM

## 2022-01-01 DIAGNOSIS — Z01.812 ENCOUNTER FOR PREPROCEDURAL LABORATORY EXAMINATION: ICD-10-CM

## 2022-01-01 DIAGNOSIS — I10 ESSENTIAL (PRIMARY) HYPERTENSION: ICD-10-CM

## 2022-01-01 LAB
ALBUMIN SERPL BCG-MCNC: 4.2 G/DL (ref 3.5–5.2)
ALP SERPL-CCNC: 76 U/L (ref 40–129)
ALT SERPL W P-5'-P-CCNC: 22 U/L (ref 10–50)
ANION GAP SERPL CALCULATED.3IONS-SCNC: 9 MMOL/L (ref 7–15)
AST SERPL W P-5'-P-CCNC: 25 U/L (ref 10–50)
BILIRUB SERPL-MCNC: 0.6 MG/DL
BUN SERPL-MCNC: 21.7 MG/DL (ref 8–23)
CALCIUM SERPL-MCNC: 9.8 MG/DL (ref 8.8–10.2)
CHLORIDE SERPL-SCNC: 102 MMOL/L (ref 98–107)
CHOLEST SERPL-MCNC: 136 MG/DL
CREAT SERPL-MCNC: 0.96 MG/DL (ref 0.67–1.17)
DEPRECATED HCO3 PLAS-SCNC: 30 MMOL/L (ref 22–29)
GFR SERPL CREATININE-BSD FRML MDRD: 77 ML/MIN/1.73M2
GLUCOSE SERPL-MCNC: 111 MG/DL (ref 70–99)
HDLC SERPL-MCNC: 38 MG/DL
LDLC SERPL CALC-MCNC: 80 MG/DL
NONHDLC SERPL-MCNC: 98 MG/DL
POTASSIUM SERPL-SCNC: 4.2 MMOL/L (ref 3.4–5.3)
PROT SERPL-MCNC: 6.5 G/DL (ref 6.4–8.3)
SARS-COV-2 RNA RESP QL NAA+PROBE: NEGATIVE
SODIUM SERPL-SCNC: 141 MMOL/L (ref 136–145)
TRIGL SERPL-MCNC: 89 MG/DL

## 2022-01-01 PROCEDURE — 71260 CT THORAX DX C+: CPT

## 2022-01-01 PROCEDURE — 82040 ASSAY OF SERUM ALBUMIN: CPT | Performed by: STUDENT IN AN ORGANIZED HEALTH CARE EDUCATION/TRAINING PROGRAM

## 2022-01-01 PROCEDURE — 250N000011 HC RX IP 250 OP 636: Performed by: INTERNAL MEDICINE

## 2022-01-01 PROCEDURE — 80053 COMPREHEN METABOLIC PANEL: CPT | Performed by: STUDENT IN AN ORGANIZED HEALTH CARE EDUCATION/TRAINING PROGRAM

## 2022-01-01 PROCEDURE — U0005 INFEC AGEN DETEC AMPLI PROBE: HCPCS | Mod: ORL | Performed by: STUDENT IN AN ORGANIZED HEALTH CARE EDUCATION/TRAINING PROGRAM

## 2022-01-01 PROCEDURE — 80061 LIPID PANEL: CPT | Performed by: STUDENT IN AN ORGANIZED HEALTH CARE EDUCATION/TRAINING PROGRAM

## 2022-01-01 RX ORDER — IOPAMIDOL 755 MG/ML
100 INJECTION, SOLUTION INTRAVASCULAR ONCE
Status: COMPLETED | OUTPATIENT
Start: 2022-01-01 | End: 2022-01-01

## 2022-01-01 RX ADMIN — IOPAMIDOL 100 ML: 755 INJECTION, SOLUTION INTRAVENOUS at 14:54

## 2022-01-07 ENCOUNTER — HOSPITAL ENCOUNTER (OUTPATIENT)
Dept: CT IMAGING | Facility: CLINIC | Age: 87
Discharge: HOME | End: 2022-01-07
Attending: INTERNAL MEDICINE | Admitting: INTERNAL MEDICINE
Payer: COMMERCIAL

## 2022-01-07 DIAGNOSIS — C83.30 DIFFUSE LARGE B CELL LYMPHOMA (H): ICD-10-CM

## 2022-01-07 PROCEDURE — 71250 CT THORAX DX C-: CPT

## 2022-01-13 ENCOUNTER — HOSPITAL ENCOUNTER (EMERGENCY)
Facility: CLINIC | Age: 87
Discharge: HOME | End: 2022-01-13
Attending: EMERGENCY MEDICINE | Admitting: EMERGENCY MEDICINE
Payer: COMMERCIAL

## 2022-01-13 ENCOUNTER — APPOINTMENT (OUTPATIENT)
Dept: RADIOLOGY | Facility: CLINIC | Age: 87
End: 2022-01-13
Attending: EMERGENCY MEDICINE
Payer: COMMERCIAL

## 2022-01-13 VITALS
SYSTOLIC BLOOD PRESSURE: 123 MMHG | RESPIRATION RATE: 18 BRPM | OXYGEN SATURATION: 95 % | HEART RATE: 72 BPM | TEMPERATURE: 98.9 F | DIASTOLIC BLOOD PRESSURE: 70 MMHG

## 2022-01-13 DIAGNOSIS — U07.1 INFECTION DUE TO 2019 NOVEL CORONAVIRUS: ICD-10-CM

## 2022-01-13 LAB
ALBUMIN SERPL-MCNC: 3.7 G/DL (ref 3.5–5)
ALBUMIN UR-MCNC: 50 MG/DL
ALP SERPL-CCNC: 68 U/L (ref 45–120)
ALT SERPL W P-5'-P-CCNC: 15 U/L (ref 0–45)
ANION GAP SERPL CALCULATED.3IONS-SCNC: 6 MMOL/L (ref 5–18)
APPEARANCE UR: CLEAR
AST SERPL W P-5'-P-CCNC: 19 U/L (ref 0–40)
BASOPHILS # BLD MANUAL: 0 10E3/UL (ref 0–0.2)
BASOPHILS NFR BLD MANUAL: 0 %
BILIRUB SERPL-MCNC: 0.8 MG/DL (ref 0–1)
BILIRUB UR QL STRIP: NEGATIVE
BUN SERPL-MCNC: 22 MG/DL (ref 8–28)
C REACTIVE PROTEIN LHE: 2.8 MG/DL (ref 0–0.8)
CALCIUM SERPL-MCNC: 9.5 MG/DL (ref 8.5–10.5)
CHLORIDE BLD-SCNC: 104 MMOL/L (ref 98–107)
CO2 SERPL-SCNC: 28 MMOL/L (ref 22–31)
COLOR UR AUTO: YELLOW
CREAT SERPL-MCNC: 1.11 MG/DL (ref 0.7–1.3)
ELLIPTOCYTES BLD QL SMEAR: SLIGHT
EOSINOPHIL # BLD MANUAL: 0 10E3/UL (ref 0–0.7)
EOSINOPHIL NFR BLD MANUAL: 0 %
ERYTHROCYTE [DISTWIDTH] IN BLOOD BY AUTOMATED COUNT: 14 % (ref 10–15)
FLUAV RNA SPEC QL NAA+PROBE: NEGATIVE
FLUBV RNA RESP QL NAA+PROBE: NEGATIVE
GFR SERPL CREATININE-BSD FRML MDRD: 65 ML/MIN/1.73M2
GLUCOSE BLD-MCNC: 123 MG/DL (ref 70–125)
GLUCOSE UR STRIP-MCNC: NEGATIVE MG/DL
HCT VFR BLD AUTO: 48.8 % (ref 40–53)
HGB BLD-MCNC: 15.8 G/DL (ref 13.3–17.7)
HGB UR QL STRIP: NEGATIVE
HYALINE CASTS: 1 /LPF
KETONES UR STRIP-MCNC: NEGATIVE MG/DL
LEUKOCYTE ESTERASE UR QL STRIP: NEGATIVE
LYMPHOCYTES # BLD MANUAL: 1 10E3/UL (ref 0.8–5.3)
LYMPHOCYTES NFR BLD MANUAL: 14 %
MCH RBC QN AUTO: 29 PG (ref 26.5–33)
MCHC RBC AUTO-ENTMCNC: 32.4 G/DL (ref 31.5–36.5)
MCV RBC AUTO: 90 FL (ref 78–100)
MONOCYTES # BLD MANUAL: 2 10E3/UL (ref 0–1.3)
MONOCYTES NFR BLD MANUAL: 27 %
MUCOUS THREADS #/AREA URNS LPF: PRESENT /LPF
NEUTROPHILS # BLD MANUAL: 4.4 10E3/UL (ref 1.6–8.3)
NEUTROPHILS NFR BLD MANUAL: 59 %
NITRATE UR QL: NEGATIVE
PH UR STRIP: 5 [PH] (ref 5–7)
PLAT MORPH BLD: ABNORMAL
PLATELET # BLD AUTO: 165 10E3/UL (ref 150–450)
POTASSIUM BLD-SCNC: 4.2 MMOL/L (ref 3.5–5)
PROT SERPL-MCNC: 6.9 G/DL (ref 6–8)
RBC # BLD AUTO: 5.45 10E6/UL (ref 4.4–5.9)
RBC MORPH BLD: ABNORMAL
RBC URINE: 0 /HPF
SARS-COV-2 RNA RESP QL NAA+PROBE: POSITIVE
SODIUM SERPL-SCNC: 138 MMOL/L (ref 136–145)
SP GR UR STRIP: 1.03 (ref 1–1.03)
SQUAMOUS EPITHELIAL: <1 /HPF
TROPONIN I SERPL-MCNC: 0.15 NG/ML (ref 0–0.29)
TSH SERPL DL<=0.005 MIU/L-ACNC: 2.12 UIU/ML (ref 0.3–5)
UROBILINOGEN UR STRIP-MCNC: <2 MG/DL
VARIANT LYMPHS BLD QL SMEAR: PRESENT
WBC # BLD AUTO: 7.4 10E3/UL (ref 4–11)
WBC URINE: 1 /HPF

## 2022-01-13 PROCEDURE — 85027 COMPLETE CBC AUTOMATED: CPT | Performed by: PHYSICIAN ASSISTANT

## 2022-01-13 PROCEDURE — 86140 C-REACTIVE PROTEIN: CPT | Performed by: EMERGENCY MEDICINE

## 2022-01-13 PROCEDURE — 84443 ASSAY THYROID STIM HORMONE: CPT | Performed by: PHYSICIAN ASSISTANT

## 2022-01-13 PROCEDURE — 87636 SARSCOV2 & INF A&B AMP PRB: CPT | Performed by: PHYSICIAN ASSISTANT

## 2022-01-13 PROCEDURE — 36415 COLL VENOUS BLD VENIPUNCTURE: CPT | Performed by: PHYSICIAN ASSISTANT

## 2022-01-13 PROCEDURE — C9803 HOPD COVID-19 SPEC COLLECT: HCPCS

## 2022-01-13 PROCEDURE — 93005 ELECTROCARDIOGRAM TRACING: CPT | Performed by: PHYSICIAN ASSISTANT

## 2022-01-13 PROCEDURE — 80053 COMPREHEN METABOLIC PANEL: CPT | Performed by: PHYSICIAN ASSISTANT

## 2022-01-13 PROCEDURE — 84484 ASSAY OF TROPONIN QUANT: CPT | Performed by: PHYSICIAN ASSISTANT

## 2022-01-13 PROCEDURE — 99285 EMERGENCY DEPT VISIT HI MDM: CPT | Mod: 25

## 2022-01-13 PROCEDURE — 93005 ELECTROCARDIOGRAM TRACING: CPT | Performed by: EMERGENCY MEDICINE

## 2022-01-13 PROCEDURE — 71046 X-RAY EXAM CHEST 2 VIEWS: CPT

## 2022-01-13 PROCEDURE — 81001 URINALYSIS AUTO W/SCOPE: CPT | Performed by: PHYSICIAN ASSISTANT

## 2022-01-13 NOTE — ED TRIAGE NOTES
Pt given new allergy med, cetirizine on Tuesday and slept for almost 24 hours straight. Brought in by ems. Denies pain or injury. Alert and oriented times 3 currently. Family concerned that this medication is not appropriate for him.

## 2022-01-13 NOTE — ED PROVIDER NOTES
EMERGENCY DEPARTMENT ENCOUNTER      NAME: Son Huizar  AGE: 86 year old male  YOB: 1935  MRN: [id number]  EVALUATION DATE & TIME: 2022  3:01 PM    PCP: Garcia Dahl    ED PROVIDER: Gissell Benavides D.O.      CHIEF COMPLAINT:  Chief Complaint   Patient presents with     Fatigue         FINAL IMPRESSION:  1. Infection due to 2019 novel coronavirus          ED COURSE & MEDICAL DECISION MAKIN-year-old male presented to the ED for evaluation of fatigue that started last evening after he took a new prescription of cetirizine for suspected allergies. Patient denies feeling any fatigue prior to this. He did report a mild cough for the last week which he attributed to his allergies. Here in the ED patient had no complaints and he he denied any chest pain, shortness of breath, fevers, headaches, nausea,, or diarrhea. Upon arrival to the ED the patient was noted to be slightly hypertensive but he was otherwise hemodynamically stable. The patient did not appear to be in any obvious distress or discomfort at the time of his initial evaluation. His physical exam was unremarkable.    An EKG was obtained which revealed atrial fibrillation without any new or concerning ST or T wave changes. CBC, BMP, hepatic panel, UA, TSH, and troponin were all reassuring. The patient CRP was slightly elevated 2.8. The chest x-ray was nondiagnostic.    COVID testing was positive.    The patient was reevaluated and informed of the lab, EKG, and chest x-ray results. Patient was informed that his fatigue and cough related to the COVID infection. Patient was educated about COVID and reassured. The patient remained hemodynamically stable with O2 sat in the upper 90s throughout his ED stay. After educating and reassuring the patient he felt comfortable returning home. The patient was instructed to follow-up with his primary care physician for reevaluation as needed or to return to the ED sooner for any worsening  respiratory difficulty or any other new or concerning symptoms.       4:30 PM I met with the patient, obtained history, performed an initial exam, and discussed options and plan for diagnostics and treatment here in the ED. I wore a n95 mask and eye goggles.    At the conclusion of the encounter I discussed the results of all of the tests and the disposition. The questions were answered. The patient or family acknowledged understanding and was agreeable with the care plan.     MEDICATIONS GIVEN IN THE EMERGENCY:  Medications - No data to display    NEW PRESCRIPTIONS STARTED AT TODAY'S ER VISIT:  Discharge Medication List as of 1/13/2022  6:56 PM             =================================================================    HPI  Son Huizar is a 86 year old male with a history of a-fib, HTN, and stroke, who presents for evaluation of fatigue.    The patient reports fatigue that started last night after his first dose of Cetrizine. The patient reports one week of a cough.  He initially thought it was allergies, so he went to his primary care physician who prescribed him cetirizine. After taking the cetirizine last evening, patient became extreamly fatigued and tired. Patient notes he has not started any other new medications.     He denies shortness of breath, chest pain or pressure, fever, abdominal pain, nausea, vomiting, diarrhea, headaches, or dizziness. No other symptoms expressed at this time.     Social: Patient is not a smoker or drinker.       I, Stef Dalton am serving as a scribe to document services personally performed by Dr. Gissell Benavides DO, based on my observation and the provider's statements to me. I, Dr. Gissell Benavides DO attest that Stef Dalton is acting in a scribe capacity, has observed my performance of the services and has documented them in accordance with my direction.      REVIEW OF SYSTEMS   Constitutional: Denies fever, chills, unintentional weight loss. Positive for fatigue.     Eyes: Denies visual changes or discharge    HENT: Denies sore throat, ear pain or neck pain  Respiratory: Denies shortness of breath. Positive for cough.   Cardiovascular: Denies chest pain, palpitations or leg swelling  GI: Denies abdominal pain, nausea, vomiting, diarrhea, or dark, bloody stools.    : Denies hematuria, dysuria, or flank pain  Musculoskeletal: Denies any new back pain or new muscle/joint pains  Skin: Denies rash or wound  Neurologic: Denies current headache, dizziness, new weakness, focal weakness, or sensory changes    Lymphatic: Denies swollen glands    Psychiatric: Denies depression, suicidal ideation or homicidal ideation.    Remainder of systems reviewed, unless noted in HPI all others negative.      PAST MEDICAL HISTORY:  Past Medical History:   Diagnosis Date     Aortic valve insufficiency 7/11/2015    Mild-moderate AI echo July 2015      Arthritis 2010    hands     Atrial fibrillation (H) 2015     Atrial flutter (H) 07/10/2015     Cancer (H)      Essential hypertension 2014     Hematuria 07/sep/2007    unknown per pt     Hyperlipidemia 4/13/2018     Left atrial thrombus 5/3/2016    MELONY 3/9/2016      Lymphoma (H)      Macular degeneration 2015     Persistent atrial fibrillation (H) 7/24/2015     Stroke (H) 03/04/2014    acute L Thalmic stroke     Thalamic infarction (H) 9/14/2018     Thrombus of right atrial appendage     per H&P       PAST SURGICAL HISTORY:  Past Surgical History:   Procedure Laterality Date     ARTHROSCOPY SHOULDER ROTATOR CUFF REPAIR Left 2009    one     ARTHROSCOPY SHOULDER ROTATOR CUFF REPAIR Right 2008    two times     CATARACT EXTRACTION Bilateral 2015     HERNIA REPAIR  2015     HERNIORRHAPHY INGUINAL Right 8/13/2021    Procedure: RIGHT INGUINAL HERNIA REPAIR;  Surgeon: Scott Hunter MD;  Location: [facility] Main OR     IR CHEST PORT PLACEMENT > 5 YRS OF AGE  1/14/2020     IR PORT PLACEMENT >5 YEARS  1/14/2020     IR PORT REMOVAL  6/19/2020     IR PORT  REMOVAL  6/19/2020     KNEE SURGERY Left 1980     NEUROPLASTY / TRANSPOSITION MEDIAN NERVE AT CARPAL TUNNEL BILATERAL Bilateral 2007     US THORACENTESIS  12/6/2019           CURRENT MEDICATIONS:    Prior to Admission medications    Medication Sig Start Date End Date Taking? Authorizing Provider   alpha lipoic acid 100 mg cap [ALPHA LIPOIC ACID 100 MG CAP] Take 100 mg by mouth daily. 9/24/18   Provider, Historical   ascorbic acid, vitamin C, (ASCORBIC ACID WITH NATHALIE HIPS) 500 MG tablet [ASCORBIC ACID, VITAMIN C, (ASCORBIC ACID WITH NATHALIE HIPS) 500 MG TABLET] Take 500 mg by mouth daily. 9/24/18   Provider, Historical   b complex vitamins tablet [B COMPLEX VITAMINS TABLET] Take 1 tablet by mouth daily. B Complex 50 Tablet (B Complex Vitamins) Give 1  tablet by mouth one time a day for Supplement        9/24/18   Provider, Historical   cholecalciferol, vitamin D3, 1,000 unit tablet [CHOLECALCIFEROL, VITAMIN D3, 1,000 UNIT TABLET] Take 2,000 Units by mouth daily. 9/24/18   Provider, Historical   clopidogrel (PLAVIX) 75 MG tablet Take 75 mg by mouth daily 9/3/20   Unknown, Entered By History   coenzyme Q10 (CO Q-10) 100 mg capsule [COENZYME Q10 (CO Q-10) 100 MG CAPSULE] Take 100 mg by mouth daily. 5/3/16   Provider, Historical   ELIQUIS 5 mg Tab tablet [ELIQUIS 5 MG TAB TABLET] TAKE 1 TABLET BY MOUTH TWICE DAILY 5/24/19   Sandeep Mejia MD   erythromycin (ROMYCIN) 5 MG/GM ophthalmic ointment Place 0.5 inches into both eyes At Bedtime  8/2/21   Unknown, Entered By History   fluticasone (FLONASE) 50 mcg/actuation nasal spray [FLUTICASONE (FLONASE) 50 MCG/ACTUATION NASAL SPRAY] Apply 1 spray into each nostril daily as needed.        9/24/18   Provider, Historical   lactobacillus rhamnosus, GG, (CULTURELL) capsule Take 1 capsule by mouth daily    Unknown, Entered By History   levOCARNitine 500 mg Tab [LEVOCARNITINE 500 MG TAB] Take 1 tablet by mouth daily as needed. 9/24/18   Provider, Historical   magnesium 200 mg Tab  [MAGNESIUM 200 MG TAB] Take 200 mg by mouth daily. 9/24/18   Provider, Historical   metoprolol tartrate (LOPRESSOR) 25 MG tablet [METOPROLOL TARTRATE (LOPRESSOR) 25 MG TABLET] Take 25 mg by mouth daily.        5/16/18   Provider, Historical   Multiple Vitamins-Minerals (PRESERVISION AREDS 2+MULTI VIT PO) Take 1 capsule by mouth 2 times daily    Unknown, Entered By History   multivitamin with minerals tablet [MULTIVITAMIN WITH MINERALS TABLET] Take 1 tablet by mouth daily. 9/24/18   Provider, Historical   omega-3/dha/epa/fish oil (FISH OIL-OMEGA-3 FATTY ACIDS) 300-1,000 mg capsule [OMEGA-3/DHA/EPA/FISH OIL (FISH OIL-OMEGA-3 FATTY ACIDS) 300-1,000 MG CAPSULE] Take 1 g by mouth daily. 9/24/18   Provider, Historical   peg 400-propylene glycol PF (SYSTANE) 0.4-0.3 % Dpet [-PROPYLENE GLYCOL PF (SYSTANE) 0.4-0.3 % DPET] Administer 1 drop to both eyes 4 (four) times a day as needed. 9/4/19   Provider, Historical   SAW PALMETTO ORAL Take 1 capsule by mouth daily  2/17/16   Provider, Historical   zinc gluconate 30 mg Tab Take 30 mg by mouth daily as needed  9/24/18   Provider, Historical         ALLERGIES:  Allergies   Allergen Reactions     Pollen [Pollen Extract] Itching     Runny nose, sneezing, itchy eyes      Other Environmental Allergy Itching     Running nose. Itchy eye, DUST     Ragweed [Ragweeds] Itching     Running nose, itchy eyes     Yeast, Dried [Yeast] Headache     brewrys yeast only       FAMILY HISTORY:  Family History   Problem Relation Age of Onset     Cancer Father      No Known Problems Mother      Cancer Sister      No Known Problems Brother        SOCIAL HISTORY:   Social History     Socioeconomic History     Marital status:      Spouse name: Not on file     Number of children: Not on file     Years of education: Not on file     Highest education level: Not on file   Occupational History     Not on file   Tobacco Use     Smoking status: Never Smoker     Smokeless tobacco: Never Used    Substance and Sexual Activity     Alcohol use: No     Drug use: No     Sexual activity: Not on file   Other Topics Concern     Parent/sibling w/ CABG, MI or angioplasty before 65F 55M? Not Asked   Social History Narrative    Lives with family.      Social Determinants of Health     Financial Resource Strain: Not on file   Food Insecurity: Not on file   Transportation Needs: Not on file   Physical Activity: Not on file   Stress: Not on file   Social Connections: Not on file   Intimate Partner Violence: Not on file   Housing Stability: Not on file       PHYSICAL EXAM    /70   Pulse 72   Temp 98.9  F (37.2  C)   Resp 18   SpO2 95%   General presentation: Alert, Vital signs reviewed. NAD  HENT: ENT inspection is normal. Oropharynx is moist and clear.   Eye: Pupils are equal and reactive to light. EOMI  Neck: The neck is supple, with full ROM, with no evidence of meningismus.  Pulmonary: Currently in no acute respiratory distress. Normal, non labored respirations, the lung sounds are normal with good equal air movement. Clear to auscultation bilaterally.   Circulatory: Regular rate and rhythm. Peripheral pulses are strong and equal. No murmurs, rubs, or gallops.   Abdominal: The abdomen is soft. Nontender. No rigidity, guarding, or rebound. Bowel sounds normal.   Neurologic: Alert, oriented to person, place, and time. No motor deficit. No sensory deficit. Cranial nerves II through XII are intact.  Musculoskeletal: No extremity tenderness. Full range of motion in all extremities. No extremity edema.   Skin: Skin color is normal. No rash. Warm. Dry to touch.        LAB:  All pertinent labs reviewed and interpreted.  Labs Ordered and Resulted from Time of ED Arrival to Time of ED Departure   ROUTINE UA WITH MICROSCOPIC REFLEX TO CULTURE - Abnormal       Result Value    Color Urine Yellow      Appearance Urine Clear      Glucose Urine Negative      Bilirubin Urine Negative      Ketones Urine Negative       Specific Gravity Urine 1.029      Blood Urine Negative      pH Urine 5.0      Protein Albumin Urine 50  (*)     Urobilinogen Urine <2.0      Nitrite Urine Negative      Leukocyte Esterase Urine Negative      Mucus Urine Present (*)     RBC Urine 0      WBC Urine 1      Squamous Epithelials Urine <1      Hyaline Casts Urine 1     INFLUENZA A/B & SARS-COV2 PCR MULTIPLEX - Abnormal    Influenza A PCR Negative      Influenza B PCR Negative      SARS CoV2 PCR Positive (*)    DIFFERENTIAL - Abnormal    % Neutrophils 59      % Lymphocytes 14      % Monocytes 27      % Eosinophils 0      % Basophils 0      Absolute Neutrophils 4.4      Absolute Lymphocytes 1.0      Absolute Monocytes 2.0 (*)     Absolute Eosinophils 0.0      Absolute Basophils 0.0      RBC Morphology Confirmed RBC Indices      Platelet Assessment        Value: Automated Count Confirmed. Platelet morphology is normal.    Elliptocytes Slight (*)     Reactive Lymphocytes Present (*)    CRP INFLAMMATION - Abnormal    CRP 2.8 (*)    COMPREHENSIVE METABOLIC PANEL - Normal    Sodium 138      Potassium 4.2      Chloride 104      Carbon Dioxide (CO2) 28      Anion Gap 6      Urea Nitrogen 22      Creatinine 1.11      Calcium 9.5      Glucose 123      Alkaline Phosphatase 68      AST 19      ALT 15      Protein Total 6.9      Albumin 3.7      Bilirubin Total 0.8      GFR Estimate 65     TROPONIN I - Normal    Troponin I 0.15     TSH WITH FREE T4 REFLEX - Normal    TSH 2.12     CBC WITH PLATELETS AND DIFFERENTIAL - Normal    WBC Count 7.4      RBC Count 5.45      Hemoglobin 15.8      Hematocrit 48.8      MCV 90      MCH 29.0      MCHC 32.4      RDW 14.0      Platelet Count 165         RADIOLOGY:  Reviewed all pertinent imaging. Please see official radiology reports  Chest XR,  PA & LAT   Final Result   IMPRESSION: Stable small right pleural effusion with adjacent consolidation. Minimal left basilar atelectasis versus scarring. Nonspecific chronic right lung reticular  opacities are similar to the comparison CT chest exams. Upper normal heart size. No    overt vascular congestion.            EKG:    Atrial fibrillation. Rate of 82. Normal QRS. Normal QT. No ST or T wave changes. Compared to the EKG on 10/3/2019 no significant changes are noted.    I have independently reviewed and interpreted the EKG(s) documented above.    PROCEDURES:   None      I, Stef Dalton, am serving as a scribe to document services personally performed by Dr. Gissell Benavides based on my observation and the provider's statements to me. I, Gissell Benavides, DO attest that Stef Dalton is acting in a scribe capacity, has observed my performance of the services and has documented them in accordance with my direction.    Gissell Benavides D.O.  Emergency Medicine  [facility] EMERGENCY ROOM  [address] [facility] [id number] [phone]  Dept: [phone]        Gissell Benavides DO  01/13/22 2222

## 2022-01-14 LAB
ATRIAL RATE - MUSE: 82 BPM
DIASTOLIC BLOOD PRESSURE - MUSE: NORMAL MMHG
INTERPRETATION ECG - MUSE: NORMAL
P AXIS - MUSE: NORMAL DEGREES
PR INTERVAL - MUSE: 150 MS
QRS DURATION - MUSE: 92 MS
QT - MUSE: 382 MS
QTC - MUSE: 446 MS
R AXIS - MUSE: -84 DEGREES
SYSTOLIC BLOOD PRESSURE - MUSE: NORMAL MMHG
T AXIS - MUSE: -27 DEGREES
VENTRICULAR RATE- MUSE: 82 BPM

## 2022-01-14 NOTE — DISCHARGE INSTRUCTIONS
You tested positive for COVID-19 here today in the ED.  This is the suspected cause of your cough and fatigue.  The remainder of the laboratory tests, EKG, and chest x-ray were reassuring.  Please follow-up with the [place] Department of Health website to determine if you are a candidate to receive monoclonal antibody therapy.  Return back to the ED for any worsening respiratory difficulty or any other new or concerning symptoms.        You have tested positive for COVID-19 and may be a candidate for treatment with monoclonal antibodies.  Monoclonal antibody treatment is a limited resource and patients must go through a randomization process and, if selected, can then receive infusion of monoclonal antibody.  You can submit your name for consideration of randomization through the [place] [employer] [facility] [place] [employer] Platform (MNRAP) by going to the website listed below and following enrollment instructions.      [email or web address]    Monoclonal antibody treatment can be used in people 12 years of age and older who weigh at least 88 pounds (40 kg) and:    1. Test positive for COVID-19  2. Are within 10 days of the start of their symptoms.  3. NOT BE HOSPITALIZED      What is monoclonal antibody treatment?    Antibodies are proteins that people's bodies make to fight viruses, such as the virus that causes COVID-19.  Antibodies made in a laboratory act a lot like natural antibodies to limit the amount of virus in your body.  They are called monoclonal antibodies.  Antibodies must be given into a vein by intravenous (IV) infusion.  Antibodies may be administered only in settings where health care providers have immediate access to medications to treat any reactions and where emergency medical systems are available, if needed.  Monoclonal antibody treatment with bamlanivimab and etesevimab or with casirivimab and imdevimab are for people who have tested  positive for COVID-19 and have mild to moderate symptoms. These treatments are allowed by the U.S. Food and Drug Administration (FDA) under an Emergency Use Authorization (EUA) while clinical studies continue to look at their usefulness and safety.    What are the benefits?    Clinical trials for monoclonal antibodies against COVID-19 have shown a decrease in hospitalizations and emergency room visits and a decrease in the amount of virus carried by an infected person. Studies are still ongoing.    What is Emergency Use Authorization?    Drug treatments available through [facility] are authorized by the FDA under an emergency use authorization (EUA), meaning they have not been fully FDA-approved. In an emergency, like a pandemic, it may not be possible to have all the evidence that the FDA would usually have before approving a treatment. In this situation, the FDA can make a judgment to release drug treatments for use before it's fully approved. If there's evidence that strongly suggests that patients have benefited from a treatment, the agency can issue an EUA to make it available. An EUA does not mean that a treatment has not been studied, or that a treatment is experimental. Receiving a drug under an EUA is different than participating in a clinical trial.

## 2023-01-01 ENCOUNTER — PATIENT OUTREACH (OUTPATIENT)
Dept: CARE COORDINATION | Facility: CLINIC | Age: 88
End: 2023-01-01
Payer: COMMERCIAL

## 2023-01-01 ENCOUNTER — APPOINTMENT (OUTPATIENT)
Dept: OCCUPATIONAL THERAPY | Facility: CLINIC | Age: 88
DRG: 435 | End: 2023-01-01
Payer: COMMERCIAL

## 2023-01-01 ENCOUNTER — APPOINTMENT (OUTPATIENT)
Dept: SPEECH THERAPY | Facility: CLINIC | Age: 88
DRG: 435 | End: 2023-01-01
Attending: STUDENT IN AN ORGANIZED HEALTH CARE EDUCATION/TRAINING PROGRAM
Payer: COMMERCIAL

## 2023-01-01 ENCOUNTER — APPOINTMENT (OUTPATIENT)
Dept: CT IMAGING | Facility: CLINIC | Age: 88
DRG: 435 | End: 2023-01-01
Attending: STUDENT IN AN ORGANIZED HEALTH CARE EDUCATION/TRAINING PROGRAM
Payer: COMMERCIAL

## 2023-01-01 ENCOUNTER — APPOINTMENT (OUTPATIENT)
Dept: CT IMAGING | Facility: CLINIC | Age: 88
DRG: 435 | End: 2023-01-01
Attending: NURSE PRACTITIONER
Payer: COMMERCIAL

## 2023-01-01 ENCOUNTER — APPOINTMENT (OUTPATIENT)
Dept: RADIOLOGY | Facility: CLINIC | Age: 88
DRG: 435 | End: 2023-01-01
Attending: NURSE PRACTITIONER
Payer: COMMERCIAL

## 2023-01-01 ENCOUNTER — HOSPITAL ENCOUNTER (INPATIENT)
Facility: CLINIC | Age: 88
LOS: 14 days | Discharge: HOSPICE | DRG: 435 | End: 2023-01-23
Attending: EMERGENCY MEDICINE | Admitting: FAMILY MEDICINE
Payer: COMMERCIAL

## 2023-01-01 VITALS
BODY MASS INDEX: 23.8 KG/M2 | TEMPERATURE: 98.1 F | HEART RATE: 90 BPM | SYSTOLIC BLOOD PRESSURE: 91 MMHG | HEIGHT: 69 IN | OXYGEN SATURATION: 97 % | RESPIRATION RATE: 16 BRPM | WEIGHT: 160.72 LBS | DIASTOLIC BLOOD PRESSURE: 61 MMHG

## 2023-01-01 DIAGNOSIS — K92.2 GI BLEED: ICD-10-CM

## 2023-01-01 DIAGNOSIS — R16.0 LIVER MASS: ICD-10-CM

## 2023-01-01 DIAGNOSIS — A41.9 SEPSIS (H): ICD-10-CM

## 2023-01-01 DIAGNOSIS — K86.89 PANCREATIC MASS: ICD-10-CM

## 2023-01-01 DIAGNOSIS — D64.9 ANEMIA, UNSPECIFIED TYPE: ICD-10-CM

## 2023-01-01 DIAGNOSIS — I63.531 CEREBROVASCULAR ACCIDENT (CVA) DUE TO OCCLUSION OF RIGHT POSTERIOR CEREBRAL ARTERY (H): Primary | ICD-10-CM

## 2023-01-01 DIAGNOSIS — Z79.01 ANTICOAGULATED: ICD-10-CM

## 2023-01-01 LAB
ABO/RH(D): NORMAL
ALBUMIN SERPL-MCNC: 2.9 G/DL (ref 3.5–5)
ALBUMIN UR-MCNC: 50 MG/DL
ALP SERPL-CCNC: 205 U/L (ref 45–120)
ALT SERPL W P-5'-P-CCNC: 21 U/L (ref 0–45)
ANION GAP SERPL CALCULATED.3IONS-SCNC: 12 MMOL/L (ref 5–18)
ANION GAP SERPL CALCULATED.3IONS-SCNC: 12 MMOL/L (ref 5–18)
ANTIBODY SCREEN: NEGATIVE
APPEARANCE UR: CLEAR
APTT PPP: 37 SECONDS (ref 22–38)
AST SERPL W P-5'-P-CCNC: 29 U/L (ref 0–40)
ATRIAL RATE - MUSE: 97 BPM
ATRIAL RATE - MUSE: 97 BPM
BACTERIA BLD CULT: NO GROWTH
BACTERIA BLD CULT: NO GROWTH
BASOPHILS # BLD AUTO: 0 10E3/UL (ref 0–0.2)
BASOPHILS NFR BLD AUTO: 0 %
BILIRUB SERPL-MCNC: 0.8 MG/DL (ref 0–1)
BILIRUB UR QL STRIP: NEGATIVE
BLD PROD TYP BPU: NORMAL
BLOOD COMPONENT TYPE: NORMAL
BNP SERPL-MCNC: 323 PG/ML (ref 0–93)
BUN SERPL-MCNC: 18 MG/DL (ref 8–28)
BUN SERPL-MCNC: 24 MG/DL (ref 8–28)
CALCIUM SERPL-MCNC: 8.6 MG/DL (ref 8.5–10.5)
CALCIUM SERPL-MCNC: 9.5 MG/DL (ref 8.5–10.5)
CHLORIDE BLD-SCNC: 103 MMOL/L (ref 98–107)
CHLORIDE BLD-SCNC: 103 MMOL/L (ref 98–107)
CO2 SERPL-SCNC: 23 MMOL/L (ref 22–31)
CO2 SERPL-SCNC: 23 MMOL/L (ref 22–31)
CODING SYSTEM: NORMAL
COLOR UR AUTO: YELLOW
CREAT SERPL-MCNC: 0.92 MG/DL (ref 0.7–1.3)
CREAT SERPL-MCNC: 1.02 MG/DL (ref 0.7–1.3)
CROSSMATCH: NORMAL
DIASTOLIC BLOOD PRESSURE - MUSE: 63 MMHG
DIASTOLIC BLOOD PRESSURE - MUSE: NORMAL MMHG
EOSINOPHIL # BLD AUTO: 0 10E3/UL (ref 0–0.7)
EOSINOPHIL NFR BLD AUTO: 0 %
ERYTHROCYTE [DISTWIDTH] IN BLOOD BY AUTOMATED COUNT: 16.5 % (ref 10–15)
ERYTHROCYTE [DISTWIDTH] IN BLOOD BY AUTOMATED COUNT: 16.5 % (ref 10–15)
ERYTHROCYTE [DISTWIDTH] IN BLOOD BY AUTOMATED COUNT: 16.6 % (ref 10–15)
ERYTHROCYTE [DISTWIDTH] IN BLOOD BY AUTOMATED COUNT: 17.9 % (ref 10–15)
GFR SERPL CREATININE-BSD FRML MDRD: 72 ML/MIN/1.73M2
GFR SERPL CREATININE-BSD FRML MDRD: 82 ML/MIN/1.73M2
GLUCOSE BLD-MCNC: 192 MG/DL (ref 70–125)
GLUCOSE BLD-MCNC: 227 MG/DL (ref 70–125)
GLUCOSE BLDC GLUCOMTR-MCNC: 235 MG/DL (ref 70–99)
GLUCOSE UR STRIP-MCNC: NEGATIVE MG/DL
HCT VFR BLD AUTO: 21.2 % (ref 40–53)
HCT VFR BLD AUTO: 24.4 % (ref 40–53)
HCT VFR BLD AUTO: 26.4 % (ref 40–53)
HCT VFR BLD AUTO: 26.6 % (ref 40–53)
HEMOCCULT SP1 STL QL: POSITIVE
HGB BLD-MCNC: 6.3 G/DL (ref 13.3–17.7)
HGB BLD-MCNC: 7.4 G/DL (ref 13.3–17.7)
HGB BLD-MCNC: 7.7 G/DL (ref 13.3–17.7)
HGB BLD-MCNC: 7.9 G/DL (ref 13.3–17.7)
HGB BLD-MCNC: 7.9 G/DL (ref 13.3–17.7)
HGB BLD-MCNC: 8.1 G/DL (ref 13.3–17.7)
HGB BLD-MCNC: 8.5 G/DL (ref 13.3–17.7)
HGB UR QL STRIP: NEGATIVE
IMM GRANULOCYTES # BLD: 0.2 10E3/UL
IMM GRANULOCYTES NFR BLD: 1 %
INR PPP: 1.39 (ref 0.85–1.15)
INTERPRETATION ECG - MUSE: NORMAL
INTERPRETATION ECG - MUSE: NORMAL
ISSUE DATE AND TIME: NORMAL
KETONES UR STRIP-MCNC: NEGATIVE MG/DL
LACTATE SERPL-SCNC: 1.4 MMOL/L (ref 0.7–2)
LACTATE SERPL-SCNC: 2.1 MMOL/L (ref 0.7–2)
LACTATE SERPL-SCNC: 2.4 MMOL/L (ref 0.7–2)
LEUKOCYTE ESTERASE UR QL STRIP: NEGATIVE
LYMPHOCYTES # BLD AUTO: 0.6 10E3/UL (ref 0.8–5.3)
LYMPHOCYTES NFR BLD AUTO: 3 %
MAGNESIUM SERPL-MCNC: 1.8 MG/DL (ref 1.8–2.6)
MCH RBC QN AUTO: 23.6 PG (ref 26.5–33)
MCH RBC QN AUTO: 23.7 PG (ref 26.5–33)
MCH RBC QN AUTO: 23.8 PG (ref 26.5–33)
MCH RBC QN AUTO: 24.6 PG (ref 26.5–33)
MCHC RBC AUTO-ENTMCNC: 29.7 G/DL (ref 31.5–36.5)
MCHC RBC AUTO-ENTMCNC: 29.9 G/DL (ref 31.5–36.5)
MCHC RBC AUTO-ENTMCNC: 30.3 G/DL (ref 31.5–36.5)
MCHC RBC AUTO-ENTMCNC: 30.5 G/DL (ref 31.5–36.5)
MCV RBC AUTO: 78 FL (ref 78–100)
MCV RBC AUTO: 79 FL (ref 78–100)
MCV RBC AUTO: 80 FL (ref 78–100)
MCV RBC AUTO: 81 FL (ref 78–100)
MONOCYTES # BLD AUTO: 2 10E3/UL (ref 0–1.3)
MONOCYTES NFR BLD AUTO: 10 %
MUCOUS THREADS #/AREA URNS LPF: PRESENT /LPF
NEUTROPHILS # BLD AUTO: 18.4 10E3/UL (ref 1.6–8.3)
NEUTROPHILS NFR BLD AUTO: 86 %
NITRATE UR QL: NEGATIVE
NRBC # BLD AUTO: 0 10E3/UL
NRBC BLD AUTO-RTO: 0 /100
P AXIS - MUSE: 108 DEGREES
P AXIS - MUSE: NORMAL DEGREES
PH UR STRIP: 5.5 [PH] (ref 5–7)
PLATELET # BLD AUTO: 259 10E3/UL (ref 150–450)
PLATELET # BLD AUTO: 272 10E3/UL (ref 150–450)
PLATELET # BLD AUTO: 274 10E3/UL (ref 150–450)
PLATELET # BLD AUTO: 362 10E3/UL (ref 150–450)
POTASSIUM BLD-SCNC: 3.6 MMOL/L (ref 3.5–5)
POTASSIUM BLD-SCNC: 4 MMOL/L (ref 3.5–5)
PR INTERVAL - MUSE: 176 MS
PR INTERVAL - MUSE: NORMAL MS
PROT SERPL-MCNC: 6.7 G/DL (ref 6–8)
QRS DURATION - MUSE: 148 MS
QRS DURATION - MUSE: 164 MS
QT - MUSE: 394 MS
QT - MUSE: 418 MS
QTC - MUSE: 503 MS
QTC - MUSE: 519 MS
R AXIS - MUSE: -85 DEGREES
R AXIS - MUSE: 241 DEGREES
RBC # BLD AUTO: 2.65 10E6/UL (ref 4.4–5.9)
RBC # BLD AUTO: 3.01 10E6/UL (ref 4.4–5.9)
RBC # BLD AUTO: 3.33 10E6/UL (ref 4.4–5.9)
RBC # BLD AUTO: 3.43 10E6/UL (ref 4.4–5.9)
RBC URINE: 17 /HPF
SODIUM SERPL-SCNC: 138 MMOL/L (ref 136–145)
SODIUM SERPL-SCNC: 138 MMOL/L (ref 136–145)
SP GR UR STRIP: 1.03 (ref 1–1.03)
SPECIMEN EXPIRATION DATE: NORMAL
SYSTOLIC BLOOD PRESSURE - MUSE: 103 MMHG
SYSTOLIC BLOOD PRESSURE - MUSE: NORMAL MMHG
T AXIS - MUSE: -2 DEGREES
T AXIS - MUSE: 23 DEGREES
TROPONIN I SERPL-MCNC: 0.09 NG/ML (ref 0–0.29)
TROPONIN I SERPL-MCNC: 0.23 NG/ML (ref 0–0.29)
UNIT ABO/RH: NORMAL
UNIT NUMBER: NORMAL
UNIT STATUS: NORMAL
UNIT TYPE ISBT: 6200
UROBILINOGEN UR STRIP-MCNC: 2 MG/DL
VENTRICULAR RATE- MUSE: 93 BPM
VENTRICULAR RATE- MUSE: 98 BPM
WBC # BLD AUTO: 16.3 10E3/UL (ref 4–11)
WBC # BLD AUTO: 17.4 10E3/UL (ref 4–11)
WBC # BLD AUTO: 19.1 10E3/UL (ref 4–11)
WBC # BLD AUTO: 21.4 10E3/UL (ref 4–11)
WBC URINE: 1 /HPF

## 2023-01-01 PROCEDURE — 120N000001 HC R&B MED SURG/OB

## 2023-01-01 PROCEDURE — 250N000013 HC RX MED GY IP 250 OP 250 PS 637

## 2023-01-01 PROCEDURE — 70450 CT HEAD/BRAIN W/O DYE: CPT

## 2023-01-01 PROCEDURE — 97535 SELF CARE MNGMENT TRAINING: CPT | Mod: GO

## 2023-01-01 PROCEDURE — 99232 SBSQ HOSP IP/OBS MODERATE 35: CPT | Mod: GC

## 2023-01-01 PROCEDURE — 83605 ASSAY OF LACTIC ACID: CPT

## 2023-01-01 PROCEDURE — 85027 COMPLETE CBC AUTOMATED: CPT | Performed by: STUDENT IN AN ORGANIZED HEALTH CARE EDUCATION/TRAINING PROGRAM

## 2023-01-01 PROCEDURE — 93005 ELECTROCARDIOGRAM TRACING: CPT | Performed by: STUDENT IN AN ORGANIZED HEALTH CARE EDUCATION/TRAINING PROGRAM

## 2023-01-01 PROCEDURE — 97166 OT EVAL MOD COMPLEX 45 MIN: CPT | Mod: GO

## 2023-01-01 PROCEDURE — 85730 THROMBOPLASTIN TIME PARTIAL: CPT | Performed by: STUDENT IN AN ORGANIZED HEALTH CARE EDUCATION/TRAINING PROGRAM

## 2023-01-01 PROCEDURE — 250N000013 HC RX MED GY IP 250 OP 250 PS 637: Performed by: FAMILY MEDICINE

## 2023-01-01 PROCEDURE — 86901 BLOOD TYPING SEROLOGIC RH(D): CPT | Performed by: EMERGENCY MEDICINE

## 2023-01-01 PROCEDURE — 70498 CT ANGIOGRAPHY NECK: CPT

## 2023-01-01 PROCEDURE — 83605 ASSAY OF LACTIC ACID: CPT | Performed by: NURSE PRACTITIONER

## 2023-01-01 PROCEDURE — 83880 ASSAY OF NATRIURETIC PEPTIDE: CPT | Performed by: STUDENT IN AN ORGANIZED HEALTH CARE EDUCATION/TRAINING PROGRAM

## 2023-01-01 PROCEDURE — 93005 ELECTROCARDIOGRAM TRACING: CPT | Performed by: NURSE PRACTITIONER

## 2023-01-01 PROCEDURE — 99231 SBSQ HOSP IP/OBS SF/LOW 25: CPT | Mod: GC | Performed by: STUDENT IN AN ORGANIZED HEALTH CARE EDUCATION/TRAINING PROGRAM

## 2023-01-01 PROCEDURE — 93010 ELECTROCARDIOGRAM REPORT: CPT | Performed by: GENERAL ACUTE CARE HOSPITAL

## 2023-01-01 PROCEDURE — 250N000011 HC RX IP 250 OP 636: Performed by: NURSE PRACTITIONER

## 2023-01-01 PROCEDURE — 99238 HOSP IP/OBS DSCHRG MGMT 30/<: CPT | Mod: GC

## 2023-01-01 PROCEDURE — 85027 COMPLETE CBC AUTOMATED: CPT

## 2023-01-01 PROCEDURE — 92526 ORAL FUNCTION THERAPY: CPT | Mod: GN

## 2023-01-01 PROCEDURE — 250N000011 HC RX IP 250 OP 636: Performed by: PHYSICIAN ASSISTANT

## 2023-01-01 PROCEDURE — P9016 RBC LEUKOCYTES REDUCED: HCPCS

## 2023-01-01 PROCEDURE — 36415 COLL VENOUS BLD VENIPUNCTURE: CPT

## 2023-01-01 PROCEDURE — 71045 X-RAY EXAM CHEST 1 VIEW: CPT

## 2023-01-01 PROCEDURE — 80048 BASIC METABOLIC PNL TOTAL CA: CPT | Performed by: STUDENT IN AN ORGANIZED HEALTH CARE EDUCATION/TRAINING PROGRAM

## 2023-01-01 PROCEDURE — 87040 BLOOD CULTURE FOR BACTERIA: CPT | Performed by: NURSE PRACTITIONER

## 2023-01-01 PROCEDURE — 250N000009 HC RX 250

## 2023-01-01 PROCEDURE — 83735 ASSAY OF MAGNESIUM: CPT | Performed by: STUDENT IN AN ORGANIZED HEALTH CARE EDUCATION/TRAINING PROGRAM

## 2023-01-01 PROCEDURE — 36415 COLL VENOUS BLD VENIPUNCTURE: CPT | Performed by: STUDENT IN AN ORGANIZED HEALTH CARE EDUCATION/TRAINING PROGRAM

## 2023-01-01 PROCEDURE — 80053 COMPREHEN METABOLIC PANEL: CPT | Performed by: STUDENT IN AN ORGANIZED HEALTH CARE EDUCATION/TRAINING PROGRAM

## 2023-01-01 PROCEDURE — 85025 COMPLETE CBC W/AUTO DIFF WBC: CPT | Performed by: STUDENT IN AN ORGANIZED HEALTH CARE EDUCATION/TRAINING PROGRAM

## 2023-01-01 PROCEDURE — 99233 SBSQ HOSP IP/OBS HIGH 50: CPT | Mod: GC

## 2023-01-01 PROCEDURE — 86923 COMPATIBILITY TEST ELECTRIC: CPT

## 2023-01-01 PROCEDURE — 82270 OCCULT BLOOD FECES: CPT | Performed by: NURSE PRACTITIONER

## 2023-01-01 PROCEDURE — 97112 NEUROMUSCULAR REEDUCATION: CPT | Mod: GO

## 2023-01-01 PROCEDURE — 36415 COLL VENOUS BLD VENIPUNCTURE: CPT | Performed by: HOSPITALIST

## 2023-01-01 PROCEDURE — 99285 EMERGENCY DEPT VISIT HI MDM: CPT | Mod: 25

## 2023-01-01 PROCEDURE — 84484 ASSAY OF TROPONIN QUANT: CPT | Performed by: STUDENT IN AN ORGANIZED HEALTH CARE EDUCATION/TRAINING PROGRAM

## 2023-01-01 PROCEDURE — 250N000011 HC RX IP 250 OP 636

## 2023-01-01 PROCEDURE — 96365 THER/PROPH/DIAG IV INF INIT: CPT | Mod: 59

## 2023-01-01 PROCEDURE — 74174 CTA ABD&PLVS W/CONTRAST: CPT

## 2023-01-01 PROCEDURE — 85018 HEMOGLOBIN: CPT

## 2023-01-01 PROCEDURE — 99449 NTRPROF PH1/NTRNET/EHR 31/>: CPT | Performed by: PSYCHIATRY & NEUROLOGY

## 2023-01-01 PROCEDURE — 99222 1ST HOSP IP/OBS MODERATE 55: CPT | Mod: AI

## 2023-01-01 PROCEDURE — 250N000011 HC RX IP 250 OP 636: Performed by: EMERGENCY MEDICINE

## 2023-01-01 PROCEDURE — 85610 PROTHROMBIN TIME: CPT | Performed by: STUDENT IN AN ORGANIZED HEALTH CARE EDUCATION/TRAINING PROGRAM

## 2023-01-01 PROCEDURE — C9113 INJ PANTOPRAZOLE SODIUM, VIA: HCPCS | Performed by: PHYSICIAN ASSISTANT

## 2023-01-01 PROCEDURE — 258N000003 HC RX IP 258 OP 636: Performed by: NURSE PRACTITIONER

## 2023-01-01 PROCEDURE — 250N000011 HC RX IP 250 OP 636: Performed by: FAMILY MEDICINE

## 2023-01-01 PROCEDURE — 93005 ELECTROCARDIOGRAM TRACING: CPT

## 2023-01-01 PROCEDURE — 85018 HEMOGLOBIN: CPT | Performed by: HOSPITALIST

## 2023-01-01 PROCEDURE — 96361 HYDRATE IV INFUSION ADD-ON: CPT

## 2023-01-01 PROCEDURE — 92610 EVALUATE SWALLOWING FUNCTION: CPT | Mod: GN

## 2023-01-01 PROCEDURE — 36415 COLL VENOUS BLD VENIPUNCTURE: CPT | Performed by: NURSE PRACTITIONER

## 2023-01-01 PROCEDURE — 81001 URINALYSIS AUTO W/SCOPE: CPT | Performed by: STUDENT IN AN ORGANIZED HEALTH CARE EDUCATION/TRAINING PROGRAM

## 2023-01-01 PROCEDURE — 99231 SBSQ HOSP IP/OBS SF/LOW 25: CPT | Mod: GC

## 2023-01-01 PROCEDURE — C9113 INJ PANTOPRAZOLE SODIUM, VIA: HCPCS | Performed by: EMERGENCY MEDICINE

## 2023-01-01 RX ORDER — AMOXICILLIN 250 MG
1 CAPSULE ORAL 2 TIMES DAILY PRN
Status: DISCONTINUED | OUTPATIENT
Start: 2023-01-01 | End: 2023-01-01 | Stop reason: HOSPADM

## 2023-01-01 RX ORDER — SIMVASTATIN 40 MG
40 TABLET ORAL AT BEDTIME
Status: DISCONTINUED | OUTPATIENT
Start: 2023-01-01 | End: 2023-01-01

## 2023-01-01 RX ORDER — PROCHLORPERAZINE 25 MG
12.5 SUPPOSITORY, RECTAL RECTAL EVERY 12 HOURS PRN
Status: DISCONTINUED | OUTPATIENT
Start: 2023-01-01 | End: 2023-01-01 | Stop reason: HOSPADM

## 2023-01-01 RX ORDER — SODIUM CHLORIDE 9 MG/ML
INJECTION, SOLUTION INTRAVENOUS CONTINUOUS
Status: DISCONTINUED | OUTPATIENT
Start: 2023-01-01 | End: 2023-01-01

## 2023-01-01 RX ORDER — NALOXONE HYDROCHLORIDE 0.4 MG/ML
0.2 INJECTION, SOLUTION INTRAMUSCULAR; INTRAVENOUS; SUBCUTANEOUS
Status: DISCONTINUED | OUTPATIENT
Start: 2023-01-01 | End: 2023-01-01 | Stop reason: HOSPADM

## 2023-01-01 RX ORDER — OLANZAPINE 5 MG/1
5 TABLET ORAL DAILY PRN
Status: DISCONTINUED | OUTPATIENT
Start: 2023-01-01 | End: 2023-01-01 | Stop reason: HOSPADM

## 2023-01-01 RX ORDER — NALOXONE HYDROCHLORIDE 0.4 MG/ML
0.4 INJECTION, SOLUTION INTRAMUSCULAR; INTRAVENOUS; SUBCUTANEOUS
Status: DISCONTINUED | OUTPATIENT
Start: 2023-01-01 | End: 2023-01-01 | Stop reason: HOSPADM

## 2023-01-01 RX ORDER — ASPIRIN 325 MG
325 TABLET ORAL DAILY
Status: DISCONTINUED | OUTPATIENT
Start: 2023-01-01 | End: 2023-01-01

## 2023-01-01 RX ORDER — CEFTRIAXONE 1 G/1
1 INJECTION, POWDER, FOR SOLUTION INTRAMUSCULAR; INTRAVENOUS ONCE
Status: COMPLETED | OUTPATIENT
Start: 2023-01-01 | End: 2023-01-01

## 2023-01-01 RX ORDER — LIDOCAINE 40 MG/G
CREAM TOPICAL
Status: DISCONTINUED | OUTPATIENT
Start: 2023-01-01 | End: 2023-01-01 | Stop reason: HOSPADM

## 2023-01-01 RX ORDER — ASPIRIN 81 MG/1
81 TABLET, CHEWABLE ORAL DAILY
Status: DISCONTINUED | OUTPATIENT
Start: 2023-01-01 | End: 2023-01-01 | Stop reason: HOSPADM

## 2023-01-01 RX ORDER — HYDROMORPHONE HCL IN WATER/PF 6 MG/30 ML
0.2 PATIENT CONTROLLED ANALGESIA SYRINGE INTRAVENOUS
Status: DISCONTINUED | OUTPATIENT
Start: 2023-01-01 | End: 2023-01-01 | Stop reason: HOSPADM

## 2023-01-01 RX ORDER — ASPIRIN 81 MG/1
81 TABLET, CHEWABLE ORAL DAILY
Qty: 30 TABLET | Refills: 0 | Status: SHIPPED | OUTPATIENT
Start: 2023-01-01

## 2023-01-01 RX ORDER — METOPROLOL TARTRATE 25 MG/1
25 TABLET, FILM COATED ORAL DAILY
Status: DISCONTINUED | OUTPATIENT
Start: 2023-01-01 | End: 2023-01-01

## 2023-01-01 RX ORDER — CLOPIDOGREL BISULFATE 75 MG/1
75 TABLET ORAL DAILY
Status: DISCONTINUED | OUTPATIENT
Start: 2023-01-01 | End: 2023-01-01

## 2023-01-01 RX ORDER — SIMVASTATIN 40 MG
40 TABLET ORAL AT BEDTIME
Status: ON HOLD | COMMUNITY
End: 2023-01-01

## 2023-01-01 RX ORDER — AMOXICILLIN 250 MG
2 CAPSULE ORAL 2 TIMES DAILY PRN
Status: DISCONTINUED | OUTPATIENT
Start: 2023-01-01 | End: 2023-01-01 | Stop reason: HOSPADM

## 2023-01-01 RX ORDER — PANTOPRAZOLE SODIUM 20 MG/1
40 TABLET, DELAYED RELEASE ORAL
Status: DISCONTINUED | OUTPATIENT
Start: 2023-01-01 | End: 2023-01-01

## 2023-01-01 RX ORDER — IOPAMIDOL 755 MG/ML
75 INJECTION, SOLUTION INTRAVASCULAR ONCE
Status: COMPLETED | OUTPATIENT
Start: 2023-01-01 | End: 2023-01-01

## 2023-01-01 RX ORDER — POLYETHYLENE GLYCOL 3350 17 G/17G
17 POWDER, FOR SOLUTION ORAL DAILY PRN
Status: DISCONTINUED | OUTPATIENT
Start: 2023-01-01 | End: 2023-01-01 | Stop reason: HOSPADM

## 2023-01-01 RX ORDER — OLANZAPINE 10 MG/2ML
5 INJECTION, POWDER, FOR SOLUTION INTRAMUSCULAR DAILY PRN
Status: DISCONTINUED | OUTPATIENT
Start: 2023-01-01 | End: 2023-01-01 | Stop reason: HOSPADM

## 2023-01-01 RX ORDER — PROCHLORPERAZINE MALEATE 5 MG
5 TABLET ORAL EVERY 6 HOURS PRN
Status: DISCONTINUED | OUTPATIENT
Start: 2023-01-01 | End: 2023-01-01 | Stop reason: HOSPADM

## 2023-01-01 RX ORDER — IOPAMIDOL 755 MG/ML
90 INJECTION, SOLUTION INTRAVASCULAR ONCE
Status: COMPLETED | OUTPATIENT
Start: 2023-01-01 | End: 2023-01-01

## 2023-01-01 RX ADMIN — PANTOPRAZOLE SODIUM 40 MG: 20 TABLET, DELAYED RELEASE ORAL at 09:18

## 2023-01-01 RX ADMIN — ASPIRIN 81 MG 81 MG: 81 TABLET ORAL at 09:15

## 2023-01-01 RX ADMIN — MAGNESIUM OXIDE TAB 400 MG (241.3 MG ELEMENTAL MG) 200 MG: 400 (241.3 MG) TAB at 11:41

## 2023-01-01 RX ADMIN — METOPROLOL TARTRATE 25 MG: 25 TABLET, FILM COATED ORAL at 09:06

## 2023-01-01 RX ADMIN — PANTOPRAZOLE SODIUM 40 MG: 20 TABLET, DELAYED RELEASE ORAL at 10:29

## 2023-01-01 RX ADMIN — MAGNESIUM OXIDE TAB 400 MG (241.3 MG ELEMENTAL MG) 200 MG: 400 (241.3 MG) TAB at 07:56

## 2023-01-01 RX ADMIN — MAGNESIUM OXIDE TAB 400 MG (241.3 MG ELEMENTAL MG) 200 MG: 400 (241.3 MG) TAB at 09:06

## 2023-01-01 RX ADMIN — SIMVASTATIN 40 MG: 40 TABLET, FILM COATED ORAL at 19:43

## 2023-01-01 RX ADMIN — MAGNESIUM OXIDE TAB 400 MG (241.3 MG ELEMENTAL MG) 200 MG: 400 (241.3 MG) TAB at 09:51

## 2023-01-01 RX ADMIN — IOPAMIDOL 75 ML: 755 INJECTION, SOLUTION INTRAVENOUS at 22:57

## 2023-01-01 RX ADMIN — PANTOPRAZOLE SODIUM 40 MG: 20 TABLET, DELAYED RELEASE ORAL at 07:53

## 2023-01-01 RX ADMIN — ASPIRIN 81 MG 81 MG: 81 TABLET ORAL at 09:06

## 2023-01-01 RX ADMIN — POLYETHYLENE GLYCOL 3350 17 G: 17 POWDER, FOR SOLUTION ORAL at 09:06

## 2023-01-01 RX ADMIN — HYDROMORPHONE HYDROCHLORIDE 0.2 MG: 0.2 INJECTION, SOLUTION INTRAMUSCULAR; INTRAVENOUS; SUBCUTANEOUS at 20:19

## 2023-01-01 RX ADMIN — SIMVASTATIN 40 MG: 40 TABLET, FILM COATED ORAL at 22:05

## 2023-01-01 RX ADMIN — NEOMYCIN SULFATE, POLYMYXIN B SULFATE, AND DEXAMETHASONE: 3.5; 10000; 1 OINTMENT OPHTHALMIC at 22:20

## 2023-01-01 RX ADMIN — NEOMYCIN SULFATE, POLYMYXIN B SULFATE, AND DEXAMETHASONE: 3.5; 10000; 1 OINTMENT OPHTHALMIC at 22:05

## 2023-01-01 RX ADMIN — NEOMYCIN SULFATE, POLYMYXIN B SULFATE, AND DEXAMETHASONE: 3.5; 10000; 1 OINTMENT OPHTHALMIC at 22:40

## 2023-01-01 RX ADMIN — Medication 1 MG: at 21:25

## 2023-01-01 RX ADMIN — POLYETHYLENE GLYCOL 3350 17 G: 17 POWDER, FOR SOLUTION ORAL at 07:52

## 2023-01-01 RX ADMIN — METOPROLOL TARTRATE 25 MG: 25 TABLET, FILM COATED ORAL at 08:51

## 2023-01-01 RX ADMIN — SODIUM CHLORIDE 1000 ML: 9 INJECTION, SOLUTION INTRAVENOUS at 13:42

## 2023-01-01 RX ADMIN — NEOMYCIN SULFATE, POLYMYXIN B SULFATE, AND DEXAMETHASONE: 3.5; 10000; 1 OINTMENT OPHTHALMIC at 19:42

## 2023-01-01 RX ADMIN — SODIUM CHLORIDE 500 ML: 9 INJECTION, SOLUTION INTRAVENOUS at 14:48

## 2023-01-01 RX ADMIN — ASPIRIN 81 MG 81 MG: 81 TABLET ORAL at 08:00

## 2023-01-01 RX ADMIN — SIMVASTATIN 40 MG: 40 TABLET, FILM COATED ORAL at 01:08

## 2023-01-01 RX ADMIN — NEOMYCIN SULFATE, POLYMYXIN B SULFATE, AND DEXAMETHASONE: 3.5; 10000; 1 OINTMENT OPHTHALMIC at 21:26

## 2023-01-01 RX ADMIN — CEFTRIAXONE 1 G: 1 INJECTION, POWDER, FOR SOLUTION INTRAMUSCULAR; INTRAVENOUS at 13:48

## 2023-01-01 RX ADMIN — ASPIRIN 325 MG ORAL TABLET 325 MG: 325 PILL ORAL at 13:43

## 2023-01-01 RX ADMIN — SIMVASTATIN 40 MG: 40 TABLET, FILM COATED ORAL at 21:25

## 2023-01-01 RX ADMIN — Medication 1 MG: at 02:10

## 2023-01-01 RX ADMIN — IOPAMIDOL 90 ML: 755 INJECTION, SOLUTION INTRAVENOUS at 15:19

## 2023-01-01 RX ADMIN — METOPROLOL TARTRATE 25 MG: 25 TABLET, FILM COATED ORAL at 07:52

## 2023-01-01 RX ADMIN — LIDOCAINE: 40 CREAM TOPICAL at 17:32

## 2023-01-01 RX ADMIN — SODIUM CHLORIDE 1000 ML: 9 INJECTION, SOLUTION INTRAVENOUS at 16:51

## 2023-01-01 RX ADMIN — PANTOPRAZOLE SODIUM 80 MG: 40 INJECTION, POWDER, FOR SOLUTION INTRAVENOUS at 16:50

## 2023-01-01 RX ADMIN — PANTOPRAZOLE SODIUM 40 MG: 20 TABLET, DELAYED RELEASE ORAL at 08:51

## 2023-01-01 RX ADMIN — METOPROLOL TARTRATE 25 MG: 25 TABLET, FILM COATED ORAL at 09:18

## 2023-01-01 RX ADMIN — ASPIRIN 81 MG 81 MG: 81 TABLET ORAL at 09:55

## 2023-01-01 RX ADMIN — MAGNESIUM OXIDE TAB 400 MG (241.3 MG ELEMENTAL MG) 200 MG: 400 (241.3 MG) TAB at 10:29

## 2023-01-01 RX ADMIN — NEOMYCIN SULFATE, POLYMYXIN B SULFATE, AND DEXAMETHASONE: 3.5; 10000; 1 OINTMENT OPHTHALMIC at 21:39

## 2023-01-01 RX ADMIN — MAGNESIUM OXIDE TAB 400 MG (241.3 MG ELEMENTAL MG) 200 MG: 400 (241.3 MG) TAB at 08:51

## 2023-01-01 RX ADMIN — PANTOPRAZOLE SODIUM 40 MG: 20 TABLET, DELAYED RELEASE ORAL at 09:51

## 2023-01-01 RX ADMIN — SIMVASTATIN 40 MG: 40 TABLET, FILM COATED ORAL at 22:03

## 2023-01-01 RX ADMIN — APIXABAN 5 MG: 5 TABLET, FILM COATED ORAL at 13:43

## 2023-01-01 RX ADMIN — MAGNESIUM OXIDE TAB 400 MG (241.3 MG ELEMENTAL MG) 200 MG: 400 (241.3 MG) TAB at 09:18

## 2023-01-01 RX ADMIN — PANTOPRAZOLE SODIUM 40 MG: 20 TABLET, DELAYED RELEASE ORAL at 08:04

## 2023-01-01 RX ADMIN — CLOPIDOGREL BISULFATE 75 MG: 75 TABLET ORAL at 13:43

## 2023-01-01 RX ADMIN — OLANZAPINE 5 MG: 5 TABLET, FILM COATED ORAL at 02:10

## 2023-01-01 RX ADMIN — METOPROLOL TARTRATE 25 MG: 25 TABLET, FILM COATED ORAL at 08:04

## 2023-01-01 RX ADMIN — OLANZAPINE 5 MG: 5 TABLET, FILM COATED ORAL at 19:41

## 2023-01-01 RX ADMIN — PANTOPRAZOLE SODIUM 40 MG: 20 TABLET, DELAYED RELEASE ORAL at 09:06

## 2023-01-01 RX ADMIN — MAGNESIUM OXIDE TAB 400 MG (241.3 MG ELEMENTAL MG) 200 MG: 400 (241.3 MG) TAB at 11:10

## 2023-01-01 RX ADMIN — ASPIRIN 81 MG 81 MG: 81 TABLET ORAL at 10:35

## 2023-01-01 RX ADMIN — METOPROLOL TARTRATE 25 MG: 25 TABLET, FILM COATED ORAL at 10:33

## 2023-01-01 RX ADMIN — SENNOSIDES AND DOCUSATE SODIUM 1 TABLET: 50; 8.6 TABLET ORAL at 09:07

## 2023-01-01 RX ADMIN — PANTOPRAZOLE SODIUM 40 MG: 40 INJECTION, POWDER, FOR SOLUTION INTRAVENOUS at 10:33

## 2023-01-01 RX ADMIN — OLANZAPINE 5 MG: 5 TABLET, FILM COATED ORAL at 02:28

## 2023-01-01 RX ADMIN — SIMVASTATIN 40 MG: 40 TABLET, FILM COATED ORAL at 22:20

## 2023-01-01 RX ADMIN — METOPROLOL TARTRATE 25 MG: 25 TABLET, FILM COATED ORAL at 09:51

## 2023-01-01 RX ADMIN — SENNOSIDES AND DOCUSATE SODIUM 1 TABLET: 50; 8.6 TABLET ORAL at 07:52

## 2023-01-01 RX ADMIN — ASPIRIN 81 MG 81 MG: 81 TABLET ORAL at 10:56

## 2023-01-01 RX ADMIN — POLYETHYLENE GLYCOL 3350 17 G: 17 POWDER, FOR SOLUTION ORAL at 08:04

## 2023-01-01 RX ADMIN — SIMVASTATIN 40 MG: 40 TABLET, FILM COATED ORAL at 21:48

## 2023-01-01 RX ADMIN — SENNOSIDES AND DOCUSATE SODIUM 2 TABLET: 50; 8.6 TABLET ORAL at 17:32

## 2023-01-01 RX ADMIN — ASPIRIN 81 MG 81 MG: 81 TABLET ORAL at 08:51

## 2023-01-01 ASSESSMENT — ACTIVITIES OF DAILY LIVING (ADL)
ADLS_ACUITY_SCORE: 47
ADLS_ACUITY_SCORE: 50
ADLS_ACUITY_SCORE: 47
ADLS_ACUITY_SCORE: 52
ADLS_ACUITY_SCORE: 56
ADLS_ACUITY_SCORE: 35
ADLS_ACUITY_SCORE: 52
ADLS_ACUITY_SCORE: 50
ADLS_ACUITY_SCORE: 54
ADLS_ACUITY_SCORE: 54
ADLS_ACUITY_SCORE: 39
ADLS_ACUITY_SCORE: 35
ADLS_ACUITY_SCORE: 50
ADLS_ACUITY_SCORE: 52
DRESSING/BATHING: BATHING DIFFICULTY, ASSISTANCE 1 PERSON;DRESSING DIFFICULTY, ASSISTANCE 1 PERSON
ADLS_ACUITY_SCORE: 35
ADLS_ACUITY_SCORE: 43
ADLS_ACUITY_SCORE: 50
ADLS_ACUITY_SCORE: 50
DRESS: 0-->ASSISTANCE NEEDED (DEVELOPMENTALLY APPROPRIATE)
ADLS_ACUITY_SCORE: 50
ADLS_ACUITY_SCORE: 40
ADLS_ACUITY_SCORE: 39
DOING_ERRANDS_INDEPENDENTLY_DIFFICULTY: YES
ADLS_ACUITY_SCORE: 56
ADLS_ACUITY_SCORE: 50
ADLS_ACUITY_SCORE: 56
ADLS_ACUITY_SCORE: 40
ADLS_ACUITY_SCORE: 52
WALKING_OR_CLIMBING_STAIRS: AMBULATION DIFFICULTY, ASSISTANCE 1 PERSON
ADLS_ACUITY_SCORE: 50
ADLS_ACUITY_SCORE: 50
ADLS_ACUITY_SCORE: 48
ADLS_ACUITY_SCORE: 36
ADLS_ACUITY_SCORE: 35
ADLS_ACUITY_SCORE: 48
ADLS_ACUITY_SCORE: 56
ADLS_ACUITY_SCORE: 35
ADLS_ACUITY_SCORE: 50
ADLS_ACUITY_SCORE: 54
TRANSFERRING: 1-->ASSISTANCE (EQUIPMENT/PERSON) NEEDED (NOT DEVELOPMENTALLY APPROPRIATE)
ADLS_ACUITY_SCORE: 38
ADLS_ACUITY_SCORE: 56
FALL_HISTORY_WITHIN_LAST_SIX_MONTHS: NO
ADLS_ACUITY_SCORE: 33
DRESS: 1-->ASSISTANCE (EQUIPMENT/PERSON) NEEDED
ADLS_ACUITY_SCORE: 51
ADLS_ACUITY_SCORE: 47
ADLS_ACUITY_SCORE: 35
ADLS_ACUITY_SCORE: 35
ADLS_ACUITY_SCORE: 54
ADLS_ACUITY_SCORE: 56
ADLS_ACUITY_SCORE: 52
DRESSING/BATHING_MANAGEMENT: ASSIST OF ONE
ADLS_ACUITY_SCORE: 56
ADLS_ACUITY_SCORE: 52
ADLS_ACUITY_SCORE: 54
ADLS_ACUITY_SCORE: 35
ADLS_ACUITY_SCORE: 56
ADLS_ACUITY_SCORE: 52
ADLS_ACUITY_SCORE: 35
DRESSING/BATHING_DIFFICULTY: YES
ADLS_ACUITY_SCORE: 36
ADLS_ACUITY_SCORE: 51
ADLS_ACUITY_SCORE: 36
ADLS_ACUITY_SCORE: 56
ADLS_ACUITY_SCORE: 51
ADLS_ACUITY_SCORE: 43
ADLS_ACUITY_SCORE: 50
ADLS_ACUITY_SCORE: 43
DEPENDENT_IADLS:: CLEANING;COOKING;LAUNDRY;SHOPPING;MEAL PREPARATION;MEDICATION MANAGEMENT;TRANSPORTATION
ADLS_ACUITY_SCORE: 35
ADLS_ACUITY_SCORE: 43
ADLS_ACUITY_SCORE: 47
ADLS_ACUITY_SCORE: 36
ADLS_ACUITY_SCORE: 56
ADLS_ACUITY_SCORE: 47
ADLS_ACUITY_SCORE: 56
ADLS_ACUITY_SCORE: 43
ADLS_ACUITY_SCORE: 39
ADLS_ACUITY_SCORE: 47
DIFFICULTY_COMMUNICATING: NO
ADLS_ACUITY_SCORE: 54
CONCENTRATING,_REMEMBERING_OR_MAKING_DECISIONS_DIFFICULTY: YES
ADLS_ACUITY_SCORE: 50
ADLS_ACUITY_SCORE: 43
HEARING_DIFFICULTY_OR_DEAF: NO
ADLS_ACUITY_SCORE: 43
ADLS_ACUITY_SCORE: 48
ADLS_ACUITY_SCORE: 43
ADLS_ACUITY_SCORE: 39
ADLS_ACUITY_SCORE: 47
ADLS_ACUITY_SCORE: 43
ADLS_ACUITY_SCORE: 47
ADLS_ACUITY_SCORE: 54
BATHING: 1-->ASSISTANCE NEEDED
ADLS_ACUITY_SCORE: 56
ADLS_ACUITY_SCORE: 36
ADLS_ACUITY_SCORE: 52
ADLS_ACUITY_SCORE: 50
ADLS_ACUITY_SCORE: 50
ADLS_ACUITY_SCORE: 53
ADLS_ACUITY_SCORE: 56
ADLS_ACUITY_SCORE: 46
ADLS_ACUITY_SCORE: 48
ADLS_ACUITY_SCORE: 52
WALKING_OR_CLIMBING_STAIRS_DIFFICULTY: YES
ADLS_ACUITY_SCORE: 54
ADLS_ACUITY_SCORE: 39
ADLS_ACUITY_SCORE: 54
ADLS_ACUITY_SCORE: 56
ADLS_ACUITY_SCORE: 47
ADLS_ACUITY_SCORE: 50
ADLS_ACUITY_SCORE: 56
ADLS_ACUITY_SCORE: 43
ADLS_ACUITY_SCORE: 56
ADLS_ACUITY_SCORE: 54
ADLS_ACUITY_SCORE: 40
ADLS_ACUITY_SCORE: 56
ADLS_ACUITY_SCORE: 33
ADLS_ACUITY_SCORE: 56
ADLS_ACUITY_SCORE: 38
EQUIPMENT_CURRENTLY_USED_AT_HOME: CANE, STRAIGHT;WALKER, ROLLING
ADLS_ACUITY_SCORE: 50
ADLS_ACUITY_SCORE: 39
ADLS_ACUITY_SCORE: 52
ADLS_ACUITY_SCORE: 40
ADLS_ACUITY_SCORE: 50
ADLS_ACUITY_SCORE: 54
CHANGE_IN_FUNCTIONAL_STATUS_SINCE_ONSET_OF_CURRENT_ILLNESS/INJURY: YES
ADLS_ACUITY_SCORE: 47
ADLS_ACUITY_SCORE: 47
ADLS_ACUITY_SCORE: 35
ADLS_ACUITY_SCORE: 52
ADLS_ACUITY_SCORE: 35
ADLS_ACUITY_SCORE: 47
ADLS_ACUITY_SCORE: 52
ADLS_ACUITY_SCORE: 56
ADLS_ACUITY_SCORE: 56
ADLS_ACUITY_SCORE: 39
ADLS_ACUITY_SCORE: 43
VISION_MANAGEMENT: GLASSES
DIFFICULTY_EATING/SWALLOWING: NO
ADLS_ACUITY_SCORE: 51
ADLS_ACUITY_SCORE: 56
ADLS_ACUITY_SCORE: 47
ADLS_ACUITY_SCORE: 52
ADLS_ACUITY_SCORE: 47
ADLS_ACUITY_SCORE: 50
ADLS_ACUITY_SCORE: 35
ADLS_ACUITY_SCORE: 39
ADLS_ACUITY_SCORE: 47
ADLS_ACUITY_SCORE: 52
ADLS_ACUITY_SCORE: 38
ADLS_ACUITY_SCORE: 35
ADLS_ACUITY_SCORE: 51
ADLS_ACUITY_SCORE: 50
ADLS_ACUITY_SCORE: 35
ADLS_ACUITY_SCORE: 54
ADLS_ACUITY_SCORE: 47
ADLS_ACUITY_SCORE: 36
WEAR_GLASSES_OR_BLIND: YES
ADLS_ACUITY_SCORE: 39
ADLS_ACUITY_SCORE: 40
ADLS_ACUITY_SCORE: 43
ADLS_ACUITY_SCORE: 51
ADLS_ACUITY_SCORE: 36
ADLS_ACUITY_SCORE: 54
ADLS_ACUITY_SCORE: 38
ADLS_ACUITY_SCORE: 47
ADLS_ACUITY_SCORE: 47
TRANSFERRING: 1-->ASSISTANCE (EQUIPMENT/PERSON) NEEDED
ADLS_ACUITY_SCORE: 48
ADLS_ACUITY_SCORE: 43
TOILETING_ISSUES: NO
ADLS_ACUITY_SCORE: 56
ADLS_ACUITY_SCORE: 39
ADLS_ACUITY_SCORE: 53
ADLS_ACUITY_SCORE: 47
ADLS_ACUITY_SCORE: 36
ADLS_ACUITY_SCORE: 56
ADLS_ACUITY_SCORE: 48
ADLS_ACUITY_SCORE: 35
ADLS_ACUITY_SCORE: 52
ADLS_ACUITY_SCORE: 35
ADLS_ACUITY_SCORE: 39
ADLS_ACUITY_SCORE: 39

## 2023-01-09 PROBLEM — A41.9 SEPSIS (H): Status: ACTIVE | Noted: 2023-01-01

## 2023-01-09 PROBLEM — R16.0 LIVER MASS: Status: ACTIVE | Noted: 2023-01-01

## 2023-01-09 PROBLEM — K92.2 GI BLEED: Status: ACTIVE | Noted: 2023-01-01

## 2023-01-09 PROBLEM — K86.89 PANCREATIC MASS: Status: ACTIVE | Noted: 2023-01-01

## 2023-01-09 PROBLEM — Z79.01 ANTICOAGULATED: Status: ACTIVE | Noted: 2023-01-01

## 2023-01-09 PROBLEM — D64.9 ANEMIA, UNSPECIFIED TYPE: Status: ACTIVE | Noted: 2023-01-01

## 2023-01-09 NOTE — ED PROVIDER NOTES
Emergency Department Midlevel Supervisory Note     I personally saw the patient and performed a substantive portion of the visit including all aspects of the medical decision making.    ED Course:  2:18 PM Shadi Quick CNP staffed patient with me. I agree with their assessment and plan of management, and I will see the patient.  2:26 PM I met with the patient to introduce myself, gather additional history, perform my initial exam, and discuss the plan.     Brief HPI:     Son Huizar is a 87 year old male who presents for evaluation of increased weakness and confusion over the past couple of days. No recent falls. Anticoagulated on Eliquis. Guaiac positive stool and pale appearing. No other medical complaints at this time.     The creation of this record is based on the scribe s observations of the work being performed by Jody Gonzalez MD and the provider s statements to them. It was created on her behalf by Naz Rangel, a trained medical scribe. This document has been checked and approved by the attending provider.    Brief Physical Exam:  Constitutional:  Alert, in no acute distress, Pale  EYES: Conjunctivae clear  HENT:  Atraumatic, normocephalic  Respiratory:  Respirations even, unlabored, in no acute respiratory distress  Cardiovascular:  Regular rate and rhythm, 2+ radial pulses bilaterally, 1+ DP pulses bilaterally  GI: Soft, nondistended, nontender, no palpable masses, no rebound, no guarding   Musculoskeletal:  No edema. No cyanosis. Range of motion major extremities intact.    Integument: Warm, Dry, No erythema, No rash.   Neurologic:  Alert & oriented to self, no focal deficits noted  Psych: Normal mood and affect     MDM:  I evaluated the patient after he was initially worked up by the [facility].  Work-up was concerning for possible GI bleed with guaiac positive stool and a hemoglobin that is 7.9 with prior being 15 a year ago.  White blood cell count is also elevated at 21.4.  He had been given IV  Rocephin.  No definite infectious symptoms.  CT of the abdomen pelvis as well as chest x-ray were obtained.  Also advised type and screen and preparing 2 units of blood.  Blood pressure slightly soft however improved with IV fluids.  Was given 80 mg of IV Protonix and continued IV fluids.  Question potential of sepsis as well however no definite infectious source.  Lactic acid slightly elevated at 2.1.  Also considered bowel ischemia and CTA was ordered.     Head CT without acute pathology.  Has no focal deficits on my exam.  Chest x-ray reveals small left and trace right pleural effusions.  His BNP is slightly elevated in the 300s.  Increased interstitial markings within the right greater than the left mid to lower lungs appear similar to prior CT and may reflect interstitial edema.  No new airspace opacities.  Unchanged cardiomegaly.  No pneumothorax.  CT revealed no definite evidence of active GI bleeding.  There is innumerable peripherally enhancing masses throughout the liver new from August 2022 and highly suspicious for malignancy.  Soft tissue fullness in the pancreatic head measuring approximately 3 cm.  This is indeterminate and could represent a primary pancreatic malignancy or metastasis.  This finding is new from 2020.  Diffuse.  Periodic calcification consistent with chronic pancreatitis.  Small right pleural effusion rounded atelectasis in the right lower lobe unchanged.  The [facility] discussed the results with the patient and his daughter.  Plan for admission for further management.  They are in agreement with this plan.  No obvious infectious source at this time with only RBC in urine, urine culture pending. Blood cultures pending. The patient was accepted by the hospitalist in stable condition.        1. Anemia, unspecified type    2. GI bleed    3. Anticoagulated    4. Sepsis (H)    5. Liver mass    6. Pancreatic mass        Labs and Imaging:  Results for orders placed or performed during the hospital  encounter of 01/09/23   Head CT w/o contrast    Impression    IMPRESSION:  1.  No acute intracranial hemorrhage.  2.  Chronic lacunar type infarcts involving the left thalamus and posterior limb of the right internal capsule.  3.  Cerebral volume loss more pronounced in the temporal lobes, a finding which can be seen with Alzheimer's dementia.  4.  Moderate burden presumed sequela of chronic small vessel ischemic disease.   CTA Abdomen Pelvis with Contrast    Impression    IMPRESSION:  1.  The study is significantly limited by contrast extravasation during the injection with reduced opacification of the arterial system. No definite evidence of active GI bleeding.  2.  There are innumerable peripherally enhancing masses throughout the liver measuring up to 3.0 cm in diameter. These are new from 8/17/2022 and are highly suspicious for malignancy. The pattern is atypical for lymphoma and other etiologies should be   considered.  3.  There is soft tissue fullness in the pancreatic head measuring approximately 3 cm. This is indeterminate and could represent a primary pancreatic malignancy or metastasis. This finding is new from 2020 and not adequately imaged on the most recent   chest CT. An MRI of the pancreas without and with IV gadolinium can be considered for further evaluation.  4.  Diffuse pancreatic calcification consistent with chronic pancreatitis.  5.  Small right pleural effusion and rounded atelectasis in the right lower lobe are unchanged from 8/17/2022.   XR Chest Port 1 View    Impression    IMPRESSION: Small left and trace right pleural effusions. Low lung volumes. Increased interstitial markings within the right greater than left mid to lower lungs appear similar to the prior CT and may reflect interstitial edema. No new airspace   opacities. Unchanged cardiomegaly. No pneumothorax.   Comprehensive metabolic panel   Result Value Ref Range    Sodium 138 136 - 145 mmol/L    Potassium 4.0 3.5 - 5.0 mmol/L     Chloride 103 98 - 107 mmol/L    Carbon Dioxide (CO2) 23 22 - 31 mmol/L    Anion Gap 12 5 - 18 mmol/L    Urea Nitrogen 24 8 - 28 mg/dL    Creatinine 1.02 0.70 - 1.30 mg/dL    Calcium 9.5 8.5 - 10.5 mg/dL    Glucose 192 (H) 70 - 125 mg/dL    Alkaline Phosphatase 205 (H) 45 - 120 U/L    AST 29 0 - 40 U/L    ALT 21 0 - 45 U/L    Protein Total 6.7 6.0 - 8.0 g/dL    Albumin 2.9 (L) 3.5 - 5.0 g/dL    Bilirubin Total 0.8 0.0 - 1.0 mg/dL    GFR Estimate 72 >60 mL/min/1.73m2   Result Value Ref Range    Troponin I 0.09 0.00 - 0.29 ng/mL   B-Type Natriuretic Peptide ([facility] Only)   Result Value Ref Range     (H) 0 - 93 pg/mL   UA with Microscopic reflex to Culture    Specimen: Urine, Catheter   Result Value Ref Range    Color Urine Yellow Colorless, Straw, Light Yellow, Yellow    Appearance Urine Clear Clear    Glucose Urine Negative Negative mg/dL    Bilirubin Urine Negative Negative    Ketones Urine Negative Negative mg/dL    Specific Gravity Urine 1.028 1.001 - 1.030    Blood Urine Negative Negative    pH Urine 5.5 5.0 - 7.0    Protein Albumin Urine 50 (A) Negative mg/dL    Urobilinogen Urine 2.0 (A) <2.0 mg/dL    Nitrite Urine Negative Negative    Leukocyte Esterase Urine Negative Negative    Mucus Urine Present (A) None Seen /LPF    RBC Urine 17 (H) <=2 /HPF    WBC Urine 1 <=5 /HPF   Result Value Ref Range    Magnesium 1.8 1.8 - 2.6 mg/dL   CBC with platelets and differential   Result Value Ref Range    WBC Count 21.4 (H) 4.0 - 11.0 10e3/uL    RBC Count 3.33 (L) 4.40 - 5.90 10e6/uL    Hemoglobin 7.9 (L) 13.3 - 17.7 g/dL    Hematocrit 26.4 (L) 40.0 - 53.0 %    MCV 79 78 - 100 fL    MCH 23.7 (L) 26.5 - 33.0 pg    MCHC 29.9 (L) 31.5 - 36.5 g/dL    RDW 16.5 (H) 10.0 - 15.0 %    Platelet Count 362 150 - 450 10e3/uL    % Neutrophils 86 %    % Lymphocytes 3 %    % Monocytes 10 %    % Eosinophils 0 %    % Basophils 0 %    % Immature Granulocytes 1 %    NRBCs per 100 WBC 0 <1 /100    Absolute Neutrophils 18.4 (H) 1.6 -  8.3 10e3/uL    Absolute Lymphocytes 0.6 (L) 0.8 - 5.3 10e3/uL    Absolute Monocytes 2.0 (H) 0.0 - 1.3 10e3/uL    Absolute Eosinophils 0.0 0.0 - 0.7 10e3/uL    Absolute Basophils 0.0 0.0 - 0.2 10e3/uL    Absolute Immature Granulocytes 0.2 <=0.4 10e3/uL    Absolute NRBCs 0.0 10e3/uL   Lactic acid whole blood   Result Value Ref Range    Lactic Acid 2.1 (H) 0.7 - 2.0 mmol/L   Occult blood stool 1-3 spec   Result Value Ref Range    Occult Blood Slide 1 Positive (A) Negative   Lactic acid whole blood   Result Value Ref Range    Lactic Acid 2.4 (H) 0.7 - 2.0 mmol/L   ECG 12-LEAD WITH MUSE ([facility])   Result Value Ref Range    Systolic Blood Pressure 103 mmHg    Diastolic Blood Pressure 63 mmHg    Ventricular Rate 98 BPM    Atrial Rate 97 BPM    CA Interval  ms    QRS Duration 148 ms     ms    QTc 503 ms    P Axis  degrees    R AXIS -85 degrees    T Axis 23 degrees    Interpretation ECG       Atrial fibrillation with premature ventricular or aberrantly conducted complexes  Right bundle branch block  Left anterior fascicular block  Bifascicular block   Cannot rule out Inferior infarct (cited on or before 13-JAN-2022)  Anterior infarct , age undetermined  Abnormal ECG  When compared with ECG of 13-JAN-2022 15:26,  Atrial fibrillation has replaced Sinus rhythm  (RBBB and left anterior fascicular block) is now Present  Anterior infarct is now Present  Confirmed by SEE ED PROVIDER NOTE FOR, ECG INTERPRETATION ([id number]),  REKHA LANCE ([id number]) on 1/9/2023 1:00:25 PM     Adult Type and Screen   Result Value Ref Range    ABO/RH(D) A POS     Antibody Screen Negative Negative    SPECIMEN EXPIRATION DATE [id number]      I have reviewed the relevant laboratory and radiology studies    Procedures:  I was present for the key portions of this procedure: none    Jody Gonzalez MD  [facility] EMERGENCY ROOM  [address] [address] [phone]     Jody Gonzalez,  MD  01/09/23 3087

## 2023-01-09 NOTE — PHARMACY-ADMISSION MEDICATION HISTORY
Pharmacy Note - Admission Medication History    Pertinent Provider Information:      ______________________________________________________________________    Prior To Admission (PTA) med list completed and updated in EMR.       PTA Med List   Medication Sig Last Dose     alpha lipoic acid 100 mg cap [ALPHA LIPOIC ACID 100 MG CAP] Take 100 mg by mouth daily. 1/8/2023     ascorbic acid, vitamin C, (ASCORBIC ACID WITH NATHALIE HIPS) 500 MG tablet [ASCORBIC ACID, VITAMIN C, (ASCORBIC ACID WITH NATHALIE HIPS) 500 MG TABLET] Take 500 mg by mouth daily. 1/8/2023     b complex vitamins tablet [B COMPLEX VITAMINS TABLET] Take 1 tablet by mouth daily. B Complex 50 Tablet (B Complex Vitamins) Give 1  tablet by mouth one time a day for Supplement        1/8/2023     cholecalciferol, vitamin D3, 1,000 unit tablet [CHOLECALCIFEROL, VITAMIN D3, 1,000 UNIT TABLET] Take 2,000 Units by mouth daily. 1/8/2023     clopidogrel (PLAVIX) 75 MG tablet Take 75 mg by mouth daily 1/9/2023     coenzyme Q10 (CO Q-10) 100 mg capsule [COENZYME Q10 (CO Q-10) 100 MG CAPSULE] Take 100 mg by mouth daily. 1/8/2023     ELIQUIS 5 mg Tab tablet [ELIQUIS 5 MG TAB TABLET] TAKE 1 TABLET BY MOUTH TWICE DAILY 1/9/2023 at am     fluticasone (FLONASE) 50 mcg/actuation nasal spray [FLUTICASONE (FLONASE) 50 MCG/ACTUATION NASAL SPRAY] Apply 1 spray into each nostril daily as needed.        1/8/2023     lactobacillus rhamnosus, GG, (CULTURELL) capsule Take 1 capsule by mouth daily 1/8/2023     levOCARNitine 500 mg Tab Take 500 mg by mouth daily as needed 1/8/2023     magnesium 200 mg Tab [MAGNESIUM 200 MG TAB] Take 200 mg by mouth daily. 1/8/2023     metoprolol tartrate (LOPRESSOR) 25 MG tablet [METOPROLOL TARTRATE (LOPRESSOR) 25 MG TABLET] Take 25 mg by mouth daily.        1/9/2023     Multiple Vitamins-Minerals (PRESERVISION AREDS 2+MULTI VIT PO) Take 1 capsule by mouth 2 times daily 1/8/2023     neomycin-polymixin-dexamethasone (MAXITROL) ophthalmic ointment Place into  both eyes At Bedtime 1/8/2023     omega-3/dha/epa/fish oil (FISH OIL-OMEGA-3 FATTY ACIDS) 300-1,000 mg capsule Take 2 g by mouth daily 1/8/2023     peg 400-propylene glycol PF (SYSTANE) 0.4-0.3 % Dpet [-PROPYLENE GLYCOL PF (SYSTANE) 0.4-0.3 % DPET] Administer 1 drop to both eyes 4 (four) times a day as needed. 1/8/2023     SAW PALMETTO ORAL Take 1 capsule by mouth daily  1/8/2023     simvastatin (ZOCOR) 40 MG tablet Take 40 mg by mouth At Bedtime 1/8/2023     zinc gluconate 30 mg Tab Take 30 mg by mouth daily as needed  1/8/2023       Information source(s): Patient, Family member and CareEverywhere/SureScripts---daughter, Maddi  Method of interview communication: in-person    Summary of Changes to PTA Med List  New: simvastatin  Discontinued: none  Changed: none    Patient was asked about OTC/herbal products specifically.  PTA med list reflects this.    In the past week, patient estimated taking medication this percent of the time:  greater than 90%.    Allergies were reviewed, assessed, and updated with the patient.      Patient did not bring any medications to the hospital and can't retrieve from home. No multi-dose medications are available for use during hospital stay.     The information provided in this note is only as accurate as the sources available at the time of the update(s).    Thank you for the opportunity to participate in the care of this patient.    Dany Marx RP  1/9/2023 2:12 PM

## 2023-01-09 NOTE — ED TRIAGE NOTES
Pt presents to the ED from private home with wife, pts daughter present. Daughter reports hx of confusion, and urinary incontinence that started on Thursday. No falls. Pt on blood thinners for Afib and hx of stroke.      Triage Assessment     Row Name 01/09/23 1014       Triage Assessment (Adult)    Airway WDL WDL       Respiratory WDL    Respiratory WDL WDL       Skin Circulation/Temperature WDL    Skin Circulation/Temperature WDL WDL       Cardiac WDL    Cardiac WDL WDL       Peripheral/Neurovascular WDL    Peripheral Neurovascular WDL WDL       Cognitive/Neuro/Behavioral WDL    Cognitive/Neuro/Behavioral WDL WDL

## 2023-01-09 NOTE — ED PROVIDER NOTES
EMERGENCY DEPARTMENT ENCOUNTER      NAME: Son Huizar  AGE: 87 year old male  YOB: 1935  MRN: [id number]  EVALUATION DATE & TIME: 2023 11:56 AM    PCP: Garcia Dahl    ED PROVIDER: ROBERT Sibley, CNP      Chief Complaint   Patient presents with     Altered Mental Status     Generalized Weakness         FINAL IMPRESSION:  1. Anemia, unspecified type    2. GI bleed    3. Anticoagulated    4. Sepsis (H)    5. Liver mass    6. Pancreatic mass          ED COURSE & MEDICAL DECISION MAKIN:58 AM I met with the patient, obtained history, performed an initial exam, and discussed options and plan for treatment here in the ED.  1:48 PM critical result: Hemoccult positive  1:58 PM updated patient and family  2:22 PM case staffed with Dr Gonzalez  4:49 PM updated patient and family. Also discussed case with the admitting resident.    Pertinent Labs & Imaging studies reviewed. (See chart for details)  87 year old male presents to the Emergency Department for evaluation of increasing weakness and confusion. Alert and oriented on my evaluation. No focal weakness on exam. Did appear quite pale. HGB returned at 7.9. on eliquis. Rectal exam performed - dark brown stool. No obvious blood though hemocult positive. Given 80 mg IV protonix. CTA of the abdomen and pelvis performed -suboptimal due to extravasation contrast in the left arm.  No active bleeding was noted.  There were liver and pancreatic masses concerning for malignancy. CBC also notable for leukocytosis. Lactate 2.1. concern for infection - initially suspected urinary source. UA pending at the time of admission. Ordered IV rocephin.  Will be admitted for suspected GI bleed, weakness, new liver and pancreatic masses along with sepsis with suspected UTI.    Medical Decision Making    History:    Supplemental history from: Documented in HPI, if applicable    External Record(s) reviewed: Documented in HPI, if applicable.    Work  Up:    Chart documentation includes differential considered and any EKGs or imaging independently interpreted by provider.    In additional to work up documented, I considered the following work up: See chart documentation, if applicable.    External consultation:    Discussion of management with another provider: See chart documentation, if applicable and Hospitalist    Complicating factors:    Care impacted by chronic illness: Cerebrovascular Disease and Other: chronic a fib    Care affected by social determinants of health: N/A    Disposition considerations: Admit.        At the conclusion of the encounter I discussed the results of all of the tests and the disposition. The questions were answered. The patient or family acknowledged understanding and was agreeable with the care plan.       MEDICATIONS GIVEN IN THE EMERGENCY:  Medications   cefTRIAXone (ROCEPHIN) 1 g vial to attach to  mL bag for ADULTS or NS 50 mL bag for PEDS (0 g Intravenous Stopped 1/9/23 1451)   0.9% sodium chloride BOLUS (0 mLs Intravenous Stopped 1/9/23 1451)   0.9% sodium chloride BOLUS (0 mLs Intravenous Stopped 1/9/23 1545)   pantoprazole (PROTONIX) IV push injection 80 mg (80 mg Intravenous Given 1/9/23 1650)   iopamidol (ISOVUE-370) solution 90 mL (90 mLs Intravenous Given 1/9/23 1519)   0.9% sodium chloride BOLUS (1,000 mLs Intravenous New Bag 1/9/23 1651)       NEW PRESCRIPTIONS STARTED AT TODAY'S ER VISIT  New Prescriptions    No medications on file            =================================================================    Patient information was obtained from: patient, daughter    Use of Intrepreter: N/A    Limitations to History: None  HPI    Son Huizar is a 87 year old male with a history of multiple medical problems as documented below who presents today for evaluation of increased weakness and confusion.  Brought from home by his daughter.  Daughter noted increased symptoms over the past couple of days.  Reports  generalized weakness.  She thinks maybe he has looked jaundiced also.  Is on Eliquis.  No recent falls.  Patient endorses generalized weakness.  Denies any focal weakness.  Also, no headache, chest discomfort, difficulty breathing, fever, chills, abdominal pain, nausea, vomiting, dysuria, or black or bloody stools.  Daughter is not aware of any bleeding history on Eliquis.  No additional complaints or concerns      PAST MEDICAL HISTORY:  Past Medical History:   Diagnosis Date     Aortic valve insufficiency 7/11/2015    Mild-moderate AI echo July 2015      Arthritis 2010    hands     Atrial fibrillation (H) 2015     Atrial flutter (H) 07/10/2015     Cancer (H)      Essential hypertension 2014     Hematuria 07/sep/2007    unknown per pt     Hyperlipidemia 4/13/2018     Left atrial thrombus 5/3/2016    MELONY 3/9/2016      Lymphoma (H)      Macular degeneration 2015     Persistent atrial fibrillation (H) 7/24/2015     Stroke (H) 03/04/2014    acute L Thalmic stroke     Thalamic infarction (H) 9/14/2018     Thrombus of right atrial appendage     per H&P       PAST SURGICAL HISTORY:  Past Surgical History:   Procedure Laterality Date     ARTHROSCOPY SHOULDER ROTATOR CUFF REPAIR Left 2009    one     ARTHROSCOPY SHOULDER ROTATOR CUFF REPAIR Right 2008    two times     CATARACT EXTRACTION Bilateral 2015     HERNIA REPAIR  2015     HERNIORRHAPHY INGUINAL Right 8/13/2021    Procedure: RIGHT INGUINAL HERNIA REPAIR;  Surgeon: Scott Hunter MD;  Location: [facility] Main OR     IR CHEST PORT PLACEMENT > 5 YRS OF AGE  1/14/2020     IR PORT PLACEMENT >5 YEARS  1/14/2020     IR PORT REMOVAL  6/19/2020     IR PORT REMOVAL  6/19/2020     KNEE SURGERY Left 1980     NEUROPLASTY / TRANSPOSITION MEDIAN NERVE AT CARPAL TUNNEL BILATERAL Bilateral 2007     US THORACENTESIS  12/6/2019           CURRENT MEDICATIONS:    No current facility-administered medications for this encounter.     Current Outpatient Medications   Medication     alpha  lipoic acid 100 mg cap     ascorbic acid, vitamin C, (ASCORBIC ACID WITH NATHALIE HIPS) 500 MG tablet     b complex vitamins tablet     cholecalciferol, vitamin D3, 1,000 unit tablet     clopidogrel (PLAVIX) 75 MG tablet     coenzyme Q10 (CO Q-10) 100 mg capsule     ELIQUIS 5 mg Tab tablet     fluticasone (FLONASE) 50 mcg/actuation nasal spray     lactobacillus rhamnosus, GG, (CULTURELL) capsule     levOCARNitine 500 mg Tab     magnesium 200 mg Tab     metoprolol tartrate (LOPRESSOR) 25 MG tablet     Multiple Vitamins-Minerals (PRESERVISION AREDS 2+MULTI VIT PO)     neomycin-polymixin-dexamethasone (MAXITROL) ophthalmic ointment     omega-3/dha/epa/fish oil (FISH OIL-OMEGA-3 FATTY ACIDS) 300-1,000 mg capsule     peg 400-propylene glycol PF (SYSTANE) 0.4-0.3 % Dpet     SAW PALMETTO ORAL     simvastatin (ZOCOR) 40 MG tablet     zinc gluconate 30 mg Tab         ALLERGIES:  Allergies   Allergen Reactions     Pollen [Pollen Extract] Itching     Runny nose, sneezing, itchy eyes      Other Environmental Allergy Itching     Running nose. Itchy eye, DUST     Ragweed [Ragweeds] Itching     Running nose, itchy eyes     Yeast, Dried [Yeast] Headache     brewrys yeast only         VITALS:  Patient Vitals for the past 24 hrs:   BP Temp Temp src Pulse Resp SpO2 Weight   01/09/23 1516 -- -- -- 88 15 -- --   01/09/23 1515 102/54 -- -- 90 -- -- --   01/09/23 1433 107/65 -- -- 82 25 96 % --   01/09/23 1400 95/57 -- -- 78 25 100 % --   01/09/23 1350 90/57 -- -- 78 20 99 % --   01/09/23 1230 103/63 -- -- 90 -- 100 % --   01/09/23 1016 97/59 99.8  F (37.7  C) Temporal 106 18 99 % 73.5 kg (162 lb)       PHYSICAL EXAM    Constitutional: Alert, no distress.  Frail-appearing  EYES: Conjunctivae clear  HENT:  Atraumatic, normocephalic  Respiratory:  No respiratory distress, normal breath sounds  Cardiovascular: Normal rate, irregularly irregular rhythm.  Abdomen:  Soft. Non tender. No rectal masses. Dark brown stool on JASVIR.  Musculoskeletal:  No  edema.  Integument: Appears pale. Skin warm and dry.  Neurologic:  Alert & oriented x 3.  Cranial nerves III through XII grossly intact.  5/5 bilateral , triceps, biceps, hip flexors.  Sensation intact with light touch in the upper and lower extremities              LAB:  All pertinent labs reviewed and interpreted.  Labs Ordered and Resulted from Time of ED Arrival to Time of ED Departure   COMPREHENSIVE METABOLIC PANEL - Abnormal       Result Value    Sodium 138      Potassium 4.0      Chloride 103      Carbon Dioxide (CO2) 23      Anion Gap 12      Urea Nitrogen 24      Creatinine 1.02      Calcium 9.5      Glucose 192 (*)     Alkaline Phosphatase 205 (*)     AST 29      ALT 21      Protein Total 6.7      Albumin 2.9 (*)     Bilirubin Total 0.8      GFR Estimate 72     B-TYPE NATRIURETIC PEPTIDE ( EAST ONLY) - Abnormal     (*)    CBC WITH PLATELETS AND DIFFERENTIAL - Abnormal    WBC Count 21.4 (*)     RBC Count 3.33 (*)     Hemoglobin 7.9 (*)     Hematocrit 26.4 (*)     MCV 79      MCH 23.7 (*)     MCHC 29.9 (*)     RDW 16.5 (*)     Platelet Count 362      % Neutrophils 86      % Lymphocytes 3      % Monocytes 10      % Eosinophils 0      % Basophils 0      % Immature Granulocytes 1      NRBCs per 100 WBC 0      Absolute Neutrophils 18.4 (*)     Absolute Lymphocytes 0.6 (*)     Absolute Monocytes 2.0 (*)     Absolute Eosinophils 0.0      Absolute Basophils 0.0      Absolute Immature Granulocytes 0.2      Absolute NRBCs 0.0     LACTIC ACID WHOLE BLOOD - Abnormal    Lactic Acid 2.1 (*)    OCCULT BLOOD STOOL 1-3 SPEC - Abnormal    Occult Blood Slide 1 Positive (*)    TROPONIN I - Normal    Troponin I 0.09     MAGNESIUM - Normal    Magnesium 1.8     ROUTINE UA WITH MICROSCOPIC REFLEX TO CULTURE   LACTIC ACID WHOLE BLOOD   TYPE AND SCREEN, ADULT    ABO/RH(D) A POS      Antibody Screen Negative      SPECIMEN EXPIRATION DATE [id number]     BLOOD CULTURE   BLOOD CULTURE   ABO/RH TYPE AND SCREEN          RADIOLOGY:  Reviewed all pertinent imaging. Please see official radiology report.  CTA Abdomen Pelvis with Contrast   Final Result   IMPRESSION:   1.  The study is significantly limited by contrast extravasation during the injection with reduced opacification of the arterial system. No definite evidence of active GI bleeding.   2.  There are innumerable peripherally enhancing masses throughout the liver measuring up to 3.0 cm in diameter. These are new from 8/17/2022 and are highly suspicious for malignancy. The pattern is atypical for lymphoma and other etiologies should be    considered.   3.  There is soft tissue fullness in the pancreatic head measuring approximately 3 cm. This is indeterminate and could represent a primary pancreatic malignancy or metastasis. This finding is new from 2020 and not adequately imaged on the most recent    chest CT. An MRI of the pancreas without and with IV gadolinium can be considered for further evaluation.   4.  Diffuse pancreatic calcification consistent with chronic pancreatitis.   5.  Small right pleural effusion and rounded atelectasis in the right lower lobe are unchanged from 8/17/2022.      XR Chest Port 1 View   Final Result   IMPRESSION: Small left and trace right pleural effusions. Low lung volumes. Increased interstitial markings within the right greater than left mid to lower lungs appear similar to the prior CT and may reflect interstitial edema. No new airspace    opacities. Unchanged cardiomegaly. No pneumothorax.      Head CT w/o contrast   Final Result   IMPRESSION:   1.  No acute intracranial hemorrhage.   2.  Chronic lacunar type infarcts involving the left thalamus and posterior limb of the right internal capsule.   3.  Cerebral volume loss more pronounced in the temporal lobes, a finding which can be seen with Alzheimer's dementia.   4.  Moderate burden presumed sequela of chronic small vessel ischemic disease.          EKG Interpretation  1/9/2023  at 12:45 PM    Rhythm: a-fib  Rate: 98 BPM  Axis: normal  Ectopy: none  Conduction: Bifascicular block.  QTc 503 ms  ST Segments: no acute change  T Waves: no acute change    Clinical Impression: A. fib with ventricular rate of 98 bpm.  Bifascicular block.  Prolonged QTc interval.    I have independentlyreviewed and interpreted the patient's EKG with comments made as listed above.  Please see scanned image for full interpretation      PROCEDURES:   None    ROBERT Sibley, CNP  Emergency Services  [facility] EMERGENCY ROOM  [address] [place] [id number]  [phone]  Dept: [phone]         Shadi Quick APRN CNP  01/09/23 7746

## 2023-01-09 NOTE — ED TRIAGE NOTES
Family wanting help for resources for TCU or care.      Triage Assessment     Row Name 01/09/23 1014       Triage Assessment (Adult)    Airway WDL WDL       Respiratory WDL    Respiratory WDL WDL       Skin Circulation/Temperature WDL    Skin Circulation/Temperature WDL WDL       Cardiac WDL    Cardiac WDL WDL       Peripheral/Neurovascular WDL    Peripheral Neurovascular WDL WDL       Cognitive/Neuro/Behavioral WDL    Cognitive/Neuro/Behavioral WDL WDL

## 2023-01-10 NOTE — H&P
[facility]    History and Physical - Hospitalist Service       Date of Admission:  1/9/2023    Assessment & Plan      Son Huizar is a 87 year old male with a past medical history of atrial fibrillation, essential hypertension, hyperlipidemia, lymphoma, CVA, and thalamic infarction.  He is admitted on 1/9/2023 for increased weakness and confusion.  He is newly diagnosed with anemia.  There is high suspicion of GI bleed while on anticoagulation due to positive Hemoccult test.    Anemia  GI bleed, unspecified  Anticoagulated due to atrial fibrillation  Hemoglobin is 7.9 on admission.  This was 15.8 one year ago.  Hemoccult positive.  It is possible lactic acid is elevated due to acute blood loss. Blood transfusion could help lactic acid and hemoglobin.  Daughter verbally agreed to blood transfusion if needed tonight.  - Hold Plavix and Eliquis  - GI consultation  - NPO midnight for possible procedure  - Hgb checks every 8 hours    Sepsis  Patient oriented to self only.  Elevated white blood count to 21.4.  Patient given 2500 mL bolus.  Lactic acid is 2.4 on recheck.  Chest x-ray and CTA of abdomen and pelvis showing no signs of infection.  UA showing no infection.  Given 1 g ceftriaxone in the emergency department.  It is possible that WBC elevation is due to lymphoma.   - Blood cultures pending    Confusion  According to daughter, patient at baseline is oriented to person, place, and time.  On admission, patient is oriented only to self.  Differential includes infection vs prior CVAs vs developing Alzheimer's disease vs progression of cancer vs acute blood loss. Likely multifactorial.    Diffuse large B cell lymphoma  Liver mass, newly diagnosed  Pancreatic mass, newly diagnosed  Follows with [facility], Dr. Ross.  Soft tissue fullness in the pancreatic head measuring approximately 3 cm.  Innumerable peripheral enhancing masses throughout the liver measuring up to 3 cm.  Both are highly  suspicious of malignancy.  Per CTA of abdominal and pelvis read, it is recommended that an MRI of the pancreas without and with IV gadolinium be used for further evaluation.  - Follow-up outpatient with [facility] provider, Dr. Ross  - May consider inpatient oncology referral    Atrial fibrillation  History of CVA and thalamic infarction  CT head without contrast showing chronic lacunar type infarcts involving the thalamus and posterior limb of the right internal capsule, cerebral volume loss which is consistent with Alzheimer's dementia, and moderate chronic small vessel ischemic disease.  - Continue metoprolol tartrate 25 mg daily  - Cardiac telemetry  - Holding Plavix and Eliquis due to possible GI bleed    Hyperlipidemia  - Continue simvastatin 40 mg at bedtime    Chronic hypomagnesemia  On admission, magnesium is 1.8.  - PTA magnesium 200 mg daily    Placement on discharge  Patient lives at home with wife.  According to daughter, this may be too difficult for patient's wife to take care of patient.  She requests looking into a TCU and possible long-term living facilities.  - Care management consult        Diet: NPO per Anesthesia Guidelines for Procedure/Surgery Except for: Meds  DVT Prophylaxis: Pneumatic Compression Devices  Ghotra Catheter: Not present  Fluids: PO  Lines: None     Cardiac Monitoring: ACTIVE order. Indication: Tachyarrhythmias, acute (48 hours)  Code Status: Full Code         # Hypercalcemia: corrected calcium is >10.1, will monitor as appropriate    # Hypoalbuminemia: Lowest albumin = 2.9 g/dL at 1/9/2023 12:30 PM, will monitor as appropriate                  Disposition Plan      Expected Discharge Date: 01/11/2023                The patient's care was discussed with the Attending Physician, Dr. Gracie Michelle. Patient will be seen by attending provider, Dr. Abelardo Alvarez, in the morning..      Marisela Scott MD  Hospitalist Service  [facility]  Securely message  with Corina (more info)  Text page via [facility] Paging/Directory   ______________________________________________________________________    Chief Complaint   Increased weakness and confusion for about 2 weeks    History is obtained from the patient and daughter, Maddi Garber.    History of Present Illness   Son Huizar is a 87 year old male with a past medical history of atrial fibrillation, essential hypertension, hyperlipidemia, lymphoma, CVA, and thalamic infarction.  He is admitted on 1/9/2023 for increased weakness and confusion.  He is newly diagnosed with anemia.  There is high suspicion of GI bleed while on anticoagulation due to positive Hemoccult test.    Discussed patient with Maddi, daughter, over the phone.  Daughter states that patient started to have noticeable confusion starting on 12/24/2022.  Before this time, patient was oriented to person, place, and time.  Daughter denies patient ever being sick between then and now.  Starting on 1/9/2023, patient began to develop urinary incontinence.    Patient lives with wife in their own home.  Over this last week, patient has needed a walker for ambulation and his wife to help with pills.  Pills are set up in a pillbox weekly.  Daughter states it may be too difficult to have her father return home with her mother, so they are hoping for a TCU placement and then possibly long-term living facility.    Patient denies any hematuria, hematochezia, abdominal pain, nausea, vomiting, and fever.  Patient endorses feeling more tired lately and difficulty with his balance when walking.      Past Medical History    Past Medical History:   Diagnosis Date     Aortic valve insufficiency 7/11/2015    Mild-moderate AI echo July 2015      Arthritis 2010    hands     Atrial fibrillation (H) 2015     Atrial flutter (H) 07/10/2015     Cancer (H)      Essential hypertension 2014     Hematuria 07/sep/2007    unknown per pt     Hyperlipidemia 4/13/2018     Left atrial thrombus  5/3/2016    MELONY 3/9/2016      Lymphoma (H)      Macular degeneration 2015     Persistent atrial fibrillation (H) 7/24/2015     Stroke (H) 03/04/2014    acute L Thalmic stroke     Thalamic infarction (H) 9/14/2018     Thrombus of right atrial appendage     per H&P       Past Surgical History   Past Surgical History:   Procedure Laterality Date     ARTHROSCOPY SHOULDER ROTATOR CUFF REPAIR Left 2009    one     ARTHROSCOPY SHOULDER ROTATOR CUFF REPAIR Right 2008    two times     CATARACT EXTRACTION Bilateral 2015     HERNIA REPAIR  2015     HERNIORRHAPHY INGUINAL Right 8/13/2021    Procedure: RIGHT INGUINAL HERNIA REPAIR;  Surgeon: Scott Hunter MD;  Location: [facility] Main OR     IR CHEST PORT PLACEMENT > 5 YRS OF AGE  1/14/2020     IR PORT PLACEMENT >5 YEARS  1/14/2020     IR PORT REMOVAL  6/19/2020     IR PORT REMOVAL  6/19/2020     KNEE SURGERY Left 1980     NEUROPLASTY / TRANSPOSITION MEDIAN NERVE AT CARPAL TUNNEL BILATERAL Bilateral 2007     US THORACENTESIS  12/6/2019       Prior to Admission Medications   Prior to Admission Medications   Prescriptions Last Dose Informant Patient Reported? Taking?   ELIQUIS 5 mg Tab tablet 1/9/2023 at am  No Yes   Sig: [ELIQUIS 5 MG TAB TABLET] TAKE 1 TABLET BY MOUTH TWICE DAILY   Multiple Vitamins-Minerals (PRESERVISION AREDS 2+MULTI VIT PO) 1/8/2023  Yes Yes   Sig: Take 1 capsule by mouth 2 times daily   SAW PALMETTO ORAL 1/8/2023  Yes Yes   Sig: Take 1 capsule by mouth daily    alpha lipoic acid 100 mg cap 1/8/2023  Yes Yes   Sig: [ALPHA LIPOIC ACID 100 MG CAP] Take 100 mg by mouth daily.   ascorbic acid, vitamin C, (ASCORBIC ACID WITH NATHALIE HIPS) 500 MG tablet 1/8/2023  Yes Yes   Sig: [ASCORBIC ACID, VITAMIN C, (ASCORBIC ACID WITH NATHALIE HIPS) 500 MG TABLET] Take 500 mg by mouth daily.   b complex vitamins tablet 1/8/2023  Yes Yes   Sig: [B COMPLEX VITAMINS TABLET] Take 1 tablet by mouth daily. B Complex 50 Tablet (B Complex Vitamins) Give 1  tablet by mouth one time a  day for Supplement          cholecalciferol, vitamin D3, 1,000 unit tablet 1/8/2023  Yes Yes   Sig: [CHOLECALCIFEROL, VITAMIN D3, 1,000 UNIT TABLET] Take 2,000 Units by mouth daily.   clopidogrel (PLAVIX) 75 MG tablet 1/9/2023  Yes Yes   Sig: Take 75 mg by mouth daily   coenzyme Q10 (CO Q-10) 100 mg capsule 1/8/2023  Yes Yes   Sig: [COENZYME Q10 (CO Q-10) 100 MG CAPSULE] Take 100 mg by mouth daily.   fluticasone (FLONASE) 50 mcg/actuation nasal spray 1/8/2023  Yes Yes   Sig: [FLUTICASONE (FLONASE) 50 MCG/ACTUATION NASAL SPRAY] Apply 1 spray into each nostril daily as needed.          lactobacillus rhamnosus, GG, (CULTURELL) capsule 1/8/2023  Yes Yes   Sig: Take 1 capsule by mouth daily   levOCARNitine 500 mg Tab 1/8/2023  Yes Yes   Sig: Take 500 mg by mouth daily as needed   magnesium 200 mg Tab 1/8/2023  Yes Yes   Sig: [MAGNESIUM 200 MG TAB] Take 200 mg by mouth daily.   metoprolol tartrate (LOPRESSOR) 25 MG tablet 1/9/2023  Yes Yes   Sig: [METOPROLOL TARTRATE (LOPRESSOR) 25 MG TABLET] Take 25 mg by mouth daily.          neomycin-polymixin-dexamethasone (MAXITROL) ophthalmic ointment 1/8/2023  Yes Yes   Sig: Place into both eyes At Bedtime   omega-3/dha/epa/fish oil (FISH OIL-OMEGA-3 FATTY ACIDS) 300-1,000 mg capsule 1/8/2023  Yes Yes   Sig: Take 2 g by mouth daily   peg 400-propylene glycol PF (SYSTANE) 0.4-0.3 % Dpet 1/8/2023  Yes Yes   Sig: [-PROPYLENE GLYCOL PF (SYSTANE) 0.4-0.3 % DPET] Administer 1 drop to both eyes 4 (four) times a day as needed.   simvastatin (ZOCOR) 40 MG tablet 1/8/2023  Yes Yes   Sig: Take 40 mg by mouth At Bedtime   zinc gluconate 30 mg Tab 1/8/2023  Yes Yes   Sig: Take 30 mg by mouth daily as needed       Facility-Administered Medications: None        Review of Systems    ROS is negative other than what is listed in the HPI.    Social History   I have reviewed this patient's social history and updated it with pertinent information if needed.  Social History     Tobacco Use      "Smoking status: Never     Smokeless tobacco: Never   Vaping Use     Vaping Use: Never used   Substance Use Topics     Alcohol use: No     Drug use: No       Allergies   Allergies   Allergen Reactions     Pollen [Pollen Extract] Itching     Runny nose, sneezing, itchy eyes      Other Environmental Allergy Itching     Running nose. Itchy eye, DUST     Ragweed [Ragweeds] Itching     Running nose, itchy eyes     Yeast, Dried [Yeast] Headache     brewrys yeast only        Physical Exam   Vital Signs: Temp: 99.8  F (37.7  C) Temp src: Temporal BP: 104/58 Pulse: 95   Resp: 19 SpO2: 98 %      Weight: 162 lbs 0 oz    General appearance: laying in bed comfortably, in no distress, pale appearing  Head: normocephalic, without obvious abnormalities  Eyes: conjunctivae/corneas clear  Ears: hearing grossly intact  Nose: nares normal, no drainage  Throat: lips, mucosa, and tongue normal, teeth and gums normal, no palpable lymphadenopathy  Lung: clear to auscultation bilaterally, no wheezing, coughing, or use of accessory muscles  Heart: regular rate and rhythm, S1, S2 normal, no murmur, click, rub, or gallop  Abdomen: limited by obesity, soft, non-tender, bowel sounds present in all four quadrants, no masses or organomegaly  Extremities: warm, dry, atraumatic, no cyanosis  Skin: pale appearing, texture, and turgor. No rashes or lesions  Neurologic: sensation grossly intact in bilateral lower extremities with pain to palpation  Psychologic: orientated only to self, apologetic for \"rudeness\" which he blames on his two past strokes, calls his dog by a name of a passed pet      Data     I have personally reviewed the following data over the past 24 hrs:    21.4 (H)  \   7.9 (L)   / 362     138 103 24 /  192 (H)   4.0 23 1.02 \       ALT: 21 AST: 29 AP: 205 (H) TBILI: 0.8   ALB: 2.9 (L) TOT PROTEIN: 6.7 LIPASE: N/A       Trop: N/A BNP: 323 (H)       Procal: N/A CRP: N/A Lactic Acid: 2.4 (H)         Imaging results reviewed over the past " 24 hrs:   Recent Results (from the past 24 hour(s))   Head CT w/o contrast    Narrative    EXAM: CT HEAD W/O CONTRAST  LOCATION: [facility]  DATE/TIME: 1/9/2023 12:43 PM    INDICATION: Confusion.  COMPARISON: None.  TECHNIQUE: Routine CT Head without IV contrast. Multiplanar reformats. Dose reduction techniques were used.    FINDINGS:  INTRACRANIAL CONTENTS: No acute intracranial hemorrhage. No extra-axial fluid collection. No mass effect or midline shift. Chronic infarcts involving the left thalamus and posterior limb of the right internal capsule. Moderate presumed chronic small   vessel ischemic changes. Cerebral volume loss more pronounced in the bilateral temporal lobes.     VISUALIZED ORBITS/SINUSES/MASTOIDS: Prior bilateral cataract surgery. Visualized portions of the orbits are otherwise unremarkable. No paranasal sinus mucosal disease. Scattered fluid/membrane thickening in the left mastoid air cells. No apparent mass in   the posterior nasopharynx or skull base.    BONES/SOFT TISSUES: No acute abnormality.      Impression    IMPRESSION:  1.  No acute intracranial hemorrhage.  2.  Chronic lacunar type infarcts involving the left thalamus and posterior limb of the right internal capsule.  3.  Cerebral volume loss more pronounced in the temporal lobes, a finding which can be seen with Alzheimer's dementia.  4.  Moderate burden presumed sequela of chronic small vessel ischemic disease.   XR Chest Port 1 View    Narrative    EXAM: XR CHEST PORT 1 VIEW  LOCATION: [facility]  DATE/TIME: 1/9/2023 3:29 PM    INDICATION: Sepsis.  COMPARISON: Chest CT 07/07/2022 Chest radiograph 01/13/2022.      Impression    IMPRESSION: Small left and trace right pleural effusions. Low lung volumes. Increased interstitial markings within the right greater than left mid to lower lungs appear similar to the prior CT and may reflect interstitial edema. No new airspace   opacities.  Unchanged cardiomegaly. No pneumothorax.   CTA Abdomen Pelvis with Contrast    Narrative    EXAM: CTA ABDOMEN PELVIS WITH CONTRAST  LOCATION: [facility]  DATE/TIME: 1/9/2023 3:58 PM    INDICATION: Anemia. GI bleed. Anticoagulation. Sepsis.  COMPARISON: 8/17/2022, 5/27/2020  TECHNIQUE: CT angiogram abdomen pelvis during arterial phase of injection of IV contrast. 2D and 3D MIP reconstructions were performed by the CT technologist. Dose reduction techniques were used.  CONTRAST: Isovue 370 90mL    FINDINGS:  ANGIOGRAM ABDOMEN/PELVIS: There was suboptimal opacification of the arteries due to contrast extravasation during the injection. No active contrast extravasation into the bowel or definite evidence of GI bleeding. However, sensitivity is significantly   limited. Moderate calcified plaque in the abdominal aorta. The mesenteric and renal arteries are patent. Iliac arteries are patent.    LOWER CHEST: A small right pleural effusion is unchanged from 8/17/2022. Rounded atelectasis in the right lower lobe posterior medially is also unchanged.    HEPATOBILIARY: There are innumerable peripherally enhancing masses throughout the liver measuring up to 3.0 cm in diameter which are new from 8/17/2022 and highly suspicious for malignancy. The pattern is atypical for lymphoma and other etiologies should   be considered.    PANCREAS: Diffuse pancreatic calcification. Soft tissue fullness in the pancreatic head measuring approximately 3 cm.    SPLEEN: Normal.    ADRENAL GLANDS: Normal.    KIDNEYS/BLADDER: Small benign left renal cyst. No follow-up needed. Few intrarenal calcifications measuring up to 3 mm may represent arterial calcification and/or small nonobstructing calyceal stones. No ureteral stones or hydronephrosis.    BOWEL: The stomach is decompressed. The small and large intestines are normal caliber. Few sigmoid diverticula without active inflammation.    LYMPH NODES: No adenopathy.    PELVIC  ORGANS: Moderate enlargement the prostate gland.    MUSCULOSKELETAL: Degenerative changes in the lumbar spine.      Impression    IMPRESSION:  1.  The study is significantly limited by contrast extravasation during the injection with reduced opacification of the arterial system. No definite evidence of active GI bleeding.  2.  There are innumerable peripherally enhancing masses throughout the liver measuring up to 3.0 cm in diameter. These are new from 8/17/2022 and are highly suspicious for malignancy. The pattern is atypical for lymphoma and other etiologies should be   considered.  3.  There is soft tissue fullness in the pancreatic head measuring approximately 3 cm. This is indeterminate and could represent a primary pancreatic malignancy or metastasis. This finding is new from 2020 and not adequately imaged on the most recent   chest CT. An MRI of the pancreas without and with IV gadolinium can be considered for further evaluation.  4.  Diffuse pancreatic calcification consistent with chronic pancreatitis.  5.  Small right pleural effusion and rounded atelectasis in the right lower lobe are unchanged from 8/17/2022.

## 2023-01-10 NOTE — CONSULTS
ONCOLOGY CONSULT NOTE:    DOS:  1/10/2023    PRIMARY ONCOLOGIST:  Suzan Bowling MD  [facility]  [phone]    Reason for admission:  1) Anemia:  Possible GI bleed.    2)  Sepsis:  New onset.   3)  New onset confusion:  Unclear etiology.  4)  History of diffuse large B cell lymphoma with abnormal CT findings of pancreatic mass and possible liver metastases.    5)  Atrial fibrillation with history of CVA and thalamic infarction.    Oncologic History:  Son Huizar is an 87-year-old male with a diagnosis of diffuse large B-cell lymphoma. His oncologic history is reviewed as follows:  1. 9/4/2019: He was evaluated for left upper quadrant abdominal pain following a fall. CT scan showed fractures of the left eighth through 12 ribs and large left pleural effusion with compressive atelectasis in the left lower lobe. Right lung is clear. No other abnormalities. Patient had a chest tube placed and hemorrhagic pleural fluid was drained.  2. 12/2/2019: Patient was evaluated for worsening shortness of breath and cough. Chest x-ray showed left pleural effusion. He underwent ultrasound-guided thoracentesis and 1.5 L of bloody fluid were removed. Pathology of the pleural fluid showed diffuse large B-cell lymphoma, activated B-cell subtype. FISH showed no MYC rearrangement. No IGH/BCL-2 rearrangement. There is a BCL6 rearrangement. HHV-8 immunohistochemistry is negative.  3. 1/2/2020: PET scan shows mildly FDG avid small left pleural effusion with a max SUV of 2.4 suspicious for the pathologically proven pleural diffuse large B-cell lymphoma. The remaining FDG uptake is physiologic from the skull base to the mid thigh.  4. 1/23/2020 - 5/7/2020, received six cycles of mini R CHOP chemotherapy. (Rituxmab 375 mg/m2 IV = 686 mg), cyclophosphamide (400 mg/m2 = 730 mg IV), doxorubicin (25 mg/m2 = 46 mg IV), vincristine 1 mg IV and prednisone 100 mg po daily x 5 days every three weeks.  5.  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  6. 12/1/2020: CT chest shows improved left pleural effusion with either trace fluid versus thickening remaining. Slightly improved but persistent small right pleural effusion.  7.  6/5/2021:  CT chest shows no adenopathy to suggest recurrence.  Right pleural loculated effusion present.   8.  1/7/2022:  CT chest shows no change from prior study.   9.  8/17/2022:  CT scan chest shows unchanged small organized right greater than left pleural effusions and subpleural rounded atelectasis along the  posterior pleura of the right lower lobe.   10.  1/9/2023:  Presented to [facility] ED.  CT C/A/P shows innumerable peripherally enhancing masses throughout the liver measuring up to 3.0 cm in diameter.  Soft tissue fullness in the pancreatic head measuring approximately 3 cm.  Does not show site of active GI bleed.  Head CT shows  No acute intracranial hemorrhage.     Subjective:  Patient in room with daughter.  Claims that he will never do chemotherapy again.  Is somewhat confrontation and agitated when discussing [facility] and the government.  Discussed recent mental changes with daughter outside of the room, his confusion seems to have worsened over the last two weeks.      Objective:  VSS reviewed in EMR.  No exam.     Labs:  CBC shows Hgb = 6.3 last evening, received 1 unit PRBCs and this morning = 7.4.  WBC = 17.4, Platelets = 274,000.     BNP elevated to 323    Imaging as above.      Discussed with Dr. Jose Mar, [facility] Medicine Resident.  Will participate in family conference tomorrow regarding additional evaluation and/or biopsy of likely second cancer.      Dina Lopez, RN, MS, CNP  [facility] [place] [facility]  [phone]  Cell:  [phone] (Tuesday - Friday, 8:30 am to 5 pm)

## 2023-01-10 NOTE — CONSULTS
Care Management Initial Consult    General Information  Assessment completed with: Carla, Daughter Maddi ([phone])  Type of CM/SW Visit: Initial Assessment    Primary Care Provider verified and updated as needed: Yes   Readmission within the last 30 days: no previous admission in last 30 days      Reason for Consult: discharge planning, facility placement  Advance Care Planning: Advance Care Planning Reviewed: no concerns identified, other (see comments) (Declined Honoring Choices)     General Information Comments: Lives with wife who is having difficulty caring for patient at home    Communication Assessment  Patient's communication style: spoken language (English or Bilingual)    Hearing Difficulty or Deaf: no   Wear Glasses or Blind: yes    Cognitive  Cognitive/Neuro/Behavioral: .WDL except, orientation  Level of Consciousness: confused  Arousal Level: opens eyes spontaneously  Orientation: disoriented to, place, time, situation  Mood/Behavior: behavior appropriate to situation, anxious     Speech: clear, word-finding difficulty    Living Environment:   People in home: spouse     Current living Arrangements: house      Able to return to prior arrangements: other (see comments) (Family would like TCU to LTC placement)  Living Arrangement Comments: Lives with wife who also has health issues    Family/Social Support:  Care provided by: spouse/significant other  Provides care for: no one, unable/limited ability to care for self  Marital Status:   Wife, Children  Marisa Ferguson       Description of Support System: Supportive, Involved    Support Assessment: Lacks adequate physical care, Caregiver experiencing high stress, Caregiver difficulty providing physical care/safety    Current Resources:   Patient receiving home care services: No     Community Resources: None  Equipment currently used at home: cane, straight, walker, standard  Supplies currently used at home: None    Employment/Financial:  Employment  Status: retired        Financial Concerns: No concerns identified           Lifestyle & Psychosocial Needs:  Social Determinants of Health     Tobacco Use: Low Risk      Smoking Tobacco Use: Never     Smokeless Tobacco Use: Never     Passive Exposure: Not on file   Alcohol Use: Not on file   Financial Resource Strain: Not on file   Food Insecurity: Not on file   Transportation Needs: Not on file   Physical Activity: Not on file   Stress: Not on file   Social Connections: Not on file   Intimate Partner Violence: Not on file   Depression: Not on file   Housing Stability: Not on file       Functional Status:  Prior to admission patient needed assistance:   Dependent ADLs:: Ambulation-cane, Ambulation-walker, Bathing, Grooming  Dependent IADLs:: Cleaning, Cooking, Laundry, Shopping, Meal Preparation, Medication Management, Transportation  Assesssment of Functional Status: Not at  functional baseline, Needs placement in a SNF/TCF for rehabilitation    Mental Health Status:          Chemical Dependency Status:              Values/Beliefs:  Spiritual, Cultural Beliefs, Adventism Practices, Values that affect care:                 Additional Information:   Son Huizar is a 87 year old year old male with history of atrial fibrillation, hypertension, hyperlipidemia, thalamic CVA, and large cell lymphoma treated 3 years ago through [facility] who presented to the ER with increasing confusion and generalized weakness dating back to 12/24/22.  In the ER, laboratory evaluation revealed significant leukocytosis with white blood cell count of 21.4 and hemoglobin 7.8 (15.8 1/3/22). Cr of 1.02 with BUN 24. LFTs were unremarkable with the exception of alk phos elevated to 205 and albumin 2.9 .    Information gathered from patient s daughter Maddi Garber ([phone]). Patient lives with his wife Marisa Ferguson in a private residence. Wife who  has health and strength issues , per Maddi, is primary caregiver for patient. Wife has been  assisting patient with all I/ADLs for the last month. Manages medications, bathing, dressing, and at times feeding patient who is forgetful. Family has taken car keys so patient doesn t drive which they feel is not safe.    Family is in process of reviewing [facility]/LTCs for patient but they don t have anything in place yet. They would like patient going to TCU for strengthening and then transfer patient to an [facility]/LTC. Maddi understands that if patient doesn t meet TCU criteria for insurance coverage, family would have to pay out of pocket. Patient has been to TCU is past and Maddi feels if provider recommends it patient will go to one. Initial referral made to following facilities in order of choice preference. [facility], [facility], [facility], [facility], and [facility]. Family will transport patient on discharge. Other needs pending clinical progress.    NIKKI JORGE, RN/CM

## 2023-01-10 NOTE — CONSULTS
"GI CONSULT NOTE      Name: Son Huizar  Medical Record #: [id number]  YOB: 1935  Date of Admission: 1/9/2023  Date/Time: 1/10/2023/9:03 AM     CHIEF COMPLAINT: Weakness and confusion     HISTORY OF PRESENT ILLNESS: We were asked to see Son Huizar by Dr Javier for evaluation of possible gi bleeding.   Son Huizar is a 87 year old year old male with history of atrial fibrillation, hypertension, hyperlipidemia, thalamic CVA, and large cell lymphoma treated 3 years ago through [facility] who presented to the ER with increasing confusion and generalized weakness dating back to 12/24/22.  In the ER, laboratory evaluation revealed significant leukocytosis with white blood cell count of 21.4 and hemoglobin 7.8 (15.8 1/3/22). Cr of 1.02 with BUN 24. LFTs were unremarkable with the exception of alk phos elevated to 205 and albumin 2.9.  UA did not suggest infection.  Blood cultures are pending.  CTA of the abdomen and pelvis with contrast did not show any active bleeding but showed innumerable peripherally enhancing masses throughout the liver suspicious for malignancy and soft tissue fullness in the pancreatic head measuring approximately 3 cm possibly representing a pancreatic malignancy or metastases.  Also noted were findings suspicious for chronic pancreatitis as well as a small right pleural effusion unchanged from August 2022.   The patient has not shown any sign of overt GI bleeding in the ER.  He did have a positive FOBT.  Hemoglobin on repeat was down to 6.3 and he has received 1 unit of packed red blood cells with repeat hemoglobin pending.    The patient is not able to provide a history.  He states, \"Get the hell out of here. I don't want to talk to anyone at this hour. What are you doing?\"  I tried contacting the patient's wife Xena but was not able to reach her.  I did contact the patient's daughter Maddi who was able to provide collateral information.  Maddi reports her father has " "had progressive confusion dating back to 12/24/2022.  There has not been any report of abdominal pain, nausea, or vomiting.  There has not been any report of black or bloody stools nor has there been a change in bowel pattern.  She is not aware of any fevers.  The patient has had difficulties with urinary incontinence which is new.  Most recent colonoscopy was October 4, 2013 which showed diverticulosis.  He has never had an upper endoscopy.  He is on Plavix and Eliquis.  He is not on a PPI or H2 blocker.  There is no family history of GI malignancies.  Maddi reports the patient was treated for large cell lymphoma 3 years ago through [facility].  He has  follow-up with oncology every 6 months with last scan in September which apparently looked \"good.\"    REVIEW OF SYSTEMS (ROS): Complete review of systems negative other than listed in HPI.    PAST MEDICAL HISTORY:  Past Medical History:   Diagnosis Date     Aortic valve insufficiency 7/11/2015    Mild-moderate AI echo July 2015      Arthritis 2010    hands     Atrial fibrillation (H) 2015     Atrial flutter (H) 07/10/2015     Cancer (H)      Essential hypertension 2014     Hematuria 07/sep/2007    unknown per pt     Hyperlipidemia 4/13/2018     Left atrial thrombus 5/3/2016    MELONY 3/9/2016      Lymphoma (H)      Macular degeneration 2015     Persistent atrial fibrillation (H) 7/24/2015     Stroke (H) 03/04/2014    acute L Thalmic stroke     Thalamic infarction (H) 9/14/2018     Thrombus of right atrial appendage     per H&P        FAMILY HISTORY:  Family History   Problem Relation Age of Onset     Cancer Father      No Known Problems Mother      Cancer Sister      No Known Problems Brother        SOCIAL HISTORY:   and lives in home with his wife.  MEDICATIONS PRIOR TO ADMISSION: (Not in a hospital admission)         ALLERGIES: Pollen [pollen extract]; Other environmental allergy; Ragweed [ragweeds]; and Yeast, dried [yeast]    PHYSICAL EXAM:    BP " 101/55 (BP Location: Right arm)   Pulse 88   Temp 98.3  F (36.8  C) (Oral)   Resp 20   Wt 73.5 kg (162 lb)   SpO2 97%   BMI 23.92 kg/m      GENERAL: Resting, not cooperative. Exam was abbreviated due to patient requesting he be left alone.   EYES: No scleral icterus  LUNGS: Clear to auscultation bilaterally  HEART: S1S2  ABDOMEN: Non-distended. Positive bowel sounds. Soft, non-tender, no guarding/rebound/mass, no obvious organomegaly  MUSKULOSKELETAL:  Warm and well perfused, moves all extremities well  SKIN: No jaundice  NEUROLOGIC: Alert not able to state time of day or location.   PSYCHIATRIC: mildly agitated  LAB DATA:  CMP Results:   Recent Labs   Lab Test 01/09/23  1230 07/28/22  1629 01/13/22  1520    141 138   POTASSIUM 4.0 4.2 4.2   CHLORIDE 103 102 104   CO2 23 30* 28   ANIONGAP 12 9 6   * 111* 123   BUN 24 21.7 22   CR 1.02 0.96 1.11   BILITOTAL 0.8 0.6 0.8   ALKPHOS 205* 76 68   ALT 21 22 15   AST 29 25 19      CBC  Recent Labs   Lab 01/09/23  2345 01/09/23  1230   WBC 16.3* 21.4*   RBC 2.65* 3.33*   HGB 6.3* 7.9*   HCT 21.2* 26.4*   MCV 80 79   MCH 23.8* 23.7*   MCHC 29.7* 29.9*   RDW 16.5* 16.5*    362     INRNo lab results found in last 7 days.   No results found for: LIPASE  UA negative, FOBT positive, CXR without definite infiltrate. Blood cultures negative to date  IMAGING:  EXAM: CTA ABDOMEN PELVIS WITH CONTRAST  LOCATION: [facility]  DATE/TIME: 1/9/2023 3:58 PM     INDICATION: Anemia. GI bleed. Anticoagulation. Sepsis.  COMPARISON: 8/17/2022, 5/27/2020  TECHNIQUE: CT angiogram abdomen pelvis during arterial phase of injection of IV contrast. 2D and 3D MIP reconstructions were performed by the CT technologist. Dose reduction techniques were used.  CONTRAST: Isovue 370 90mL     FINDINGS:  ANGIOGRAM ABDOMEN/PELVIS: There was suboptimal opacification of the arteries due to contrast extravasation during the injection. No active contrast extravasation  into the bowel or definite evidence of GI bleeding. However, sensitivity is significantly   limited. Moderate calcified plaque in the abdominal aorta. The mesenteric and renal arteries are patent. Iliac arteries are patent.     LOWER CHEST: A small right pleural effusion is unchanged from 8/17/2022. Rounded atelectasis in the right lower lobe posterior medially is also unchanged.     HEPATOBILIARY: There are innumerable peripherally enhancing masses throughout the liver measuring up to 3.0 cm in diameter which are new from 8/17/2022 and highly suspicious for malignancy. The pattern is atypical for lymphoma and other etiologies should   be considered.     PANCREAS: Diffuse pancreatic calcification. Soft tissue fullness in the pancreatic head measuring approximately 3 cm.     SPLEEN: Normal.     ADRENAL GLANDS: Normal.     KIDNEYS/BLADDER: Small benign left renal cyst. No follow-up needed. Few intrarenal calcifications measuring up to 3 mm may represent arterial calcification and/or small nonobstructing calyceal stones. No ureteral stones or hydronephrosis.     BOWEL: The stomach is decompressed. The small and large intestines are normal caliber. Few sigmoid diverticula without active inflammation.     LYMPH NODES: No adenopathy.     PELVIC ORGANS: Moderate enlargement the prostate gland.     MUSCULOSKELETAL: Degenerative changes in the lumbar spine.                                                                      IMPRESSION:  1.  The study is significantly limited by contrast extravasation during the injection with reduced opacification of the arterial system. No definite evidence of active GI bleeding.  2.  There are innumerable peripherally enhancing masses throughout the liver measuring up to 3.0 cm in diameter. These are new from 8/17/2022 and are highly suspicious for malignancy. The pattern is atypical for lymphoma and other etiologies should be   considered.  3.  There is soft tissue fullness in the  pancreatic head measuring approximately 3 cm. This is indeterminate and could represent a primary pancreatic malignancy or metastasis. This finding is new from 2020 and not adequately imaged on the most recent   chest CT. An MRI of the pancreas without and with IV gadolinium can be considered for further evaluation.  4.  Diffuse pancreatic calcification consistent with chronic pancreatitis.  5.  Small right pleural effusion and rounded atelectasis in the right lower lobe are unchanged from 8/17/2022.    ASSESSMENT:  Acute anemia, questionable gi blood loss anemia-- This is a 87 year old year old male with history of thalamic CVA, atrial fibrillation (on Elquis and Plavix), hypertension, hyperlipidemia, and large cell lymphoma treated 3 years ago through [facility] who presented to the ER with increasing confusion and generalized weakness dating back to 12/24/22. Laboratory evaluation revealed leuokocytosis and anemia (hgb down to 6.3 and MCV 79 vs hgb 15 1 year ago). BUN is not significantly elevated to support upper gi bleeding. CTA abdomen and pelvis did not reveal active gi bleeding. Possible etiologies to account for anemia include anemia of chronic disease, iron deficiency, vitamin B12 and folate deficiency as well as possible gi blood loss in the setting of Plavix and Eliquis (FOBT is positive, but no overt signs of gi bleeding).   Will plan to proceed with upper endoscopy to further evaluate for source of gi bleeding which could include esophagitis, PUD, duodenitis, AVMs, and malignancy.   Liver masses and soft tissue fullness in the pancreatic head on CTA-- these findings would be atypical for lymphoma, but highly suspicious for malignancy. Will likely recommend additional imaging via MRI and possible outpatient EUS. Consider oncology consultation given history of large cell lymphoma.   PLAN:  NPO. Continue twice daily iv ppi. Follow hgb and transfuse RBCs as needed.  Agree with holding Plavix and Eliquis  (last dose AM 1/9/23).   Plan for EGD-- timing to be determined after discussing with Dr Gold.   MRI of the liver and pancreas to further evaluate abnormalities noted on CTA could be consdered depending on goals of care.   Consider scanning the bladder for post void residual in light of altered mental status. Follow blood cultures.     Total time spent on this encounter= 45 minutes.   Will further discuss with Dr Gold.   ADDENDUM: I discussed with Dr Gold. Given last dose of Eliquis and Plavix was 1/9/23 and there has not been any overt gi bleeding, recommend close observation off blood thinners and possible EGD 1-2 days. The patient is refusing cares and care goals should be clarified. Will hold off on ordering additional imaging of the liver and pancreas for now. I tried contacting the patient's wife LENCHO, but have not been able to reach her. Ok for clear liquids.   Mirella John PA-C  11:05 Addendum: I was able to connect with the patient's wife Marisa Ferguson (wrong number is listed on Facesheet. Correct # [phone]). Lazara agrees with deferring EGD. She reports having difficulty caring for her  at home. The family is currently looking for memory care. The patient's wife would not want to proceed with invasive procedures or additional imaging if additional tests would not change the treatment plan or outcome. Palliative care consult might be appropriate to help clarify care goals, but will defer to primary team.   Mirella John PA-C  Total time on this encounter=60minutes                                             Mirella John PA-C  Thank you for the opportunity to participate in the care of this patient.   Please feel free to call me with any questions or concerns.  Phone number [phone].            The patient was seen and evaluated in conjunction with Mirella John. Please see her note for details.     Briefly, Son is an 88 y/o male with dementia, hx of CVA, Diffuse large B  cell lymphoma on long term anticoagulation with plavix and eliquis who we are consulted on for anemia and abnormal CT scan findings. He as not had signs of overt GI bleeding here. May have slow blood loss while on anticoagulation. Was FOBT positive. Wife is interested in avoiding invasive procedures currently. Also with new liver lesions and a fullness in the pancreatic head on CT scan. These change are concerning for a metastatic malignancy or possibly progression of lymphoma. Agree with goals of care discussions given patients dementia and wifes/familiies plan to move patient to memory care. We will be available should aggressive therapies/diagnostics be desired.     Seth Gold DO  1/10/93655:56 PM  [facility]    20 minutes of total time was spent providing patient care, including patient evaluation, reviewing documentation/test results and .

## 2023-01-10 NOTE — ED NOTES
Pt pulled out his Rt hand IV while unattended; slight bruising to hand but writer and EDT were able to get gauze and coban applied and bleeding controlled. Pt tolerated coban well; writer reinforced IV to Lt lower forearm/wrist. Will let MD know limited IV access at this time (Lt lower will draw back for blood but not flush). Pt is disoriented/has dementia at baseline.

## 2023-01-10 NOTE — PROGRESS NOTES
[facility]    Progress Note - Hospitalist Service       Date of Admission:  1/9/2023    Assessment & Plan   Son Huizar is a 87 year old male admitted on 1/9/2023. He has a past medical history of atrial fibrillation, essential hypertension, hyperlipidemia, lymphoma, CVA, dementia, and thalamic infarction. He is admitted for increased weakness, incontinence, and confusion. He is newly diagnosed with anemia.     Anemia  GI bleed, unspecified  Anticoagulated due to atrial fibrillation  Hemoglobin was 7.9 on admission, decreased to 6.3. 1U PRBC ordered and administered. Hemoccult positive. CTA of abdomen/pelvis not indicative of active bleed.   - Hold Plavix and Eliquis  - GI consultation, appreciate recs and cares   - Recommended PPI BID   - Plan for EGD either tomorrow or the follow day (1/11-1/12)   - NPO midnight for possible procedure. Clear liquid diet until then   - Hgb checks every 8 hours     Sepsis rule out  Patient oriented to self only.  Elevated white blood count to 21.4, improved to 16.3. Patient given 2500 mL bolus. Lactic acid is 2.4 on recheck. Chest x-ray and CTA of abdomen and pelvis showing no signs of infection.  UA showing no infection.  Given 1 g ceftriaxone in the emergency department.  It is possible that WBC elevation is due to lymphoma. Low concern for sepsis at this time.   - Blood cultures pending. No growth to date     Confusion  According to daughter, patient at baseline has some dementia, but is normally oriented to person, place, and time. Patient is oriented only to self today. Differential includes infection vs prior CVAs vs developing Alzheimer's disease vs progression of cancer vs acute blood loss. Likely multifactorial.  - [facility] ordered     Diffuse large B cell lymphoma  Liver mass, newly diagnosed  Pancreatic mass, newly diagnosed  Follows with [facility], Dr. Ross. Discussed care with NARCISO Reyna with [facility]. Soft tissue fullness in  the pancreatic head measuring approximately 3 cm.  Innumerable peripheral enhancing masses throughout the liver measuring up to 3 cm.  Both are highly suspicious of malignancy.  Per CTA of abdominal and pelvis read, it is recommended that an MRI of the pancreas without and with IV gadolinium be used for further evaluation.  - Care conference with family tomorrow (1/11) around 1200. Patient was adamant about not receiving any treatment for cancer. Daughter states that he has said this for some time, even prior to his recent worsening of dementia.   - Consider Palliative consult     Atrial fibrillation  History of CVA and thalamic infarction  CT head without contrast showing chronic lacunar type infarcts involving the thalamus and posterior limb of the right internal capsule, cerebral volume loss which is consistent with Alzheimer's dementia, and moderate chronic small vessel ischemic disease.  - Continue metoprolol tartrate 25 mg daily  - Cardiac telemetry  - Holding Plavix and Eliquis due to possible GI bleed     Hyperlipidemia  - Continue simvastatin 40 mg at bedtime     Chronic hypomagnesemia  On admission, magnesium is 1.8.  - PTA magnesium 200 mg daily     Placement on discharge  Patient lives at home with wife.  According to daughter, this may be too difficult for patient's wife to take care of patient.  She requests looking into a TCU and possible long-term living facilities.  - Care management consult     Discussed plan of care and next steps at length with daughter, Maddi, wife, Lazara, and oncology provider, Dina Lopez.  Patient's family is in agreement that patient may not be able to appropriately make decisions regarding his medical care given his recent worsening of dementia.  They also state that patient has been vocal about refusing chemotherapy in the past and believe that he would refuse any treatment if he were oriented.  Discussed that the likely next step in this cancer work-up would be a  "biopsy, but if treatment would not be pursued, the biopsy would be strictly academic in nature and create unnecessary risk.  Family expressed understanding and is open to discussing this further at a care conference tomorrow around 1200.     Diet: Clear Liquid Diet  NPO for Medical/Clinical Reasons Except for: Meds    DVT Prophylaxis: DOAC deferred until hemoglobin stabilizes  Ghotra Catheter: Not present  Fluids: PO  Lines: None     Cardiac Monitoring: ACTIVE order. Indication: Tachyarrhythmias, acute (48 hours)  Code Status: Full Code      Clinically Significant Risk Factors Present on Admission      # Hypercalcemia: corrected calcium is >10.1, will monitor as appropriate    # Hypoalbuminemia: Lowest albumin = 2.9 g/dL at 1/9/2023 12:30 PM, will monitor as appropriate                Disposition Plan   Expected Discharge Date: 01/11/2023      Destination: long-term care facility;nursing home;other (comment) (TCU)          The patient's care was discussed with the Attending Physician, Dr. Abelardo Alvarez MD.    Subhash Mar DO  Hospitalist Service  [facility]  Securely message with [employer] (more info)  Text page via [facility] Paging/Directory   ______________________________________________________________________    Interval History   Patient was admitted yesterday for new onset weakness, confusion, anemia, and incontinence.  When I saw the patient he was only oriented to self, but did not know his name.  He knew he was in the hospital, but did not know the name of it.  He did not know what month it is.  He denied any pain and expressed frustration about being in the hospital because \"this is all new to me\".  He perseverated on the fact that his son-in-law may be the one who is \"behind all this\".    Physical Exam   Vital Signs: Temp: 98.6  F (37  C) Temp src: Oral BP: 91/58 Pulse: 92   Resp: 22 SpO2: 97 % O2 Device: None (Room air)    Weight: 162 lbs 0 oz  General appearance: laying in bed " comfortably, in no distress, pale appearing  Head: normocephalic, without obvious abnormalities  Eyes: conjunctivae/corneas clear.  Corrective glasses in place  Ears: hearing grossly intact  Nose: nares normal, no drainage  Throat: lips, mucosa, and tongue normal, teeth and gums normal  Lung: clear to auscultation bilaterally, no wheezing, coughing, or use of accessory muscles  Heart: regular rate and rhythm, S1, S2 normal, no murmur, click, rub, or gallop  Abdomen: limited by obesity, soft, non-tender, bowel sounds present, no masses or organomegaly  Extremities: warm, dry, atraumatic, no cyanosis  Skin: pale appearing, texture, and turgor. No rashes or lesions  Neurologic: sensation grossly intact in bilateral lower extremities with no pain to palpation  Psychologic: orientated only to self       Data     I have personally reviewed the following data over the past 24 hrs:    17.4 (H)  \   7.4 (L)   / 274     N/A N/A N/A /  N/A   N/A N/A N/A \       Procal: N/A CRP: N/A Lactic Acid: 1.4         Imaging results reviewed over the past 24 hrs:   No results found for this or any previous visit (from the past 24 hour(s)).

## 2023-01-11 NOTE — PLAN OF CARE
VSS, patient denies pain. Disoriented to place, time, situation. Aggitated this morning, yelling at staff and refusing cares but he was calm for rest of day. Clear liquid diet, tolerating well. Up w/ an assist of 1. Checking hgb Q8, last check was 7.4. Trasnferred up to 3S at 1800.

## 2023-01-11 NOTE — PROGRESS NOTES
Oncology Progress Note:    DOS:  1/11/2023    Assessment/Plan:  1)  Anemia:  Hemoglobin is stabilizing and no obvious source of GI bleed.  In the event of an acute bleed, would transfuse PRBCs to keep Hgb>7.0.  2)  Abnormal CT scan with pancreatic mass and hepatic metastases.  Attending care conference with family present, along with Dr. Jose Mar.  Patient is adamant that he does not want chemotherapy.  After discussion with patient, wife (Marisa), daughter, Maddi, and son-in-law, Abelardo, the decision was made to transition patient to hospice and forego any further diagnostic imaging given the risk of bleeding associated with a liver biopsy and no intention to treat a new diagnosis of cancer.  Wife is unable to care for patient at home so will look for long term care facilities with supplemental hospice services.      Appreciate the assistance of staff, GI and [facility] program in caring for Iain.     If there are any additional questions or concerns, please contact us.  We will sign off unless additional needs arise.     Dina Lopez, RN, MS, CNP  [facility] [place] [facility]  [phone]  Cell:  [phone] (Tuesday - Friday, 8:30 am to 5 pm)

## 2023-01-11 NOTE — PROGRESS NOTES
Care Management Follow Up    Length of Stay (days): 2    Expected Discharge Date: 01/12/2023     Concerns to be Addressed: discharge planning, home safety (Wife unable to care for patient at home)     Patient plan of care discussed at interdisciplinary rounds: Yes    Anticipated Discharge Disposition: Home, Home Care, Skilled Nursing Facility, Transitional Care     Anticipated Discharge Services: Other (see comment) (Family wants TCU to LTC)  Anticipated Discharge DME: Other (see comment) (Pending clinical progress)    Patient/family educated on Medicare website which has current facility and service quality ratings:    Education Provided on the Discharge Plan:    Patient/Family in Agreement with the Plan: Yes    Referrals Placed by CM/SW:    Private pay costs discussed: Not applicable    Additional Information:  Spoke to pt's daughter, Maddi, and plan is to look for long term care facility with hospice.  Family hoping to keep pt as close to [facility] as possible.  [facility] will be reaching out to daughter today to discuss hospice services.    Long term care referrals sent.      VANESSA Davis

## 2023-01-11 NOTE — PROGRESS NOTES
Care Management Follow Up    Length of Stay (days): 2    Expected Discharge Date: 01/12/2023     Concerns to be Addressed: discharge planning, home safety (Wife unable to care for patient at home)     Patient plan of care discussed at interdisciplinary rounds: Yes    Anticipated Discharge Disposition: Home, Home Care, Skilled Nursing Facility, Transitional Care     Anticipated Discharge Services: Other (see comment) (Family wants TCU to LTC)  Anticipated Discharge DME: Other (see comment) (Pending clinical progress)    Patient/family educated on Medicare website which has current facility and service quality ratings:    Education Provided on the Discharge Plan:    Patient/Family in Agreement with the Plan: other (see comments) (Want TCU due to wife unable to care for patient)    Referrals Placed by CM/SW:    Private pay costs discussed: Not applicable    Additional Information:  Following pt regarding discharge plans.  TCU referrals sent.  Family meeting with Palliative Care today. Care Management will continue to follow.      VANESSA Davis

## 2023-01-11 NOTE — PLAN OF CARE
Goal Outcome Evaluation:      Plan of Care Reviewed With: patient    Overall Patient Progress: no change    Outcome Evaluation: NPO after midnight. Possible EGD today. Oriented to self; calm and cooperative. Afib/AFlutter on telemetry. Slept between care.      Problem: Plan of Care - These are the overarching goals to be used throughout the patient stay.    Goal: Readiness for Transition of Care  Outcome: Not Progressing     Problem: Confusion Chronic  Goal: Optimal Cognitive Function  Outcome: Not Progressing  Intervention: Minimize Injury Risk and Provide Safety  Recent Flowsheet Documentation  Taken 1/11/2023 0000 by Aileen Oviedo RN  Enhanced Safety Measures: bed alarm set  Intervention: Minimize and Manage Confusion  Recent Flowsheet Documentation  Taken 1/11/2023 0000 by Aileen Oviedo RN  Sensory Stimulation Regulation:    care clustered    lighting decreased    quiet environment promoted  Reorientation Measures:    clock in view    reorientation provided     Problem: Dysrhythmia  Goal: Normalized Cardiac Rhythm  Outcome: Not Progressing  Intervention: Monitor and Manage Cardiac Rhythm Effect    Goal: Patient-Specific Goal (Individualized)  Outcome: Progressing  Flowsheets (Taken 1/11/2023 0353)  Patient/Family-Specific Goals (Include Timeframe): Family conference today at 1200.  Goal: Absence of Hospital-Acquired Illness or Injury  Outcome: Progressing  Intervention: Identify and Manage Fall Risk  Recent Flowsheet Documentation  Taken 1/11/2023 0000 by Aileen Oviedo RN  Safety Promotion/Fall Prevention:    bed alarm on    fall prevention program maintained    nonskid shoes/slippers when out of bed    patient and family education    room door open  Intervention: Prevent and Manage VTE (Venous Thromboembolism) Risk  Recent Flowsheet Documentation  Taken 1/11/2023 0000 by Aileen Oviedo RN  VTE Prevention/Management: compression stockings off  Goal: Optimal Comfort and Wellbeing  Outcome: Progressing     Problem:  Risk for Delirium  Goal: Optimal Coping  Outcome: Progressing  Goal: Improved Behavioral Control  Outcome: Progressing  Intervention: Minimize Safety Risk  Recent Flowsheet Documentation  Taken 1/11/2023 0000 by Aileen Oviedo RN  Enhanced Safety Measures: bed alarm set  Goal: Improved Attention and Thought Clarity  Outcome: Progressing  Intervention: Maximize Cognitive Function  Recent Flowsheet Documentation  Taken 1/11/2023 0000 by Aileen Oviedo RN  Sensory Stimulation Regulation:    care clustered    lighting decreased    quiet environment promoted  Reorientation Measures:    clock in view    reorientation provided  Goal: Improved Sleep  Outcome: Progressing     Problem: Fall Injury Risk  Goal: Absence of Fall and Fall-Related Injury  Outcome: Progressing  Intervention: Identify and Manage Contributors  Recent Flowsheet Documentation  Taken 1/11/2023 0000 by Aileen Oviedo RN  Medication Review/Management:    medications reviewed    high-risk medications identified  Intervention: Promote Injury-Free Environment  Recent Flowsheet Documentation  Taken 1/11/2023 0000 by Aileen Oviedo RN  Safety Promotion/Fall Prevention:    bed alarm on    fall prevention program maintained    nonskid shoes/slippers when out of bed    patient and family education    room door open

## 2023-01-11 NOTE — PROGRESS NOTES
"[facility] (full note to follow)  Hgb is trending up to 8.5 (vs7.7). No overt signs of gi bleeding. No plans for EGD today, so no objection to clear liquids.   When I discussed with the patient's wife 1/10/23 (Lazara) she agreed with holding off on invasive procedures in the absence of life threatening bleeding. Will reassess later today.   Mirella John PA-C  [facility]   [phone]    GASTROENTEROLOGY PROGRESS NOTE     SUBJECTIVE   The patient reports feeling \"great!\" No abdominal pain. Tolerating clear liquids without nausea or vomiting. Expresses wishes to go home.      OBJECTIVE     Vitals Blood pressure 119/67, pulse 109, temperature 97.5  F (36.4  C), temperature source Oral, resp. rate 18, weight 73.5 kg (162 lb), SpO2 97 %.          Physical Exam   General: awake, alert, not able to state the year.     Cardiovascular: S1S2    Chest: lungs are clear     Abdomen: +bs, soft, not tender          LABORATORY    ELECTROLYTE PANEL   Recent Labs   Lab 01/09/23  1230      POTASSIUM 4.0   CHLORIDE 103   CO2 23   *   CR 1.02   BUN 24      HEMATOLOGY PANEL   Recent Labs   Lab 01/11/23  0759 01/10/23  1924 01/10/23  1038 01/09/23  2345 01/09/23  1230   HGB 8.5* 7.7* 7.4* 6.3* 7.9*   MCV  --   --  81 80 79   WBC  --   --  17.4* 16.3* 21.4*   PLT  --   --  274 272 362      LIVER AND PANCREAS PANEL   Recent Labs   Lab 01/09/23  1230   AST 29   ALT 21   ALKPHOS 205*   BILITOTAL 0.8     IMAGING STUDIES    EXAM: CTA ABDOMEN PELVIS WITH CONTRAST  LOCATION: [facility]  DATE/TIME: 1/9/2023 3:58 PM     INDICATION: Anemia. GI bleed. Anticoagulation. Sepsis.  COMPARISON: 8/17/2022, 5/27/2020  TECHNIQUE: CT angiogram abdomen pelvis during arterial phase of injection of IV contrast. 2D and 3D MIP reconstructions were performed by the CT technologist. Dose reduction techniques were used.  CONTRAST: Isovue 370 90mL     FINDINGS:  ANGIOGRAM ABDOMEN/PELVIS: There was suboptimal " opacification of the arteries due to contrast extravasation during the injection. No active contrast extravasation into the bowel or definite evidence of GI bleeding. However, sensitivity is significantly   limited. Moderate calcified plaque in the abdominal aorta. The mesenteric and renal arteries are patent. Iliac arteries are patent.     LOWER CHEST: A small right pleural effusion is unchanged from 8/17/2022. Rounded atelectasis in the right lower lobe posterior medially is also unchanged.     HEPATOBILIARY: There are innumerable peripherally enhancing masses throughout the liver measuring up to 3.0 cm in diameter which are new from 8/17/2022 and highly suspicious for malignancy. The pattern is atypical for lymphoma and other etiologies should   be considered.     PANCREAS: Diffuse pancreatic calcification. Soft tissue fullness in the pancreatic head measuring approximately 3 cm.     SPLEEN: Normal.     ADRENAL GLANDS: Normal.     KIDNEYS/BLADDER: Small benign left renal cyst. No follow-up needed. Few intrarenal calcifications measuring up to 3 mm may represent arterial calcification and/or small nonobstructing calyceal stones. No ureteral stones or hydronephrosis.     BOWEL: The stomach is decompressed. The small and large intestines are normal caliber. Few sigmoid diverticula without active inflammation.     LYMPH NODES: No adenopathy.     PELVIC ORGANS: Moderate enlargement the prostate gland.     MUSCULOSKELETAL: Degenerative changes in the lumbar spine.                                                                      IMPRESSION:  1.  The study is significantly limited by contrast extravasation during the injection with reduced opacification of the arterial system. No definite evidence of active GI bleeding.  2.  There are innumerable peripherally enhancing masses throughout the liver measuring up to 3.0 cm in diameter. These are new from 8/17/2022 and are highly suspicious for malignancy. The pattern is  atypical for lymphoma and other etiologies should be   considered.  3.  There is soft tissue fullness in the pancreatic head measuring approximately 3 cm. This is indeterminate and could represent a primary pancreatic malignancy or metastasis. This finding is new from 2020 and not adequately imaged on the most recent   chest CT. An MRI of the pancreas without and with IV gadolinium can be considered for further evaluation.  4.  Diffuse pancreatic calcification consistent with chronic pancreatitis.  5.  Small right pleural effusion and rounded atelectasis in the right lower lobe are unchanged from 8/17/2022.       I have reviewed the current diagnostic and laboratory tests.              IMPRESSION   Acute anemia, questionable gi blood loss anemia-- This is a 87 year old year old male with history of thalamic CVA, atrial fibrillation (on Elquis and Plavix), hypertension, hyperlipidemia, and large cell lymphoma treated 3 years ago through [facility] who presented to the ER with increasing confusion and generalized weakness dating back to 12/24/22. Laboratory evaluation revealed leuokocytosis and anemia (hgb down to 6.3 and MCV 79 vs hgb 15 1 year ago). BUN is not significantly elevated to support upper gi bleeding. CTA abdomen and pelvis did not reveal active gi bleeding.   Possible etiologies to account for anemia include anemia of chronic disease, iron deficiency, vitamin B12 and folate deficiency as well as possible gi blood loss in the setting of Plavix and Eliquis (FOBT is positive, but no overt signs of gi bleeding).   Upper endoscopy to evaluate for source of gi bleeding (esophagitis, PUD, duodenitis, AVMs, and malignancy) was considered 1/10/23, but deferred given absence of life threatening bleeding. The patient's wife was in favor of holding off on invasive procedures per discussion 1/10/23.     Liver masses and soft tissue fullness in the pancreatic head on CTA-- these findings would be atypical for  lymphoma, but highly suspicious for malignancy. Could consider additional evaluation via MRI or possible outpatient EUS, but recommend clarification of care goals.          PLAN   Continue PPI.   Will await for additional input re: goals of care (family conference was planned for today).  Addendum: Agree with plans for consultation with hospice. [facility] will sign off. Call with questions or concerns.         Mirella John PA-C  Thank you for the opportunity to participate in the care of this patient.   Please feel free to call me with any questions or concerns.  Phone number [phone].

## 2023-01-11 NOTE — PROGRESS NOTES
[facility]    Progress Note - Hospitalist Service       Date of Admission:  1/9/2023    Assessment & Plan   Son Huizar is a 87 year old male admitted on 1/9/2023. He has a past medical history of atrial fibrillation, essential hypertension, hyperlipidemia, lymphoma, CVA, dementia, and thalamic infarction. He is admitted for increased weakness, incontinence, and confusion. He is newly diagnosed with anemia.     Anemia, unspecified  Anticoagulated due to atrial fibrillation  Hemoglobin was 7.9 on admission, decreased to 6.3. 1U PRBC ordered and administered. Hemoccult positive. CTA of abdomen/pelvis not indicative of active bleed.   - Hold Plavix and Eliquis  - GI consultation, appreciate recs and cares   - Recommended PPI BID   - Patient and family do not want invasive procedures performed at this time, given there is no evidence of acute bleed.   - Regular adult diet  - Hgb checks every 8 hours     Sepsis rule out  Patient oriented to self only.  Elevated white blood count to 21.4, improved to 16.3. Patient given 2500 mL bolus. Lactic acid is 2.4 on recheck. Chest x-ray and CTA of abdomen and pelvis showing no signs of infection.  UA showing no infection.  Given 1 g ceftriaxone in the emergency department.  It is possible that WBC elevation is due to lymphoma. Low concern for sepsis at this time.   - Blood cultures pending. No growth to date     Confusion  According to daughter and wife, patient at baseline has some dementia, but is normally oriented to person, place, and time. Patient is oriented only to self. Differential includes prior CVAs vs developing Alzheimer's disease vs progression of cancer. Likely multifactorial.  - SLUMS score: 3/30     Diffuse large B cell lymphoma  Liver mass, newly diagnosed  Pancreatic mass, newly diagnosed  Follows with [facility], Dr. Ross. Discussed care with NARCISO Reyna with [facility]. Soft tissue fullness in the pancreatic head  measuring approximately 3 cm.  Innumerable peripheral enhancing masses throughout the liver measuring up to 3 cm.  Both are highly suspicious of malignancy.  - Care conference with family held today where it was decided that hospice care is likely the best option for patient.  See below for details of care conference.  - Hospice consult placed    Atrial fibrillation  History of CVA and thalamic infarction  CT head without contrast showing chronic lacunar type infarcts involving the thalamus and posterior limb of the right internal capsule, cerebral volume loss which is consistent with Alzheimer's dementia, and moderate chronic small vessel ischemic disease.  - Continue metoprolol tartrate 25 mg daily  - Holding Plavix and Eliquis.  May restart tomorrow if hemoglobin remains stable/improved     Hyperlipidemia  - Continue simvastatin 40 mg at bedtime     Chronic hypomagnesemia  On admission, magnesium is 1.8.  - PTA magnesium 200 mg daily     Care conference on 1/11  Myself, Dina Lopez NP from [facility], patient, daughter (Maddi), son-in-law, wife (Marisa) were all present for this discussion.  Initially, we discussed the patient's anemia and reported that since his blood transfusion yesterday his hemoglobin seems to be improving.  An EGD will likely not need to be performed while the patient is hospitalized if his hemoglobin continues to trend in the right direction.  If patient has new symptoms or laboratory findings consistent with acute blood loss, this could change and an EGD may be indicated.  However, at this time we are hopeful that his hemoglobin continues to improve.  Regarding the patient's malignancy, Dina explained that this malignancy is likely different than his previous lymphoma and that treatment would be more palliative than curative if it were to be pursued.  Patient was adamant about not receiving any chemotherapy, even if it were his only option.  He inquired about possible surgery or  "alternative therapies, but it was explained to the patient that given the rapid onset, liver and pancreas involvement, and number of masses, surgery would likely not be effective and alternative treatments other than chemo also would likely be ineffective.  Regarding biopsy for diagnostic purposes, it was explained to the patient that there is a risk associated with biopsy, and if the patient was not going to pursue treatment then the risks would outweigh the benefit of having a biopsy performed.  After hearing all of the above information, the family decided that hospice care was the best option for Son's future and a consult was made.     Diet: Regular Diet Adult    DVT Prophylaxis: DOAC deferred until hemoglobin stabilizes  Ghotra Catheter: Not present  Fluids: PO  Lines: None     Cardiac Monitoring: None  Code Status: Full Code      Clinically Significant Risk Factors      # Hypercalcemia: corrected calcium is >10.1, will monitor as appropriate    # Hypoalbuminemia: Lowest albumin = 2.9 g/dL at 1/9/2023 12:30 PM, will monitor as appropriate                  Disposition Plan   Expected Discharge Date: 01/12/2023      Destination: Hospice         The patient's care was discussed with the Attending Physician, Dr. Abelardo Alvarez MD.    Subhash Mar DO  Hospitalist Service  [facility]  Securely message with [employer] (more info)  Text page via [facility] Paging/Directory   ______________________________________________________________________    Interval History   She was laying comfortably in bed when I saw him earlier this morning.  He stated he \"wanted to get out of here.\"  Patient was less agitated overnight, per nursing.  Stated he was hungry and denied any pain or other complaints.    Physical Exam   Vital Signs: Temp: 98  F (36.7  C) Temp src: Oral BP: 100/52 Pulse: 80   Resp: 18 SpO2: 97 % O2 Device: None (Room air)    Weight: 162 lbs 0 oz  General appearance: laying in bed comfortably, in " no distress, pale appearing  Head: normocephalic, without obvious abnormalities  Eyes: conjunctivae/corneas clear.  Corrective glasses in place  Ears: hearing grossly intact  Nose: nares normal, no drainage  Throat: lips, mucosa, and tongue normal, teeth and gums normal  Lung: clear to auscultation bilaterally, no wheezing, coughing, or use of accessory muscles  Heart: regular rate and rhythm, S1, S2 normal, no murmur, click, rub, or gallop  Abdomen: limited by obesity, soft, non-tender, bowel sounds present, no masses or organomegaly  Extremities: warm, dry, atraumatic, no cyanosis  Skin: pale appearing, texture, and turgor. No rashes or lesions  Neurologic: sensation grossly intact in bilateral lower extremities with no pain to palpation  Psychologic: orientated only to self       Data     I have personally reviewed the following data over the past 24 hrs:    N/A  \   8.5 (L)   / N/A     N/A N/A N/A /  N/A   N/A N/A N/A \       Imaging results reviewed over the past 24 hrs:   No results found for this or any previous visit (from the past 24 hour(s)).

## 2023-01-11 NOTE — PLAN OF CARE
Problem: Risk for Delirium  Goal: Improved Attention and Thought Clarity  Intervention: Maximize Cognitive Function  Recent Flowsheet Documentation  Taken 1/11/2023 1630 by Obi Grullon RN  Reorientation Measures: calendar in view   Goal Outcome Evaluation:      Plan of Care Reviewed With: patient    Overall Patient Progress: improvingOverall Patient Progress: improving  Pt is alert but forgetful. Disoriented to time place and situation.   VSS, on room air. Able to voice needs. Has not complained of pain yet. Pt is double incontinent. Awaiting for a hospice consult. For transfer to .

## 2023-01-11 NOTE — PLAN OF CARE
Goal: Optimal Comfort and Wellbeing  Outcome: Progressing     Patient disoriented to time and situation. Denies pain. AM Hgb increased to 8.5, EGD canceled. Diet advanced, patient tolerating. Care conference completed with family, hospice consult placed. VSS.

## 2023-01-11 NOTE — PROGRESS NOTES
"   01/11/23 0850   Appointment Info   Signing Clinician's Name / Credentials (OT) Gary Willy, OTR/L   Living Environment   People in Home spouse   Current Living Arrangements house  (Per Pt report can live on one level.)   Living Environment Comments Per Pt report:  WIS with shower chair, Std toilet with GB   Self-Care   Usual Activity Tolerance other (see comments)  (Chart reports Pt's wife is helping him with all ADLs and IADLs.  Pt reports he is indep.)   Current Activity Tolerance moderate   Equipment Currently Used at Home cane, straight;walker, rolling   Instrumental Activities of Daily Living (IADL)   IADL Comments Wife cares for Pt and household duties per chart   General Information   Onset of Illness/Injury or Date of Surgery 01/09/23   Referring Physician Dr. Abelardo Alvarez   Patient/Family Therapy Goal Statement (OT) \"I want to get the hell out of here\"   Additional Occupational Profile Info/Pertinent History of Current Problem Adm with increased confusion and weakness.  Pancreatic and liver masses.  GI Bleed.  PMH:  CVA, lymphoma, GI bleed, A-fib, HTN, Hyperllip.   Cognitive Status Examination   Orientation Status person   Behavioral Issues overwhelmed easily;uncooperative;verbal outbursts   Affect/Mental Status (Cognitive) agitated   Follows Commands follows one-step commands;0-24% accuracy   Cognitive Screens/Assessments   Cognitive Assessments Completed [facility] Mental Status Exam (UMS):  Total Score out of /30 3/30   [facility] Norms 1-20 equals dementia   [facility] Domains assessed: orientation, memory, attention, executive functions   SLUMS Interpretation Pt easily overwhelmed with cognitive questions.  Needed several breaks in order to complete the SLUMS.  Eye glasses on during this screen.  Unknown Pt's educational background.  At this time, Pt has decreased cognition in the area of dementia.  Will need 24 hour care.   Visual Perception   Visual Impairment/Limitations corrective " lenses full-time   Range of Motion Comprehensive   General Range of Motion upper extremity range of motion deficits identified   General Upper Extremity Assessment (Range of Motion)   Comment: Upper Extremity ROM Limited to 90 degrees.   Upper Extremity: Range of Motion shoulder, left: UE ROM;shoulder, right: UE ROM   Bed Mobility   Bed Mobility supine-sit;sit-supine   Supine-Sit [place] (Bed Mobility) contact guard   Sit-Supine [place] (Bed Mobility) contact guard   Assistive Device (Bed Mobility) bed rails   Comment (Bed Mobility) Sat EOB for <1 min.  returned to supine without instruction to do so.   Transfers   Transfers bed-chair transfer   Transfer Comments Pt declined to stand and trsf to the chair.   Balance   Balance Assessment standing balance: static   Balance Comments Moderate.   Activities of Daily Living   BADL Assessment/Intervention   (declined.)   Clinical Impression   Criteria for Skilled Therapeutic Interventions Met (OT) Yes, treatment indicated   OT Diagnosis decreased indep with ADLs.   OT Problem List-Impairments impacting ADL problems related to;cognition;mobility   Assessment of Occupational Performance 3-5 Performance Deficits   Identified Performance Deficits Cognition, dressing, trsfs, toileting, bathing, G/H   Planned Therapy Interventions (OT) ADL retraining;cognition   Clinical Decision Making Complexity (OT) moderate complexity   Risk & Benefits of therapy have been explained evaluation/treatment results reviewed;participants included;patient   Clinical Impression Comments Pt not able to comprehend info re: OT assessment.   OT Total Evaluation Time   OT Eval, Moderate Complexity Minutes (60106) 15   OT Goals   Therapy Frequency (OT) Daily   OT Predicted Duration/Target Date for Goal Attainment 01/18/23   OT Goals Lower Body Dressing;Transfers;Toilet Transfer/Toileting;Cognition   OT: Lower Body Dressing Moderate assist   OT: Transfer Moderate assist   OT: Toilet  Transfer/Toileting Moderate assist   OT: Cognitive Patient/caregiver will verbalize understanding of cognitive assessment results/recommendations as needed for safe discharge planning  (with VC as needed.)   Interventions   Interventions Quick Adds Self-Care/Home Management   Self-Care/Home Management   Self-Care/Home Mgmt/ADL, Compensatory, Meal Prep Minutes (99560) 15   Symptoms Noted During/After Treatment (Meal Preparation/Planning Training) increased pain  (neck pain)   Treatment Detail/Skilled Intervention Pt in bed when therapist arrived.  Reviewed reason for OT eval and explained his MD ordered OT to look at how he is doing with moving in the room and his thinking skills.  Pt was oriented to self only.  Was able to trsf from supine to EOB.  But only sat for <1 min and went back into a supine position.  c/o neck pain and therapist worked on adjusting bed position to decrease neck pain.  Worked on moving his arms which was limited to 80 degrees flex.  Pt declined any further activity requesting therapist leave the room but to give him something to dirink.  Verified with nursing that Pt could have water.  Pt was able to hold water glass and drink indep.  At end of session, Pt in bed with bed alarm on and call light within reach.   Isabel Level (Eating/Self-Feeding Training) stand-by assist   OT Discharge Planning   OT Plan Will further assess ADLs as able.  Will review info from family meeting today re: plan of care.  Basic ADLs and trsfs.  Daily R/O.   OT Discharge Recommendation (DC Rec) (S)  Long term care facility   OT Rationale for DC Rec Pt will need assist with all cares.  SLUMS score was 3/30 = dementia.   OT Brief overview of current status SLUMS 3/30.  Sat EOB with CGA of one.  Decline to trsf to chair.  Behaviors.   Total Session Time   Timed Code Treatment Minutes 15   Total Session Time (sum of timed and untimed services) 30

## 2023-01-11 NOTE — PLAN OF CARE
Goal Outcome Evaluation:                        Problem: Plan of Care - These are the overarching goals to be used throughout the patient stay.    Goal: Optimal Comfort and Wellbeing  1/10/2023 2223 by Ceci Alvarado RN  Outcome: Progressing  1/10/2023 2222 by Ceci Alvarado, RN  Outcome: Progressing   Iain has been cooperative and pleasant this shift.  He was not aggressive.  He is on clears and NPO at midnight for possible scop in the morning.  Family has a conference at noon tomorrow with palliative care and to discuss plan for Iain.

## 2023-01-12 NOTE — PROGRESS NOTES
[facility]    Progress Note - Hospitalist Service       Date of Admission:  1/9/2023    Assessment & Plan   Son Huizar is a 87 year old male admitted on 1/9/2023. He has a past medical history of atrial fibrillation, essential hypertension, hyperlipidemia, lymphoma, CVA, dementia, and thalamic infarction. He is admitted for increased weakness, incontinence, and confusion. He is newly diagnosed with anemia. CT scan patient received on admission found new malignancy in his liver and pancreas.  After discussion with family and oncology, family elected to place patient in hospice care.     Confusion  According to daughter and wife, patient at baseline has some dementia, but is normally oriented to person, place, and time. Patient is oriented only to self. Differential includes prior CVAs vs developing Alzheimer's disease vs progression of cancer. Likely multifactorial.  - SLUMS score: 3/30     Diffuse large B cell lymphoma  Liver mass, newly diagnosed  Pancreatic mass, newly diagnosed  Follows with [facility], Dr. Ross. Discussed care with NARCISO Reyna with [facility]. Soft tissue fullness in the pancreatic head measuring approximately 3 cm. Innumerable peripheral enhancing masses throughout the liver measuring up to 3 cm. Both are highly suspicious of malignancy.  - Due to poor slums score and patient being disoriented, he is unfit to make his own medical decisions at this time.  After discussion with family they would like patient to be DNR/I.  - Hospice consult placed  - Will discuss discontinuation of routine medications with family       Diet: Regular Diet Adult    DVT Prophylaxis: None  Ghotra Catheter: Not present  Fluids: PO  Lines: None     Cardiac Monitoring: None  Code Status: No CPR- Do NOT Intubate      Clinically Significant Risk Factors      # Hypercalcemia: corrected calcium is >10.1, will monitor as appropriate    # Hypoalbuminemia: Lowest albumin = 2.9 g/dL at  1/9/2023 12:30 PM, will monitor as appropriate                  Disposition Plan   Expected Discharge Date: 01/12/2023      Destination: Hospice         The patient's care was discussed with the Attending Physician, Dr. Abelardo Alvarez MD.    Subhash Mar DO  Hospitalist Service  [facility]  Securely message with [employer] (more info)  Text page via [employer] Paging/Directory   ______________________________________________________________________    Interval History   She was laying comfortably in bed when I saw him earlier this morning. He was excited that we are working on getting him out of the hospital. Patient was more agitated overnight, and received Zyprexa x1. Patient does not have any complaints at this time.    Physical Exam   Vital Signs: Temp: 97.4  F (36.3  C) Temp src: Oral BP: 99/54 Pulse: 87   Resp: 16 SpO2: 97 % O2 Device: None (Room air)    Weight: 162 lbs 0 oz  General appearance: laying in bed comfortably, in no distress, pale appearing  Head: normocephalic, without obvious abnormalities  Eyes: conjunctivae/corneas clear.  Corrective glasses in place  Ears: hearing grossly intact  Nose: nares normal, no drainage  Throat: lips, mucosa, and tongue normal, teeth and gums normal  Lung: clear to auscultation bilaterally, no wheezing, coughing, or use of accessory muscles  Heart: regular rate and rhythm, S1, S2 normal, no murmur, click, rub, or gallop  Abdomen: limited by obesity, soft, non-tender, bowel sounds present, no masses or organomegaly  Extremities: warm, dry, atraumatic, no cyanosis  Skin: pale appearing, texture, and turgor. No rashes or lesions  Neurologic: sensation grossly intact in bilateral lower extremities with no pain to palpation  Psychologic: orientated to self and place       Data     I have personally reviewed the following data over the past 24 hrs:    N/A  \   7.9 (L)   / N/A     N/A N/A N/A /  N/A   N/A N/A N/A \       Imaging results reviewed over the past  24 hrs:   No results found for this or any previous visit (from the past 24 hour(s)).

## 2023-01-12 NOTE — PLAN OF CARE
"Pt has been mostly cooperative during shift but can get frustrated easily. Care was clustered to provide times of rest and space out disruption. His appetite has been low throughout the shift and staff has been encouraging him to eat. No pain reported throughout shift. He is able to ambulate with assist x1-2, gaitbelt, and walker. Bed in lowest position, call light within reach, bed alarm armed.         Goal Outcome Evaluation:         Problem: Plan of Care - These are the overarching goals to be used throughout the patient stay.    Goal: Patient-Specific Goal (Individualized)  Description: You can add care plan individualizations to a care plan. Examples of Individualization might be:  \"Parent requests to be called daily at 9am for status\", \"I have a hard time hearing out of my right ear\", or \"Do not touch me to wake me up as it startles me\".  Outcome: Progressing  Goal: Optimal Comfort and Wellbeing  Outcome: Progressing     Problem: Fall Injury Risk  Goal: Absence of Fall and Fall-Related Injury  Outcome: Progressing  Intervention: Identify and Manage Contributors  Recent Flowsheet Documentation  Taken 1/12/2023 1641 by Pat King, RN  Medication Review/Management: medications reviewed  Taken 1/12/2023 1027 by Pat King, RN  Medication Review/Management: medications reviewed  Intervention: Promote Injury-Free Environment  Recent Flowsheet Documentation  Taken 1/12/2023 1641 by Pat King, RN  Safety Promotion/Fall Prevention:   activity supervised   bed alarm on   clutter free environment maintained   fall prevention program maintained   increased rounding and observation   lighting adjusted   nonskid shoes/slippers when out of bed   patient and family education   room near nurse's station  Taken 1/12/2023 1027 by Pat King, RN  Safety Promotion/Fall Prevention:   activity supervised   bed alarm on   clutter free environment maintained   fall prevention program maintained   increased rounding " and observation   lighting adjusted   nonskid shoes/slippers when out of bed   patient and family education   room near nurse's station     Problem: Confusion Chronic  Goal: Optimal Cognitive Function  Outcome: Progressing  Intervention: Minimize Injury Risk and Provide Safety  Recent Flowsheet Documentation  Taken 1/12/2023 1641 by Pat King, RN  Enhanced Safety Measures:   bed alarm set   room near unit station  Taken 1/12/2023 1027 by Pat King, RN  Enhanced Safety Measures:   bed alarm set   room near unit station

## 2023-01-12 NOTE — PROGRESS NOTES
Care Management Follow Up    Length of Stay (days): 3    Expected Discharge Date: 01/13/2023     Concerns to be Addressed: discharge planning, home safety (Wife unable to care for patient at home)     Patient plan of care discussed at interdisciplinary rounds: Yes    Anticipated Discharge Disposition: LTC     Anticipated Discharge Services: hospice  Anticipated Discharge DME: per treatment team     Patient/family educated on Medicare website which has current facility and service quality ratings:  yes  Education Provided on the Discharge Plan:  yes  Patient/Family in Agreement with the Plan: other (see comments) (Want TCU due to wife unable to care for patient)    Referrals Placed by CM/SW:  Post acute facilities   Private pay costs discussed: yes    Additional Information:      9:16 AM  SW reviewed chart.  Pt seeking LTC placement with hospice care.  Family planning to pay privately for placement and understanding of costs. [facility] reaching out to family this morning to discuss further.  SW left voice mails for pending LTC facilities.      12:28 PM  [facility] is reviewing.    1:06 PM  [facility] declined due to SLUMS and need for zyprexa over night.   Referrals pending at [facility].    VANESSA Flores

## 2023-01-12 NOTE — PLAN OF CARE
"  Problem: Plan of Care - These are the overarching goals to be used throughout the patient stay.    Goal: Optimal Comfort and Wellbeing  Outcome: Progressing     Problem: Fall Injury Risk  Goal: Absence of Fall and Fall-Related Injury  Outcome: Progressing  Intervention: Promote Injury-Free Environment  Recent Flowsheet Documentation  Taken 1/12/2023 0040 by Veronica Miranda RN  Safety Promotion/Fall Prevention:   activity supervised   bed alarm on   clutter free environment maintained   fall prevention program maintained   increased rounding and observation   lighting adjusted   nonskid shoes/slippers when out of bed   patient and family education   room near nurse's station     Problem: Confusion Chronic  Goal: Optimal Cognitive Function  Outcome: Progressing  Intervention: Minimize Injury Risk and Provide Safety  Recent Flowsheet Documentation  Taken 1/12/2023 0040 by Veronica Miranda RN  Enhanced Safety Measures:   bed alarm set   room near unit station     Problem: Muscle Strength Impairment  Goal: Improved Muscle Strength  Outcome: Progressing  Intervention: Optimize Muscle Strength  Recent Flowsheet Documentation  Taken 1/12/2023 0040 by Veronica Miranda RN  Activity Management: activity adjusted per tolerance   Goal Outcome Evaluation:    Pt agitated and frustrated tonight, setting off bed alarm multiple times. Wanting to ambulate to \"get out of here!\" Beginning to become combative with staff, able to redirect pt with multiple attempts. Ambulates with walker and GB, unsteady, mostly uncooperative with use of GB. Pt removed name band, and would not allow it to be replaced.  "

## 2023-01-13 NOTE — PLAN OF CARE
" Goal Outcome Evaluation:  Pt confused, forgetful and combative. Yelling at staff, or when alone in the room. Pt didn't let me finish his assessment. VSS. Room air. Alarm on. Call light within reach.       Problem: Plan of Care - These are the overarching goals to be used throughout the patient stay.    Goal: Plan of Care Review  Description: The Plan of Care Review/Shift note should be completed every shift.  The Outcome Evaluation is a brief statement about your assessment that the patient is improving, declining, or no change.  This information will be displayed automatically on your shift note.  Outcome: Progressing  Goal: Patient-Specific Goal (Individualized)  Description: You can add care plan individualizations to a care plan. Examples of Individualization might be:  \"Parent requests to be called daily at 9am for status\", \"I have a hard time hearing out of my right ear\", or \"Do not touch me to wake me up as it startles me\".  Outcome: Progressing  Goal: Absence of Hospital-Acquired Illness or Injury  Outcome: Progressing  Intervention: Identify and Manage Fall Risk  Recent Flowsheet Documentation  Taken 1/13/2023 0210 by Nimco Luong RN  Safety Promotion/Fall Prevention:   activity supervised   bed alarm on   clutter free environment maintained   fall prevention program maintained   increased rounding and observation   lighting adjusted   nonskid shoes/slippers when out of bed   patient and family education   room near nurse's station  Goal: Optimal Comfort and Wellbeing  Outcome: Progressing  Goal: Readiness for Transition of Care  Outcome: Progressing     Problem: Risk for Delirium  Goal: Optimal Coping  Outcome: Progressing  Goal: Improved Behavioral Control  Outcome: Progressing  Intervention: Minimize Safety Risk  Recent Flowsheet Documentation  Taken 1/13/2023 0210 by Nimco Luong RN  Enhanced Safety Measures: bed alarm set  Goal: Improved Attention and Thought Clarity  Outcome: " Progressing  Goal: Improved Sleep  Outcome: Progressing     Problem: Fall Injury Risk  Goal: Absence of Fall and Fall-Related Injury  Outcome: Progressing  Intervention: Identify and Manage Contributors  Recent Flowsheet Documentation  Taken 1/13/2023 0210 by Nimco Luong RN  Medication Review/Management: medications reviewed  Intervention: Promote Injury-Free Environment  Recent Flowsheet Documentation  Taken 1/13/2023 0210 by Nimco Luong RN  Safety Promotion/Fall Prevention:   activity supervised   bed alarm on   clutter free environment maintained   fall prevention program maintained   increased rounding and observation   lighting adjusted   nonskid shoes/slippers when out of bed   patient and family education   room near nurse's station     Problem: Confusion Chronic  Goal: Optimal Cognitive Function  Outcome: Progressing  Intervention: Minimize Injury Risk and Provide Safety  Recent Flowsheet Documentation  Taken 1/13/2023 0210 by Nimco Luong RN  Enhanced Safety Measures: bed alarm set     Problem: Dysrhythmia  Goal: Normalized Cardiac Rhythm  Outcome: Progressing     Problem: Anemia  Goal: Anemia Symptom Improvement  Outcome: Progressing  Intervention: Monitor and Manage Anemia  Recent Flowsheet Documentation  Taken 1/13/2023 0210 by Nimco Luong RN  Safety Promotion/Fall Prevention:   activity supervised   bed alarm on   clutter free environment maintained   fall prevention program maintained   increased rounding and observation   lighting adjusted   nonskid shoes/slippers when out of bed   patient and family education   room near nurse's station     Problem: Muscle Strength Impairment  Goal: Improved Muscle Strength  Outcome: Progressing

## 2023-01-13 NOTE — PROGRESS NOTES
Care Management Follow Up    Length of Stay (days): 4    Expected Discharge Date: 01/14/2023     Concerns to be Addressed: discharge planning, home safety (Wife unable to care for patient at home)     Patient plan of care discussed at interdisciplinary rounds: Yes    Anticipated Discharge Disposition: Home, Home Care, Skilled Nursing Facility, Transitional Care     Anticipated Discharge Services: Other (see comment) (Family wants TCU to LTC)  Anticipated Discharge DME: Other (see comment) (Pending clinical progress)    Patient/family educated on Medicare website which has current facility and service quality ratings:  yes  Education Provided on the Discharge Plan:  yes  Patient/Family in Agreement with the Plan: other (see comments) (Want TCU due to wife unable to care for patient)    Referrals Placed by CM/SW:  Post acute care facilities   Private pay costs discussed: private room/amenity fees    Additional Information:    11:22 AM  SW received phone call from Pt's daughter, Maddi requesting updates on placement.  KATYA discussed updates.  Maddi requests referral to [facility].  SW sent referral.  SW asked about sending additional referrals, Maddi is in agreement with sending to any facilities in the [facility] with request to prioritize facilities as close to [place] as possible.  SW sent additional LTC referrals.     3:48 PM  SW received a call from Pt's son in law, Abelardo.  Abelardo indicates that Pt has been accepted at [facility].  Family put a down payment down today and is signing paperwork with plan to admit next week.  KATYA requested that Abelardo updates CM when move in date is confirmed.     VANESSA Flores

## 2023-01-13 NOTE — PROGRESS NOTES
[facility]    Progress Note - Hospitalist Service       Date of Admission:  1/9/2023    Assessment & Plan   Son Huizar is a 87 year old male admitted on 1/9/2023. He has a past medical history of atrial fibrillation, essential hypertension, hyperlipidemia, lymphoma, CVA, dementia, and thalamic infarction. He is admitted for increased weakness, incontinence, and confusion. He is newly diagnosed with anemia. CT scan patient received on admission found new malignancy in his liver and pancreas.  After discussion with family and oncology, family elected to place patient in hospice care.     Diffuse large B cell lymphoma  Liver mass, newly diagnosed  Pancreatic mass, newly diagnosed  Follows with [facility], Dr. Ross. Discussed care with NARCISO Reyna with [facility]. Soft tissue fullness in the pancreatic head measuring approximately 3 cm. Innumerable peripheral enhancing masses throughout the liver measuring up to 3 cm. Both are highly suspicious of malignancy.  - Due to poor slums score and patient being disoriented, he is unfit to make his own medical decisions at this time.  After discussion with family they would like patient to be DNR/I.  - Hospice consult placed   - Patient has been accepted at hospice.  Is scheduled to be admitted sometime next week.       Diet: Room Service  Regular Diet Adult    DVT Prophylaxis: None  Ghotra Catheter: Not present  Fluids: PO  Lines: None     Cardiac Monitoring: None  Code Status: No CPR- Do NOT Intubate      Disposition Plan   Expected Discharge Date: 01/12/2023      Destination: Hospice         The patient's care was discussed with the Attending Physician, Dr. Abelardo Alvarez MD.    Subhash Mar DO  Hospitalist Service  [facility]  Securely message with [employer] (more info)  Text page via [employer] Paging/Directory   ______________________________________________________________________    Interval History   Patient was  "sitting upright in his chair eating breakfast during my encounter today.  Patient's daughter Maddi was also present during the visit.  Iain states he was feeling okay and was not in any pain.  States \"I do not feel great, but I also do not feel worse.\"  Maddi noted that her  is working on finding him placement.    Physical Exam   Vital Signs: Temp: 98  F (36.7  C) Temp src: Oral BP: 97/61 Pulse: 80   Resp: 16 SpO2: 95 % O2 Device: None (Room air)    Weight: 160 lbs 7.92 oz  General appearance:  Sitting comfortably in chair, in no distress, pale appearing  Head: normocephalic, without obvious abnormalities  Eyes: conjunctivae/corneas clear.  Corrective glasses in place  Ears: hearing grossly intact  Nose: nares normal, no drainage  Throat: lips, mucosa, and tongue normal, teeth and gums normal  Lung: clear to auscultation bilaterally, no wheezing, coughing, or use of accessory muscles  Heart: regular rate and rhythm, S1, S2 normal, no murmur, click, rub, or gallop  Abdomen: limited by obesity, soft, non-tender, bowel sounds present, no masses or organomegaly  Extremities: warm, dry, atraumatic, no cyanosis  Skin: pale appearing, texture, and turgor. No rashes or lesions  Neurologic: sensation grossly intact in bilateral lower extremities with no pain to palpation  Psychologic: orientated to self and place       Data         Imaging results reviewed over the past 24 hrs:   No results found for this or any previous visit (from the past 24 hour(s)).  "

## 2023-01-14 NOTE — PLAN OF CARE
Problem: Risk for Delirium  Goal: Improved Attention and Thought Clarity  Outcome: Progressing  Intervention: Maximize Cognitive Function  Recent Flowsheet Documentation  Taken 1/14/2023 0351 by Rayna Beckwith, RN  Sensory Stimulation Regulation:   care clustered   lighting decreased   quiet environment promoted  Reorientation Measures:   clock in view   calendar in view  Taken 1/13/2023 2045 by Rayna Beckwith, RN  Sensory Stimulation Regulation:   care clustered   lighting decreased   quiet environment promoted  Reorientation Measures:   clock in view   calendar in view     Problem: Risk for Delirium  Goal: Improved Sleep  Outcome: Progressing     Goal Outcome Evaluation:    Patient is A&O to self only. Was very confused, agitated and angry tonight. Clustered cares in order to allow uninterrupted sleep. Denied pain throughout the night. Up with assist of 2 walker and gait belt. Regular diet. Potential discharge to [facility] on Tuesday 01/17/2023. Slept well majority of the night.

## 2023-01-14 NOTE — PLAN OF CARE
Problem: Plan of Care - These are the overarching goals to be used throughout the patient stay.    Goal: Optimal Comfort and Wellbeing  Outcome: Adequate for Care Transition     Problem: Risk for Delirium  Goal: Improved Behavioral Control  Outcome: Adequate for Care Transition  Intervention: Minimize Safety Risk   Goal Outcome Evaluation:  Pt pleasant and cooperative throughout this shift.  He has been very sleepy today.  He had multiple visitors throughout the day.  Pt able to ambulate to the restroom, A1 with gait belt and walker.  Soft BP's noted.

## 2023-01-14 NOTE — PROGRESS NOTES
[facility]    Progress Note - [facility] Residency Service   Date of Admission:  1/9/2023    Assessment & Plan   Son Huizar is a 87 year old male admitted on 1/9/2023. He has a past medical history of atrial fibrillation, essential hypertension, hyperlipidemia, lymphoma, CVA, dementia, and thalamic infarction. He is admitted for increased weakness, incontinence, and confusion. He is newly diagnosed with anemia. CT scan patient received on admission found new malignancy in his liver and pancreas.  After discussion with family and oncology, family elected to place patient in hospice care.     Diffuse large B cell lymphoma  Liver mass, newly diagnosed  Pancreatic mass, newly diagnosed  Follows with [facility], Dr. Ross. Discussed care with NARCISO Reyna with [facility]. Soft tissue fullness in the pancreatic head measuring approximately 3 cm. Innumerable peripheral enhancing masses throughout the liver measuring up to 3 cm. Both are highly suspicious of malignancy.  - Due to poor slums score and patient being disoriented, he is unfit to make his own medical decisions at this time.  After discussion with family they would like patient to be DNR/I.  - Hospice consult placed   - Patient has been accepted at hospice.  Is scheduled to be admitted sometime next week.       Diet: Room Service  Regular Diet Adult    DVT Prophylaxis: None  Ghotra Catheter: Not present  Fluids: PO  Lines: None     Cardiac Monitoring: None  Code Status: No CPR- Do NOT Intubate      Disposition Plan   Expected Discharge Date: 01/17/2023      Destination: Hospice         The patient's care was discussed with the Attending Physician, Dr. Jefferson.    Anand Collins MD PGY2  Hospitalist Service  [facility]  Securely message with [employer] (more info)  Text page via [employer] Paging/Directory   ______________________________________________________________________    Interval History   Agitated  overnight, though no PRN Zyprexa needed. Slept well overnight. Eating well. No nausea or abdominal pain. No trouble breathing. Family at bedside, all questions answered.     Physical Exam   Vital Signs: Temp: 98  F (36.7  C) Temp src: Oral BP: 97/59 Pulse: 93   Resp: 16 SpO2: 95 % O2 Device: None (Room air)    Weight: 159 lbs 9.81 oz  General appearance: Laying comfortably in bed, in no distress, pale appearing  Head: normocephalic, without obvious abnormalities  Eyes: conjunctivae/corneas clear.  Corrective glasses in place  Ears: hearing grossly intact  Nose: nares normal, no drainage  Throat: lips, mucosa, and tongue srini  Lung: clear to auscultation bilaterally, no wheezing, coughing, or use of accessory muscles  Heart: regular rate and rhythm, S1, S2 normal, no murmur, click, rub, or gallop  Abdomen: soft, non-tender, bowel sounds present, no masses   Extremities: warm, dry, atraumatic, no cyanosis  Skin: pale appearing, texture, and turgor. No rashes or lesions  Neurologic:  Moves all extremities spontaneously.   Psychologic: orientated to self        Data         Imaging results reviewed over the past 24 hrs:   No results found for this or any previous visit (from the past 24 hour(s)).

## 2023-01-15 NOTE — PROGRESS NOTES
Care Management Follow Up    Length of Stay (days): 6    Expected Discharge Date: 01/17/2023     Concerns to be Addressed: discharge planning       Patient plan of care discussed at interdisciplinary rounds: Yes    Anticipated Discharge Disposition:  facility TBD with [facility]     Anticipated Discharge Services:  Hospice    Anticipated Discharge DME:  TBD      Patient/Family in Agreement with the Plan: yes    Referrals Placed by CM/SW:  Post acute care      Additional Information:  Chart reviewed. CM spoke to Maddi (daughter). [facility] ([facility]) - Staff coming to assess and determine if patient is appropriate. 2:29 PM   Maddi (daughter) indicated that her  may have spoken to [facility]. Per Maddi (daughter) they family is interested in hospice. Transportation will most likely need to arranged. CM will follow.       Hortencia Caal RN

## 2023-01-15 NOTE — PLAN OF CARE
Problem: Risk for Delirium  Goal: Improved Behavioral Control  Intervention: Minimize Safety Risk  Problem: Plan of Care - These are the overarching goals to be used throughout the patient stay.    Goal: Absence of Hospital-Acquired Illness or Injury  Intervention: Identify and Manage Fall Risk   Goal Outcome Evaluation:  Pt pleasant and cooperative throughout this shift.  Pt A&Ox1, VSS, soft BP's noted.  Pt having some difficulty urinating today, although he doesn't take in much orally.  Transfers seem to be more difficult today than they were the past 2 days.

## 2023-01-15 NOTE — PLAN OF CARE
Iain has been struggling to get his requested meals due to his listed yeast allergy. He is only allergic to John's yeast. Because of this, I deleted the yeast allergy from his list to allow him to get bread products from the kitchen. Per family, he eats breads at home.

## 2023-01-15 NOTE — PROGRESS NOTES
[facility]    Progress Note - [facility] Residency Service   Date of Admission:  1/9/2023    Assessment & Plan   Son Huizar is a 87 year old male admitted on 1/9/2023. He has a past medical history of atrial fibrillation, essential hypertension, hyperlipidemia, lymphoma, CVA, dementia, and thalamic infarction. He is admitted for increased weakness, incontinence, and confusion. He is newly diagnosed with anemia. CT scan patient received on admission found new malignancy in his liver and pancreas.  After discussion with family and oncology, family elected to place patient in hospice care.     Diffuse large B cell lymphoma  Liver mass, newly diagnosed  Pancreatic mass, newly diagnosed  Follows with [facility], Dr. Ross. Discussed care with NARCISO Reyna with [facility]. Innumerable peripheral enhancing masses throughout the liver and pancreas measuring up to 3 cm.   - Due to poor slums score and patient being disoriented, he is unfit to make his own medical decisions at this time.    - After discussion with family they would like patient to be DNR/I.  - Hospice consult placed   - Patient has been accepted at hospice.  Is scheduled to be admitted sometime next week.       Diet: Room Service  Regular Diet Adult    DVT Prophylaxis: None  Ghotra Catheter: Not present  Fluids: PO  Lines: None     Cardiac Monitoring: None  Code Status: No CPR- Do NOT Intubate      Disposition Plan   Expected Discharge Date: 01/17/2023      Destination: Hospice         The patient's care was discussed with the Attending Physician, Dr. Jefferson.    Benita Kay Behm, MD PGY3  Hospitalist Service  [facility]  Securely message with [employer] (more info)  Text page via [employer] Paging/Directory   ______________________________________________________________________    Interval History   No acute events overnight.  Patient resting comfortably in bed.  Awaiting hospice house placement, family  found a bed for him just getting paperwork completed.     Physical Exam   Vital Signs: Temp: 99  F (37.2  C) Temp src: Oral BP: 102/63 Pulse: 94   Resp: 26 SpO2: 91 % (fluctuates between 91-96% while sleeping) O2 Device: None (Room air)    Weight: 159 lbs 9.81 oz  General appearance: Laying comfortably in bed, in no distress, pale appearing  Head: normocephalic, without obvious abnormalities  Eyes: conjunctivae/corneas clear.  Corrective glasses in place  Ears: hearing grossly intact  Nose: nares normal, no drainage  Throat: lips, mucosa, and tongue srini  Lung: clear to auscultation bilaterally, no wheezing, coughing, or use of accessory muscles  Heart: regular rate and rhythm, S1, S2 normal, no murmur, click, rub, or gallop  Abdomen: soft, non-tender, bowel sounds present, no masses   Extremities: warm, dry, atraumatic, no cyanosis  Skin: pale appearing, texture, and turgor. No rashes or lesions  Neurologic:  Moves all extremities spontaneously.   Psychologic: orientated to self when awake       Data         Imaging results reviewed over the past 24 hrs:   No results found for this or any previous visit (from the past 24 hour(s)).

## 2023-01-15 NOTE — PLAN OF CARE
Problem: Risk for Delirium  Goal: Improved Behavioral Control  Intervention: Minimize Safety Risk  Problem: Fall Injury Risk  Goal: Absence of Fall and Fall-Related Injury  Outcome: Adequate for Care Transition  Intervention: Identify and Manage Contributors  Intervention: Promote Injury-Free Environment   Goal Outcome Evaluation:  Pt pleasant and cooperative throughout this shift.  He had some a bout or two of frustration due to thinking he was in the restroom and having soiled his brief.  Pt was easily redirectable with reassurance, pt then became a little emotional, a little teary eyed.

## 2023-01-15 NOTE — PLAN OF CARE
Problem: Risk for Delirium  Goal: Optimal Coping  Outcome: Progressing    Problem: Confusion Chronic  Goal: Optimal Cognitive Function  Intervention: Minimize Injury Risk and Provide Safety  Recent Flowsheet Documentation  Taken 1/15/2023 0035 by Veronica Fletcher RN  Enhanced Safety Measures: bed alarm set   Pt oriented to self. Very pleasant during shift. Refusing some cares but overall cooperative and redirectable. Pt retaining some urine and bladder [facility] for >400 mL[facility] (PVR 38 mL). Q2 turns.

## 2023-01-16 PROBLEM — K86.89 PANCREATIC MASS: Chronic | Status: ACTIVE | Noted: 2023-01-01

## 2023-01-16 NOTE — SIGNIFICANT EVENT
Significant Event Note - RRT for stroke code     Time of event: 10:30 PM January 15, 2023    Description of event:   - Left UE weakness noticed when transfering to bedside commode  - 5PM RN notes earlier - Transfers seem to be more difficult today than they were the past 2 days.    - Off Eliquis and Plavix since admission Jan 9 due to GIB requiring PRBC transfusion - Hgb as low as 6+    - He is awake, able to follow commands, not slurred. Able to see me. Denies visual field cuts. No visual loss   - While applying 18G on RUE - he was withdrawing/resisting. No noticeable movement on left UE.  - NIHSS score - Limited given his intermittent agitation, dementia, and unable to follow consistently --- NIHSS approx 6-7     Limited Neurologic exams:  Awake and alert, Pupils 2-3 mm, EOMS full and equal, can close/open eyes, tongue midline, no facial droop, able to lift right UE,While on left UE -- motor is 0/5. Both LE approx 4-5/5. , no sensory deficits.     Assesment/Plan:  Stroke code was called. Order set initiated. We did talk to Neurology on call - Dr Henley     Plain CT head - Right occipital lobe PCA territory prominent cortical and subcortical acute or subacute infarct.     We discussed the case with Dr Henley. He did review the imagings of the head including CTA --- Patient was felt not candidate for systemic lytics or endovascular therapies.     It was recommend to resume Plavix, and also need to determine appropriate timing for eliquis to resume while consider risks.       We did call Maddi/Abelardo -- Patient's family --- we informed them of the event, findings on Head CT, and recommendations from Neurology. No further concerns or questions.     Neurology will see patient in AM

## 2023-01-16 NOTE — CODE/RAPID RESPONSE
STROKE CODE NOTE    The house officer was paged at 10:30 PM for a stroke code called for Son Huizar.    Subjective/Interval Events:  Son Huizar is a 87 year old male with a past medical history that includes A. fib, CVA, thalamic infarct, dementia, lymphoma, hyperlipidemia, and hypertension.  Patient was admitted for liver/pancreatic masses and anemia, secondary to GI bleed.  Nursing staff stated while patient transfer to the commode, noticed left arm weakness which was not exist from before.  When assessing the patient,  have severe weakness on the LUE, could not raise his left arm, but has intact sensation.  Due to low slums score and dementia, unsure how reliable the patient's answer for orientation.    Objective:  /63 (BP Location: Right arm, Cuff Size: Adult Regular)   Pulse 93   Temp 99  F (37.2  C) (Oral)   Resp 24   Wt 72.4 kg (159 lb 9.8 oz)   SpO2 96%   BMI 23.57 kg/m    Blood glucose:    NIH Stroke Scale  Interval: 10:30 PM  Time: 11:53 PM  Person Administering Scale: MD Forest    Administer stroke scale items in the order listed. Record performance in each category after each subscale exam. Do not go back and change scores. Follow directions provided for each exam technique. Scores should reflect what the patient does, not what the clinician thinks the patient can do. The clinician should record answers while administering the exam and work quickly. Except where indicated, the patient should not be coached (i.e., repeated requests to patient to make a special effort).      1a  Level of consciousness: 1   1b. LOC questions:  1   1c. LOC commands: 0   2.  Best Gaze: 1   3.  Visual: 1   4. Facial Palsy: 0   5a.  Motor left arm: 2   5b.  Motor right arm: 0   6a. motor left le   6b  Motor right le   7. Limb Ataxia: 0   8.  Sensory: 0   9. Best Language:  0   10. Dysarthria: 0   11. Extinction and Inattention: 1   12. Distal motor function: 0    Total:   7  "      Assessment/Plan:  Son Huizar is a 87 year old male admitted for liver/pancreas mass and anemia secondary to GI bleed who developed left arm weakness on 1/15/2023, prompting a stroke code to be called. CT revealed no hemorrhage, Right occipital lobe PCA territory prominent cortical and subcortical acute or subacute infarct.,consistent with\" intracranial ischemia. CTA head/neck revealed multiple vessels stenosis, stenosis with mural wall thickening could reflect noncalcified plaque or nonocclusive dissection.     After discussion with  neurologist, the decision was made  not to give give tPA due to established infarct on imaging and GI bleed within the past 14 days.  -Recommendation per neurology   Consider repeat head CT in 12-14 hours   Consider restarting aspirin or Plavix for secondary stroke   Consult telestroke, if needed  -N.p.o.  -Telemetry  -Swallow study  -Neuro check and vitals every 15 minute  -Continuous pulse ox    Contacted patient's daughter, Maddi and her  before and after the imaging were completed and discussed the result and the neurology recommendation.  Family did agree to the neurology recommendation    Maureen Garg MD, PGY-3  [facility]  01/15/23          "

## 2023-01-16 NOTE — PLAN OF CARE
Problem: Confusion Chronic  Goal: Optimal Cognitive Function  Intervention: Minimize Injury Risk and Provide Safety  Recent Flowsheet Documentation  Taken 1/16/2023 0453 by Veronica Fletcher, RN  Enhanced Safety Measures: bed alarm set  Taken 1/16/2023 0220 by Veronica Fletcher, RN  Enhanced Safety Measures: bed alarm set     Problem: Stroke, Ischemic (Includes Transient Ischemic Attack)  Goal: Optimal Cognitive Function  Outcome: Progressing   Pt stable in afternoon until nursing attempted to pivot him to commode as he stated he wanted to use the bathroom. He was unable to perform the pivot, requiring assist of 3 staff members which was a change in status, as well as his LUE was flaccid. Status was discussed and decision was made to call for a rapid response/code stroke. Code stroke was deescalated, see neurology notes. Family elected not to pursue aggressive treatment as pt is set to discharge on hospice and is DNR DNI. Telemetry readings vary between afib-a flutter with PVCs and a BBB, otherwise VSS. Oriented to self as was baseline prior to code stroke. Having some issues with retention, but pt has been incontinent a few times during NOC shift. As of 0400, there is slight contraction in the LUE.

## 2023-01-16 NOTE — PROGRESS NOTES
Around 2215, pt was visited by nursing and found to be extremely weak on his LUE during transfer to [facility], an acute change from his status at shift change around 1900. Condition was evaluated by nursing staff and decision was made to call rapid response for change in status. Code stroke called, head CT performed. No observable changes to mentation. Stroke code deescalated per provider. Continue telemetry and continuous pulse ox as well as Q4 neuro checks per Dr Garg

## 2023-01-16 NOTE — PROGRESS NOTES
SPIRITUAL HEALTH SERVICES (SHS)  SPIRITUAL ASSESSMENT Progress Note  [facility]. [address]    REFERRAL SOURCE: LOS     Mr Huizar was sitting up in bed, reaching for branches on his tray table that were not there. He engaged in a conversation with his daughter regarding his financial situation but declined the Spiritual Care visit and communion.      PLAN: Maddi, Mr Huizar daughter and her  Abelardo are open to Spiritual Care visits as able.      Lin Perez, Ph.D., [facility]      [facility]S available 24/7 for emergency requests/referrals, either by having the on-call  paged or by entering an ASAP/STAT consult in Epic (this will also page the on-call ).

## 2023-01-16 NOTE — PROGRESS NOTES
[facility]    Progress Note - [facility] Residency Service   Date of Admission:  1/9/2023    Assessment & Plan   Son Huizar is a 87 year old male admitted on 1/9/2023. He has a past medical history of atrial fibrillation, essential hypertension, hyperlipidemia, lymphoma, CVA, dementia, and thalamic infarction. He is admitted for increased weakness, incontinence, and confusion. He is newly diagnosed with anemia. CT scan patient received on admission found new malignancy in his liver and pancreas.  After discussion with family and oncology, family elected to place patient in hospice care. Patient had stroke code called on 1/15 due to LUE weakness. Stroke code was de-escalated without intervention.     Diffuse large B cell lymphoma  Liver mass, newly diagnosed  Pancreatic mass, newly diagnosed  Follows with [facility], Dr. Ross. Discussed care with NARCISO Reyna with [facility]. Innumerable peripheral enhancing masses throughout the liver and pancreas measuring up to 3 cm.   - Due to poor slums score and patient being disoriented, he is unfit to make his own medical decisions at this time.    - After discussion with family they would like patient to be DNR/I.  - Hospice consult placed   - Patient has been accepted at hospice.  Is scheduled to be admitted sometime next week.    LUE Weakness likely secondary to acute ischemic CVA  CTA head and neck demonstrated occlusion of R P3 branch artery  -Discussed risks and benefits of resuming anticoagulation with daughter Maddi and son-in-law Abelardo who are at patient's bedside.  They would like to restart aspirin, Plavix and Eliquis.       Diet: Room Service  Combination Diet Regular Diet    DVT Prophylaxis: None  Ghotra Catheter: Not present  Fluids: PO  Lines: None     Cardiac Monitoring: None  Code Status: No CPR- Do NOT Intubate      Disposition Plan   Expected Discharge Date: 01/17/2023      Destination: Hospice         The patient's  care was discussed with the Attending Physician, MD Subhash Gao, DO PGY1  Hospitalist Service  [facility]  Securely message with bepretty (more info)  Text page via [employer]? Paging/Directory   ______________________________________________________________________    Interval History   Patient had stroke code called on him last night due to new onset left upper extremity weakness.  After consultation with neurology, we decided to hold off on any tPA due to patient's hospice status and diffusely stenotic arteries noted in the brain.  Patient very lethargic and fatigued laying in bed during my encounter today, but was arousable and would converse.  Oriented only to self.  Unable to move left arm.    Physical Exam    Vital Signs: Temp: 97.6  F (36.4  C) Temp src: Oral BP: 116/69 Pulse: 90   Resp: 20 SpO2: 98 % O2 Device: None (Room air)    Weight: 161 lbs 9.55 oz  General appearance: Laying comfortably in bed, in no distress, lethargic, fatigued, pale appearing  Head: normocephalic, without obvious abnormalities  Eyes: conjunctivae/corneas clear.  Corrective glasses in place  Ears: hearing grossly intact  Nose: nares normal, no drainage  Throat: lips, mucosa, and tongue srini  Lung: clear to auscultation bilaterally, no wheezing, coughing, or use of accessory muscles  Heart: regular rate and rhythm, S1, S2 normal, no murmur, click, rub, or gallop  Abdomen: soft, non-tender, bowel sounds present, no masses   Extremities: warm, dry, atraumatic, no cyanosis.  No motion of LUE  Skin: pale appearing, texture, and turgor. No rashes or lesions  Neurologic:  Moves all extremities spontaneously.   Psychologic: orientated to self when awake       Data       19.1 (H)  \   8.1 (L)   / 259     138 103 18 /  227 (H)   3.6 23 0.92 \       INR:  1.39 (H) PTT:  37   D-dimer:  N/A Fibrinogen:  N/A       Imaging results reviewed over the past 24 hrs:   Recent Results (from the past 24 hour(s))    CTA Head Neck with Contrast    Narrative    EXAM: CTA HEAD NECK W CONTRAST  LOCATION: [facility]  DATE/TIME: 1/15/2023 10:53 PM    INDICATION: Left-sided weakness. Code stroke.  COMPARISON: CT head 01/09/2023  CONTRAST: 75ml Isovue 370  TECHNIQUE: Head and neck CT angiogram with IV contrast. Noncontrast head CT followed by axial helical CT images of the head and neck vessels obtained during the arterial phase of intravenous contrast administration. Axial 2D reconstructed images and   multiplanar 3D MIP reconstructed images of the head and neck vessels were performed by the technologist. Dose reduction techniques were used. All stenosis measurements made according to NASCET criteria unless otherwise specified.    FINDINGS:   NONCONTRAST HEAD CT:   INTRACRANIAL CONTENTS: No acute intracranial hemorrhage, extraaxial collection, or midline shift.  Right occipital lobe PCA territory prominent cortical and subcortical hypoattenuation concerning for acute or subacute infarct. No additional CT evidence   for an acute infarct. Aspect score 10. Severe presumed chronic small vessel ischemic changes. Left thalamus small chronic infarct. Right posterior limb internal capsule small chronic infarct. Left superior lateral cerebellum small chronic infarct.   Moderate to severe ventriculomegaly with ventricles enlarged disproportionate to the sulcal atrophy. Findings may reflect normal pressure hydrocephalus, central brain atrophy, or extraventricular noncommunicating hydrocephalus. Please correlate   clinically.  Global left greater than the right temporal lobe atrophy concerning for underlying neurodegenerative process including but not limited to Alzheimer's disease. Please correlate clinically.      HEAD CTA:  Right M1 segment mild stenosis.     Left proximal M2 segment mild stenosis.    Right A2 segment tandem severe stenoses.    Mid basilar artery mild stenosis.    Left proximal superior cerebral  artery severe stenosis.    Left proximal V4 segment moderate stenosis.    Right P2 occlusion with reconstitution. Right distal P2 segment severe stenosis. Right P3 branch artery occlusion likely present.    Left proximal P2 segment severe high-grade stenosis with only a thin wisp of enhancement.    No additional significant stenosis/occlusion.      No brain aneurysm. No AVM/AVF.    Standard [place] anatomy.     Dominant left and smaller right vertebral artery contribute to a normal basilar artery.     DURAL VENOUS SINUSES: Not well evaluated on a technical basis.      NECK CTA:  RIGHT CAROTID: No significant stenosis/occlusion. No dissection.    LEFT CAROTID: No significant stenosis/occlusion. No dissection.    VERTEBRAL ARTERIES:  Right vertebral artery origin severe stenosis.     Right proximal V2 segment moderate stenosis secondary to extrinsic compression.    Right horizontal V3 segment mild stenosis with mural wall thickening could reflect noncalcified plaque or nonocclusive dissection.    Otherwise, no significant stenosis, occlusion, dissection.      Dominant left and smaller right vertebral arteries.    AORTIC ARCH: Classic aortic arch anatomy with no significant stenosis at the origin of the great vessels.    ARTERIAL PLAQUE: Calcified/noncalcified plaque scattered throughout the arterial system.    NONVASCULAR STRUCTURES:   Left mastoid air cells moderate patchy opacification. Mild to moderate right pleural effusion. Small left pleural effusion. Degenerative changes noted within the spine.        Impression     IMPRESSION:   HEAD CT:  1.  No acute intracranial hemorrhage.   2.  Right occipital lobe PCA territory prominent cortical and subcortical acute or subacute infarct.    3.  No additional CT evidence for an acute infarct. Aspect score 10.  4.  Chronic intracranial changes described above.    Dr Steele was notified by Dr Samuel Cruz at  11:18 PM 01/15/2023.       HEAD CTA:  1.  Right P2  occlusion with reconstitution. Right distal P2 segment severe stenosis. Right P3 branch artery occlusion likely present.  2.  Left proximal P2 segment severe high-grade stenosis with only a thin wisp of enhancement.  3.  Multiple additional intracranial arteries demonstrate stenoses ranging from mild to severe. Please above for discussion.      NECK CTA:  1.  Right horizontal V3 segment mild stenosis with mural wall thickening could reflect noncalcified plaque or nonocclusive dissection.  2.  Right proximal V2 segment moderate stenosis secondary to extrinsic compression.  3.  Right vertebral artery origin severe stenosis.   4.  Otherwise, no significant stenosis, occlusion, dissection.    Dr Maureen Garg was notified by Dr Samuel Cruz at  11:35 PM 01/15/2023.

## 2023-01-16 NOTE — PROGRESS NOTES
Speech-Language Pathology: Clinical swallow evaluation     01/16/23 1045   Appointment Info   Signing Clinician's Name / Credentials (SLP) Luann Lechuga MS CCC-SLP   General Information   Onset of Illness/Injury or Date of Surgery 01/09/23   Referring Physician Maureen Garg MD   Patient/Family Therapy Goal Statement (SLP) None stated   Pertinent History of Current Problem The pt is an 87 year old male admitted on 1/9/2023. He has a past medical history of atrial fibrillation, essential hypertension, hyperlipidemia, lymphoma, CVA, dementia, and thalamic infarction. He is admitted for increased weakness, incontinence, and confusion. He is newly diagnosed with anemia. CT scan patient received on admission found new malignancy in his liver and pancreas.  After discussion with family and oncology, family elected to place patient in hospice care. Patient had stroke code called on 1/15 due to LUE weakness. Stroke code was de-escalated without intervention. Clinical swallow evaluation ordered per MD d/t code stroke.   General Observations The pt is very fatigued, however he did rouse for swallow evaluation with verbal cues.   Pain Assessment   Patient Currently in Pain No   Type of Evaluation   Type of Evaluation Swallow Evaluation   Oral Motor   Oral Musculature anomalies present   Structural Abnormalities none present   Mucosal Quality adequate   Dentition (Oral Motor)   Dentition (Oral Motor) natural dentition   Facial Symmetry (Oral Motor)   Facial Symmetry (Oral Motor) WNL   Lip Function (Oral Motor)   Lip Range of Motion (Oral Motor) protrusion impairment   Comment, Lip Function (Oral Motor) Generalized weakness   Protrusion, Lip Range of Motion bilateral;moderate impairment   Tongue Function (Oral Motor)   Tongue ROM (Oral Motor) lateralization is impaired;protrusion is impaired   Protrusion, Tongue ROM Impairment (Oral Motor) bilateral   Lateralization, Tongue ROM Impairment (Oral Motor) bilateral;moderate  impairment   Jaw Function (Oral Motor)   Jaw Function (Oral Motor) unable/difficult to assess   Cough/Swallow/Gag Reflex (Oral Motor)   Volitional Throat Clear/Cough (Oral Motor) WNL   Volitional Swallow (Oral Motor) unable/difficult to assess   Vocal Quality/Secretion Management (Oral Motor)   Vocal Quality (Oral Motor) WFL   Secretion Management (Oral Motor) WNL   General Swallowing Observations   Past History of Dysphagia The pt underwent SLP evaluation in September 2018 s/p CVA with recommendation for thin liquids and regular solids. No further hx of dysphagia per chart review. His RN today reports good tolerance of medications and water.   Respiratory Support (General Swallowing Observations) none   Current Diet/Method of Nutritional Intake (General Swallowing Observations, NIS) NPO   Swallowing Evaluation Clinical swallow evaluation   Clinical Swallow Evaluation   Feeding Assistance frequent cues/help required   Clinical Swallow Evaluation Textures Trialed thin liquids;pureed;solid foods   Clinical Swallow Eval: Thin Liquid Texture Trial   Mode of Presentation, Thin Liquids cup;straw;fed by clinician   Volume of Liquid or Food Presented 3 oz   Oral Phase of Swallow WFL   Pharyngeal Phase of Swallow intact   Diagnostic Statement Throat clear on initial sip, however when pt started to wake up more, no s/s of aspiration.   Clinical Swallow Evaluation: Puree Solid Texture Trial   Mode of Presentation, Puree spoon;fed by clinician   Volume of Puree Presented 5 bites   Oral Phase, Puree WFL   Pharyngeal Phase, Puree intact   Diagnostic Statement No s/s of aspiration or difficulty   Clinical Swallow Evaluation: Solid Food Texture Trial   Mode of Presentation fed by clinician   Volume Presented 1 cracker   Oral Phase WFL   Pharyngeal Phase intact   Diagnostic Statement No s/s of aspiration or difficulty with swallowing.   Esophageal Phase of Swallow   Patient reports or presents with symptoms of esophageal dysphagia  No   Swallowing Recommendations   Diet Consistency Recommendations thin liquids (level 0)   Supervision Level for Intake close supervision needed   Mode of Delivery Recommendations bolus size, small;slow rate of intake   Monitoring/Assistance Required (Eating/Swallowing) monitor for cough or change in vocal quality with intake   Recommended Feeding/Eating Techniques (Swallow Eval) maintain upright sitting position for eating;provide assist with feeding   Medication Administration Recommendations, Swallowing (SLP) Per pt preference   Instrumental Assessment Recommendations instrumental evaluation not recommended at this time   General Therapy Interventions   Planned Therapy Interventions Dysphagia Treatment   Dysphagia treatment Instruction of safe swallow strategies   Clinical Impression   Criteria for Skilled Therapeutic Interventions Met (SLP Eval) Yes, treatment indicated   SLP Diagnosis Suspect functional oropharyngeal swallow function   Risks & Benefits of therapy have been explained evaluation/treatment results reviewed;care plan/treatment goals reviewed;risks/benefits reviewed;current/potential barriers reviewed;participants voiced agreement with care plan;participants included;patient   Clinical Impression Comments Clinical swallow evaluation completed per MD order. The pt presents with suspect functional oropharyngeal swallow function, however evaluation was somewhat limtied given degree of fatigue. Oral mech significant for generalized weakness, reduced lingual and labial ROM, and soft vocal quality. Reduced bolus control and throat clear on initial sip of thin liquids, however suspect pt was fatigued and not ancipating swallow. When cued to open eyes, he had improved tolerance with no s/s of aspiration with thin liquids, puree solids, and regular solids. Mastication was timely and he cleared minimal oral residuals with use of liquid wash. He denied difficulty or globus sensation. Vocal quality is clear.  Limited trials offered as pt was fatigued and wanting to nap. Recommend regular solids with thin liquids. Upright with PO, small bites, slow pace, and assist with feeding. Suspect functional oropharyngeal swallow, however will follow for diet tolerance and strategy training with family given degree of fatigue today.   SLP Total Evaluation Time   Eval: oral/pharyngeal swallow function, clinical swallow Minutes (97013) 20   SLP Discharge Planning   SLP Plan Diet tolerance, train strategies with family/caregivers   SLP Discharge Recommendation home   SLP Rationale for DC Rec Anticipate pt will meet goals prior to D/C   SLP Brief overview of current status  Recommend regular solids with thin liquids. Upright with PO, small bites, slow pace, and assist with feeding. Suspect functional oropharyngeal swallow, however will follow for diet tolerance and strategy training with family given degree of fatigue today.   Total Session Time   Total Session Time (sum of timed and untimed services) 20

## 2023-01-16 NOTE — PROGRESS NOTES
01/16/23 1310   Appointment Info   Signing Clinician's Name / Credentials (OT) Emelina Whiteside OTR/L   Rehab Comments (OT) OT Re-Evaulation due to change in status   General Information   Additional Occupational Profile Info/Pertinent History of Current Problem Patient had stroke code called on 1/15 due to LUE weakness. Stroke code was de-escalated without intervention. Family wishes to continue OT as pt is able, considering discharging to hospice.   Cognitive Status Examination   Affect/Mental Status (Cognitive) low arousal/lethargic   Cognitive Status Comments Answering occasionally with 1-word utterances   Visual Perception   Visual Impairment/Limitations corrective lenses full-time   Impact of Vision Impairment on Function (Vision) Pt not able to visually track to left visual field, saccades noted to R visual field and back to midline   Sensory   Sensory Comments Unclear level of sensation in L UE   Pain Assessment   Patient Currently in Pain No   Posture   Posture Comments Supported in bed, head turning to R side   General Upper Extremity Assessment (Range of Motion)   Comment: Upper Extremity ROM ROM remains limited to 90-given PROM assessment   Strength Comprehensive (MMT)   General Manual Muscle Testing (MMT) Assessment upper extremity strength deficits identified   Upper Extremity (Manual Muscle Testing)   Comment, MMT: Upper Extremity L UE primarily flacid, with flicker of left hand grasp and slight adjustment movement noted in L shoulder.   Coordination   Upper Extremity Coordination Left UE impaired   Bed Mobility   Comment (Bed Mobility) Max A x2 for boosting in bed   Transfers   Transfer Comments Not assessed   Clinical Impression   Criteria for Skilled Therapeutic Interventions Met (OT) Yes, treatment indicated   OT Diagnosis decreased independence w/ADLs   OT Problem List-Impairments impacting ADL activity tolerance impaired;balance;cognition;motor control;mobility;sensation;hearing   Assessment of  Occupational Performance 5 or more Performance Deficits   Identified Performance Deficits cognition, dressing, bed mobility, sitting balance, toiletng   Planned Therapy Interventions (OT) ADL retraining;balance training;bed mobility training;neuromuscular re-education;progressive activity/exercise;transfer training   Clinical Decision Making Complexity (OT) moderate complexity   Risk & Benefits of therapy have been explained care plan/treatment goals reviewed   Clinical Impression Comments Pt not able to follow plan of care, daughter and son inlaw demonstrated acknowledge   OT Total Evaluation Time   OT Eval, Moderate Complexity Minutes (73791) 15   OT Goals   Therapy Frequency (OT) 3 times/wk   OT Predicted Duration/Target Date for Goal Attainment 01/23/23   OT Goals OT Goal 1;Bed Mobility   OT: Lower Body Dressing Completed  (decline in status)   OT: Bed Mobility Maximum assist;supine to/from sitting   OT: Transfer Completed   OT: Toilet Transfer/Toileting Maximum assist;toilet transfer;cleaning and garment management   OT: Cognitive Patient/caregiver will verbalize understanding of cognitive assessment results/recommendations as needed for safe discharge planning   OT: Goal 1 Pt will demonstrate sitting balance w/max A of 1/   Interventions   Interventions Quick Adds Neuromuscular Re-education   Neuromuscular Re-Education   Neuromuscular Re-Education Minutes (48761) 18   Symptoms Noted During/After Treatment none   Treatment Detail/Skilled Intervention Pt laying on R side upon arrival. Given vc pt able to mildly adjust head to midline, once placing pt in midline-head and back pt noted to continue to drift towards the left. Working increasing awarenss of LUE, hand over hand assistance to tactile touch left UE working on accuraly labeling what he is feelling (reporting touching hand when forearm-possibly referencing R hand), with max vc to visually look at L UE. Laced fingers together of both hands w/mod assist  commplete shoulder ROM and cues to grasp onto left hand. Ed pt/family of positioning and light retrograde massage of L hand/wrist forearm to support edema management. Ed pt that if they want to visit with ease to communicate on R side of pt, to increase awarness and work on skills speak on left side and encourage head turn.   OT Discharge Planning   OT Plan Assist of 2-Attempt sitting balance at EOB, advance ADLs using B hand use, neuro re-ed sensory awareness of L UE/ROM, visually tending to left side   OT Discharge Recommendation (DC Rec) Long term care facility   OT Rationale for DC Rec Pt needs assist 1-2with all ADLs and fx mobility.   OT Brief overview of current status Drowsy, inconsistently following 1 step directions-limited function of L UE (flicker of grap), Max 2 for bed mobility   Total Session Time   Timed Code Treatment Minutes 18   Total Session Time (sum of timed and untimed services) 33

## 2023-01-16 NOTE — CONSULTS
[facility]    Stroke Telephone Note    I was called by Dr. Dominguez on 01/15/23 regarding patient Son Huizar. The patient is a 87 year old male who presents with blood loss anemia secondary to GI bleed (previously on Eliquis/Plavix--now held) and was transfused 1 unit pRBCs for a Hgb < 7.0.  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     A stroke code was activated for left arm weakness and presumed neglect (patient as unaware of deficits).  He was reportedly normal at shift change 1900.    CTA shows multifocal stenoses, with a severe stenosis vs thrombus at the right P1/P2 junction with developing infarction within the R. Occipital lobe--this is out of proportion to what would be expected in a patient 4 hours from last known well and suggests that he would respond poorly to lytics, in addition to the contraindication of recent GI bleed.    Stroke Code Data (for stroke code without tele)  Stroke code activated 01/15/23   2236   Stroke provider first response  01/15/23   2237            Last known normal 01/15/23   1900        Time of discovery   (or onset of symptoms) 01/15/23   2220   Head CT read by Stroke Neuro Dr/Provider 01/15/23   2305   Was stroke code de-escalated? Yes 01/15/23 2312          Imaging Findings   As above    Intravenous Thrombolysis  Not given due to:   - established infarct on imaging  - GI bleed within the past 14 days    Endovascular Treatment  Not initiated due to absence of proximal vessel occlusion    Impression  1. Acute ischemic stroke unclear etiology    Recommendations   1. Patient transitioning to hospice, please consult Telestroke if we can be of assistance.  2. Consider restarting aspirin or plavix for secondary stroke prevention--it is unlikely to impact progression of this infarct  3. Consider repeat Head CT in 12-24 hours to see full extent of stroke (suspect entire PCA at risk)  --this may help with  "prognosis, but is unlikely to   4. Ongoing goals of care discussions  5. Spoke with hospitalists to update them on stroke code--no acute stroke intervention given goals of care, location of stenosis/occlusion, recent GI bleed, and established infarct.    My recommendations are based on the information provided over the phone by Son Huizar's in-person providers. They are not intended to replace the clinical judgment of his in-person providers. I was not requested to personally see or examine the patient at this time.    Damon Henley MD, MS  Vascular Neurology    To page me or covering stroke neurology team member, click here: [facility]  Choose \"On Call\" tab at top, then select \"NEUROLOGY/ALL SITES\" from middle drop-down box, press Enter, then look for \"stroke\" or \"telestroke\" for your site.      I personally spent a total of 50 minutes on January 15, 2023 consulting with his  medical providers and coordinating care.  This includes personally reviewing the patient's medical record including diagnostic testing, neuroimaging, and laboratory studies.       "

## 2023-01-16 NOTE — PROVIDER NOTIFICATION
Paged BFM to request straight cath orders- bladder scanned for 377. Unable to void.         Update: MD will place orders.

## 2023-01-16 NOTE — PROGRESS NOTES
Care Management Follow Up    Length of Stay (days): 7    Expected Discharge Date: 01/17/2023     Concerns to be Addressed: discharge planning       Patient plan of care discussed at interdisciplinary rounds: Yes    Anticipated Discharge Disposition:  long term care with hospice- facility TBD    Anticipated Discharge Services: hospice    Anticipated Discharge DME:  TBD    Patient/family educated on Medicare website which has current facility and service quality ratings: yes    Education Provided on the Discharge Plan:  AVS per bedside RN    Patient/Family in Agreement with the Plan: yes    Referrals Placed by CM/SW:  Post acute care    Private pay costs discussed: transportation costs    Additional Information:  Chart reviewed. CM met with Maddi (daughter) and Abelardo (son-in-law). Both are in agreement with long term care facilities with hospice. Hospice agency TBD. Ok with referrals being sent to [facility] and [facility] as well as [facility] in [place]. Additional choices will be given to CM if needed. Transportation will need to be arranged by CM. CM will follow.       Hortencia Caal RN

## 2023-01-16 NOTE — PROGRESS NOTES
Referrals sent per family request  [facility] ([facility])  [facility] (SNF)  [facility] ([facility])

## 2023-01-16 NOTE — PLAN OF CARE
Pt has been resting and was able to participate with OT today. LUE remains to have slight flickers of movement but remains severely impaired. LLE remains weak compared to RLE. Pt has been compliant with medications. Pt's family is at bedside. He remains on bedrest. Pt able to swallow pills well.         Goal Outcome Evaluation:         Problem: Plan of Care - These are the overarching goals to be used throughout the patient stay.    Goal: Absence of Hospital-Acquired Illness or Injury  Outcome: Progressing  Intervention: Identify and Manage Fall Risk  Recent Flowsheet Documentation  Taken 1/16/2023 0871 by Pat King RN  Safety Promotion/Fall Prevention: activity supervised  Goal: Readiness for Transition of Care  Outcome: Progressing     Problem: Risk for Delirium  Goal: Improved Attention and Thought Clarity  Outcome: Progressing     Problem: Dysrhythmia  Goal: Normalized Cardiac Rhythm  Outcome: Progressing     Problem: Stroke, Ischemic (Includes Transient Ischemic Attack)  Goal: Improved Sensorimotor Function  Outcome: Progressing  Goal: Optimal Eating and Swallowing without Aspiration  Outcome: Progressing

## 2023-01-16 NOTE — PROGRESS NOTES
"CLINICAL NUTRITION SERVICES - ASSESSMENT NOTE     Nutrition Prescription    RECOMMENDATIONS FOR MDs/PROVIDERS TO ORDER:  None    Malnutrition Status:    % Weight Loss:  Weight loss does not meet criteria for malnutrition   % Intake:  Decreased intake here, unclear how long   Subcutaneous Fat Loss:  Orbital region moderate depletion and Upper arm region moderater depletion  Muscle Loss:  Temporal region moderate depletion and Clavicle bone region modersate depletion  Fluid Retention:  None noted    Malnutrition Diagnosis: Moderate malnutrition  In Context of:  Chronic illness or disease    Recommendations already ordered by Registered Dietitian (RD):  Ensure daily    Future/Additional Recommendations:  none     REASON FOR ASSESSMENT  Son Huizar is a/an 87 year old male assessed by the dietitian for LOS     Pt admitted with liver/pancreas mass with metastasis, anemia, GI bleed, acute ischemic stroke, transitioning to hospice care    NUTRITION HISTORY  NKFA  Attempted to see pt but he didn't answer many of my questions       CURRENT NUTRITION ORDERS  Diet: Regular   Intake prior was documented as 25%-50% at meals       LABS  Labs reviewed, Gluc-235, 227    MEDICATIONS  Medications reviewed, Mag-ox    ANTHROPOMETRICS  Height: 5'9\"  Most Recent Weight: 73.3 kg (161 lb 9.6 oz)    IBW: 73 kg  BMI: Normal BMI  Weight History:   Wt Readings from Last 10 Encounters:   01/16/23 73.3 kg (161 lb 9.6 oz)   08/13/21 73.6 kg (162 lb 4.8 oz)   10/03/19 72.3 kg (159 lb 6.4 oz)   10/10/18 77.1 kg (170 lb)   09/26/18 77.1 kg (170 lb)   09/25/18 77.1 kg (170 lb)   05/29/18 78 kg (172 lb)   04/23/18 79.4 kg (175 lb)   04/13/18 77.1 kg (170 lb)   10/17/17 78 kg (171 lb 14.4 oz)       Dosing Weight: 73 kg    ASSESSED NUTRITION NEEDS  Estimated Energy Needs: 9435-8017 kcals/day (25 - 30 kcals/kg)  Justification: Maintenance  Estimated Protein Needs: 73-88 grams protein/day (1 - 1.2 grams of pro/kg)  Justification: Maintenance  Estimated " Fluid Needs: 4727-7388 mL/day (25 - 30 mL/kg)   Justification: Maintenance    PHYSICAL FINDINGS  See malnutrition section below.  Per flowsheet:   GI: constipation, last BM noted on 1/9  Skin: no breakdown     MALNUTRITION:  % Weight Loss:  Weight loss does not meet criteria for malnutrition   % Intake:  Decreased intake here, unclear how long   Subcutaneous Fat Loss:  Orbital region moderate depletion and Upper arm region moderater depletion  Muscle Loss:  Temporal region moderate depletion and Clavicle bone region modersate depletion  Fluid Retention:  None noted    Malnutrition Diagnosis: Moderate malnutrition  In Context of:  Chronic illness or disease    NUTRITION DIAGNOSIS  Malnutrition related to cancer as evidenced by ongoing mild wt loss, reduced po, unclear on duration, and moderate fat and muscle loss       INTERVENTIONS  Implementation  Pt transitioning to hospice care  Diet as tolerated.   Will offer some supplements, Ensure daily     Goals  Patient to consume % of nutritionally adequate meals three times per day, or the equivalent with supplements/snacks.  Meet wishes      Monitoring/Evaluation  Progress toward goals will be monitored and evaluated per protocol.

## 2023-01-17 PROBLEM — Z75.8 DISCHARGE PLANNING ISSUES: Status: ACTIVE | Noted: 2023-01-01

## 2023-01-17 NOTE — PROGRESS NOTES
Care Management Follow Up    Length of Stay (days): 8    Expected Discharge Date: 01/18/2023     Concerns to be Addressed: discharge planning  (long term care with hospice)     Patient plan of care discussed at interdisciplinary rounds: Yes    Anticipated Discharge Disposition:  long term care with hospice- facility TBD  Disposition Comments: long term care with hospice- facility TBD    Anticipated Discharge Services:  TBD    Anticipated Discharge DME:  TBD    Patient/family educated on Medicare website which has current facility and service quality ratings: yes    Education Provided on the Discharge Plan:  AVS per bedside RN    Patient/Family in Agreement with the Plan: yes    Referrals Placed by CM/SW:  Post acute care        Additional Information:  Chart reviewed. CM following for discharge needs. Referrals for long term care pending.   CM updated Maddi (daughter). Daughter in agreement with referrals being sent. Transportation will need to be arranged by CM. CM will follow.     Referrals Pending  [facility] (SNF)  [facility] (SNF)  [facility]  [facility] ([facility])      Hortencia Caal RN

## 2023-01-17 NOTE — PROGRESS NOTES
Speech Language Therapy Discharge Summary    Reason for therapy discharge:    Patient/family request discontinuation of services.    Progress towards therapy goal(s). See goals on Care Plan in Epic electronic health record for goal details.  Goals partially met.  Barriers to achieving goals:   Change in goals of care.    Therapy recommendation(s):    No further therapy is recommended.  Patient has signed on with Hospice.  Awaiting placement at long-term hospice care facility.

## 2023-01-17 NOTE — PLAN OF CARE
Problem: Risk for Delirium  Goal: Optimal Coping  Outcome: Not Progressing   Goal Outcome Evaluation:         Pt is alert to self only. Denies pain. PIV SL x2. Can be agitated with cares at times. Incontinent of B/B. L sided weakness. Takes pills whole with water. Bedrest. T/Rq2h. Discharge to LTC vs TCU with hospice is pending.

## 2023-01-17 NOTE — PLAN OF CARE
Problem: Fall Injury Risk  Goal: Absence of Fall and Fall-Related Injury  Intervention: Identify and Manage Contributors  Recent Flowsheet Documentation  Taken 1/17/2023 0905 by Anabelle Novoa, RN  Medication Review/Management: medications reviewed  Intervention: Promote Injury-Free Environment  Problem: Malnutrition  Goal: Improved Nutritional Intake  Outcome: Adequate for Care Transition   Goal Outcome Evaluation:  Pt pleasant and cooperative, he is slow to respond, slow to move.  He ate with feeding assistance, approximately 50% of his meal, able to drink well.

## 2023-01-17 NOTE — PLAN OF CARE
Problem: Plan of Care - These are the overarching goals to be used throughout the patient stay.    Goal: Readiness for Transition of Care  Outcome: Progressing     A&O to self only. VSS. Denies pain. Left sided weakness. Repositioned q2h. TCU vs. LTC, referrals pending.

## 2023-01-17 NOTE — PROGRESS NOTES
[facility]    Progress Note - [facility] Residency Service   Date of Admission:  1/9/2023    Assessment & Plan   Son Huizar is a 87 year old male admitted on 1/9/2023. He has a past medical history of atrial fibrillation, essential hypertension, hyperlipidemia, lymphoma, CVA, dementia, and thalamic infarction. He was admitted for increased weakness, incontinence, and confusion. He is newly diagnosed with anemia. CT scan patient received on admission found new malignancy in his liver and pancreas.  After discussion with family and oncology, family elected to place patient in hospice care. Patient had stroke code called on 1/15 due to LUE weakness. Stroke code was de-escalated without intervention. Patient awaiting placement at long-term hospice care.     Diffuse large B cell lymphoma  Liver mass, newly diagnosed  Pancreatic mass, newly diagnosed  - After discussion with family they would like patient to be DNR/I and placed in hospice care  - Hospice consult placed    - Patient has been accepted at hospice.  Is scheduled to be admitted sometime next week.    Acute vs subacute ischemic CVA  CTA head and neck demonstrated occlusion of R P3 branch artery  -Plavix and Eliquis were discontinued due to bleeding concern. Patient only on aspirin 81 mg.       Diet: Room Service  Combination Diet Regular Diet  Snacks/Supplements Adult: Ensure Enlive; With Meals    DVT Prophylaxis: None  Ghotra Catheter: Not present  Fluids: PO  Lines: None     Cardiac Monitoring: None  Code Status: No CPR- Do NOT Intubate      Disposition Plan   Expected Discharge Date: 01/17/2023      Destination: Hospice         The patient's care was discussed with the Attending Physician, MD Subhash Gao DO PGY1  Hospitalist Service  [facility]  Securely message with [address] (more info)  Text page via [employer] Paging/Directory    ______________________________________________________________________    Interval History   Patient laying in bed and was very lethargic during my encounter today. Was conversational and following commands. Unable to move his left arm to command, but able to move all other extremities.     Physical Exam    Vital Signs: Temp: 97.2  F (36.2  C) Temp src: Axillary BP: 110/63 Pulse: 98   Resp: 16 SpO2: 94 % O2 Device: None (Room air)    Weight: 168 lbs 3.38 oz  General appearance: Laying comfortably in bed, in no distress, lethargic, fatigued, pale appearing, more wasting compared to yesterday.  Head: normocephalic, without obvious abnormalities  Eyes: conjunctivae/corneas clear  Ears: hearing grossly intact  Nose: nares normal, no drainage  Throat: lips, mucosa, and tongue normal  Lung: clear to auscultation bilaterally, no wheezing, coughing, or use of accessory muscles  Heart: regular rate and rhythm, S1, S2 normal, no murmur, click, rub, or gallop  Abdomen: soft, non-tender, bowel sounds present, no masses   Extremities: warm, dry, atraumatic, no cyanosis.  No motion of LUE. Able to move all other extremities.   Skin: pale appearing, texture, and turgor. No rashes or lesions  Neurologic:  Moves all extremities spontaneously.   Psychologic: orientated to self when awake       Data         Imaging results reviewed over the past 24 hrs:   No results found for this or any previous visit (from the past 24 hour(s)).

## 2023-01-17 NOTE — PLAN OF CARE
Problem: Risk for Delirium  Goal: Optimal Coping  Outcome: Progressing    Pt alert to self only. Very lethargic and sleeping most of shift. Has ongoing confusion, per baseline. Family by bedside. Continues to have L sided deficit, MD aware. Bedrest, repositioned q 2 hrs and per compliance. Reports LUE pain. Pain being controlled with PRN lidocaine ointment and repositioning. Will continue to monitor and keep as comfortable as able. Tolerating regular diet, poor appetite. Has not had bm since admission. PRN senna 2 tabs given, ineffective results. Abdomen soft and non tender. Bowel sounds audible. Will continue to monitor and intervene as needed.

## 2023-01-18 NOTE — PLAN OF CARE
Pt alert and orient to self. When nursing staff tried giving morning meds in applesauce pt wouldn't close mouth to swallow nursing staff had to assist pt to close mouth and rub on trachea to encourage swallowing. BFM notified and discontinued all oral meds except for aspirin which can be crushed and given to pt. Pt Q2 turns. Pt is a feeder when eating meals.   Mignon Garrido RN on 1/18/2023 at 4:48 PM   Problem: Plan of Care - These are the overarching goals to be used throughout the patient stay.    Goal: Plan of Care Review  Description: The Plan of Care Review/Shift note should be completed every shift.  The Outcome Evaluation is a brief statement about your assessment that the patient is improving, declining, or no change.  This information will be displayed automatically on your shift note.  Outcome: Progressing  Goal: Absence of Hospital-Acquired Illness or Injury  Intervention: Identify and Manage Fall Risk  Recent Flowsheet Documentation  Taken 1/18/2023 1530 by Mignon Garrido, RN  Safety Promotion/Fall Prevention:    increase visualization of patient    increased rounding and observation    fall prevention program maintained    bed alarm on  Taken 1/18/2023 0800 by Mignon Garrido, RN  Safety Promotion/Fall Prevention:    increase visualization of patient    increased rounding and observation    fall prevention program maintained    bed alarm on  Intervention: Prevent Skin Injury  Recent Flowsheet Documentation  Taken 1/18/2023 1530 by Mignon Garrido, RN  Body Position: weight shifting  Taken 1/18/2023 0800 by Mignon Garrido, RN  Body Position: weight shifting  Goal: Readiness for Transition of Care  Outcome: Progressing   Goal Outcome Evaluation:

## 2023-01-18 NOTE — PROGRESS NOTES
Care Management Follow Up    Length of Stay (days): 9    Expected Discharge Date: 01/19/2023     Concerns to be Addressed: discharge planning looking for long term care bed with hospice    Patient plan of care discussed at interdisciplinary rounds: Yes    Anticipated Discharge Disposition:  long term care with hospice- facility TBD  Disposition Comments: long term care with hospice- facility TBD    Anticipated Discharge Services:  TBD - will need transportation    Anticipated Discharge DME:  (TBD)    Patient/family educated on Medicare website which has current facility and service quality ratings: yes    Education Provided on the Discharge Plan:  AVS per bedside RN    Patient/Family in Agreement with the Plan: yes    Referrals Placed by CM/SW:  Long term care with hospice    Private pay costs discussed: transportation costs    Additional Information:  Chart reviewed. CM following for discharge needs. CM spoke with Maddi (daughter) and was given additional choices for facilities. Referrals pending. Transportation will need to be arranged by CM. CM will follow.     Referral pending  [facility] ([facility])    Additional referrals sent per Maddi (daughter) request  [facility] ([facility])  [facility] ([facility])  [facility] ([facility])    Hortencia Caal RN

## 2023-01-18 NOTE — PLAN OF CARE
Problem: Plan of Care - These are the overarching goals to be used throughout the patient stay.    Goal: Readiness for Transition of Care  Outcome: Progressing     A&O to self only. VSS. Denies pain. Left sided weakness. Repositioned q2h. LTC referrals pending, pt to go on Hospice.

## 2023-01-18 NOTE — PROGRESS NOTES
[facility]    Progress Note - [facility] Residency Service   Date of Admission:  1/9/2023    Assessment & Plan   Son Huizar is a 87 year old male admitted on 1/9/2023. He has a past medical history of atrial fibrillation, essential hypertension, hyperlipidemia, lymphoma, CVA, dementia, and thalamic infarction. He was admitted for increased weakness, incontinence, and confusion. He is newly diagnosed with anemia. CT scan patient received on admission found new malignancy in his liver and pancreas.  After discussion with family and oncology, family elected to place patient in hospice care. Patient had stroke code called on 1/15 due to LUE weakness. Stroke code was de-escalated without intervention. Patient awaiting placement at long-term hospice care.     Diffuse large B cell lymphoma  Liver mass, newly diagnosed  Pancreatic mass, newly diagnosed  - After discussion with family they would like patient to be DNR/I and placed in hospice care  - Hospice consult placed    - Patient has been accepted at hospice.  Is scheduled to be admitted sometime next week.  - Discontinued all PO meds except for aspirin that can be crushed. Patient having some difficulty swallowing       Diet: Room Service  Combination Diet Regular Diet  Snacks/Supplements Adult: Ensure Enlive; With Meals    DVT Prophylaxis: None  Ghotra Catheter: Not present  Fluids: PO  Lines: None     Cardiac Monitoring: None  Code Status: No CPR- Do NOT Intubate      Disposition Plan   Expected Discharge Date: 01/19/2023      Destination: Hospice         The patient's care was discussed with the Attending Physician, MD Subhash Gao, DO PGY1  Hospitalist Service  [facility]  Securely message with [employer] (more info)  Text page via [employer] Paging/Directory   ______________________________________________________________________    Interval History   Patient laying in bed and was very  lethargic during my encounter today. His brother, Jean, was at bedside. Unable to move his left arm to command, but able to move all other extremities. Patient has neglect of the left arm, as he believes that he can move it.    Physical Exam    Vital Signs: Temp: 98.9  F (37.2  C) Temp src: Oral BP: 111/60 Pulse: 96   Resp: 18 SpO2: 96 % O2 Device: None (Room air)    Weight: 162 lbs 4.14 oz  General appearance: Laying comfortably in bed, in no distress, lethargic, fatigued, pale appearing, wasted appearance.  Head: normocephalic, without obvious abnormalities  Eyes: conjunctivae/corneas clear  Ears: hearing grossly intact  Nose: nares normal, no drainage  Throat: lips, mucosa, and tongue normal  Lung: clear to auscultation bilaterally, no wheezing, coughing, or use of accessory muscles  Heart: regular rate and rhythm, S1, S2 normal, no murmur, click, rub, or gallop  Abdomen: soft, non-tender, bowel sounds present, no masses   Extremities: warm, dry, atraumatic, no cyanosis.  No motion of LUE. Able to move all other extremities.   Skin: pale appearing, texture, and turgor. No rashes or lesions  Neurologic:  Moves all extremities spontaneously.   Psychologic: orientated to self when awake       Data         Imaging results reviewed over the past 24 hrs:   No results found for this or any previous visit (from the past 24 hour(s)).

## 2023-01-19 PROBLEM — E44.0 MODERATE PROTEIN-CALORIE MALNUTRITION (H): Status: ACTIVE | Noted: 2023-01-01

## 2023-01-19 NOTE — PROGRESS NOTES
Care Management Follow Up    Length of Stay (days): 10    Expected Discharge Date: 01/20/2023     Concerns to be Addressed: discharge planning     Patient plan of care discussed at interdisciplinary rounds: Yes    Anticipated Discharge Disposition: Long Term Care  Disposition Comments: long term care with hospice- facility TBD  Anticipated Discharge Services: Other (see comment) (Hospice)  Anticipated Discharge DME:  (TBD)    Patient/family educated on Medicare website which has current facility and service quality ratings: yes  Education Provided on the Discharge Plan:    Patient/Family in Agreement with the Plan: yes    Referrals Placed by CM/SW:  [facility] [place] - per Maddi's request.    Additional Information:  10:25 AM  Per daughter Maddi's request: Referral manually sent to [place], [place] per Maddi's request (phone #[phone], Fax #[phone]).  Director of [facility] - Anastacio - confirms they have in-house hospice.  Anastacio's nursing team will review the referral.    11:55 AM   Estefany DONOVAN of [facility] is reviewing.  They will need a hospital bed and wheelchair for the patient prior to admission, which will be arranged by their in-house hospice team.  Informed Estefany DONOVAN that family is willing to private-pay.  Estefany DONOVAN to review with their team and will call back later.    2:30 PM  RNCM received a phone call from Selina of [facility] ([phone]).  [facility] works with [facility] in [place].  Hospice referral sent to Selina.  Selina to contact Estefany DONOVAN regarding facility's final decision on acceptance of this patient.    2:40 PM  RNCM received a phone call from Tanja,  of [facility] ([phone]).  Tanja requests an update on when pt discharges/to which facility.    3:52 PM   RNCM spoke to Selina of [facility].  Pt has been accepted by [facility] per Estefany DONOVAN of [facility].  They can accept pt at 10-11am  on Monday 1/23/23.  [facility] will order Jose, hospital bed, Broda chair for patient by Monday.    RNCM also spoke to daughter Maddi, who confirms ok to move into facility on Monday 1/23/23 - family is completing paperwork, agreeable to the private pay of the facility, agreeable to F stretcher transport and private pay.    ROSARIO Aguero

## 2023-01-19 NOTE — PROGRESS NOTES
[facility]    Progress Note - [facility] Residency Service   Date of Admission:  1/9/2023    Assessment & Plan   Son Huizar is a 87 year old male admitted on 1/9/2023. He has a past medical history of atrial fibrillation, essential hypertension, hyperlipidemia, lymphoma, CVA, dementia, and thalamic infarction. He was admitted for increased weakness, incontinence, and confusion. He is newly diagnosed with anemia. CT scan patient received on admission found new malignancy in his liver and pancreas.  After discussion with family and oncology, family elected to place patient in hospice care. Patient had stroke code called on 1/15 due to LUE weakness. Stroke code was de-escalated without intervention. Patient awaiting placement at long-term hospice care.     Diffuse large B cell lymphoma  Liver mass, newly diagnosed  Pancreatic mass, newly diagnosed  - After discussion with family they would like patient to be DNR/I and placed in hospice care  - Hospice consult placed    - Patient has been accepted at hospice.  Is scheduled to be admitted sometime next week.    Moderate Protein-Calorie Malnutrition  Patient has had steadily decreasing PO intake.  Subcutaneous Fat Loss:  Orbital region moderate depletion and Upperarm region moderater depletion  Muscle Loss:  Temporal region moderate depletion and Clavicle bone region modersate depletion  - Ensure included with meals    Confusion secondary to Progression of Chronic Dementia  According to daughter and wife, patient at baseline has some dementia, but is normally oriented to person, place, and time. Patient is oriented only to self.  - SLUMS score: 3/30       Diet: Room Service  Combination Diet Regular Diet  Snacks/Supplements Adult: Ensure Enlive; With Meals    DVT Prophylaxis: None  Ghotra Catheter: Not present  Fluids: PO  Lines: None     Cardiac Monitoring: None  Code Status: No CPR- Do NOT Intubate      Disposition Plan   Expected  Discharge Date: 01/20/2023      Destination: Hospice         The patient's care was discussed with the Attending Physician, MD Subhash Gao DO PGY1  Hospitalist Service  [facility]  Securely message with [employer] (more info)  Text page via [facility] Paging/Directory   ______________________________________________________________________    Interval History   Patient laying in bed and was very lethargic again during my encounter today. He seems very sad about his prognosis. Unable to move his left arm to command, but able to move all other extremities. He denies pain.    Physical Exam    Vital Signs: Temp: 97.3  F (36.3  C) Temp src: Oral BP: 123/69 Pulse: 82   Resp: 16 SpO2: 92 % O2 Device: None (Room air)    Weight: 162 lbs 14.72 oz  General appearance: Laying comfortably in bed, in no distress, lethargic, fatigued, pale appearing, wasted appearance.  Head: normocephalic, without obvious abnormalities  Eyes: conjunctivae/corneas clear  Ears: hearing grossly intact  Nose: nares normal, no drainage  Throat: lips, mucosa, and tongue normal  Lung: clear to auscultation bilaterally, no wheezing, coughing, or use of accessory muscles  Heart: regular rate and rhythm, S1, S2 normal, no murmur, click, rub, or gallop  Abdomen: soft, non-tender, bowel sounds present, no masses   Extremities: warm, dry, atraumatic, no cyanosis.  No motion of LUE. Able to move all other extremities.   Skin: pale appearing, texture, and turgor. No rashes or lesions  Neurologic:  Moves all extremities spontaneously.   Psychologic: orientated to self when awake       Data         Imaging results reviewed over the past 24 hrs:   No results found for this or any previous visit (from the past 24 hour(s)).

## 2023-01-19 NOTE — PLAN OF CARE
Problem: Confusion Chronic  Goal: Optimal Cognitive Function  Intervention: Minimize Injury Risk and Provide Safety    Problem: Stroke, Ischemic (Includes Transient Ischemic Attack)  Goal: Optimal Functional Ability  Outcome: Progressing      Pt resting comfortably during shift. Denying pain. Q2 turns. Oriented to self. Voiding spontaneously. No BM on shift.

## 2023-01-19 NOTE — PLAN OF CARE
"Pt more awake and alert compared to yesterday 1/18, but only to self. Pt is able to make conversation with staff. Trying to encourage oral intake when medical staff go into pt's room. Pt feeder with meals. Pt trying to pull at brief. Reposition pt every 2 hrs.   Mignon Garrido RN on 1/19/2023 at 5:23 PM   Problem: Plan of Care - These are the overarching goals to be used throughout the patient stay.    Goal: Plan of Care Review  Description: The Plan of Care Review/Shift note should be completed every shift.  The Outcome Evaluation is a brief statement about your assessment that the patient is improving, declining, or no change.  This information will be displayed automatically on your shift note.  Outcome: Progressing  Goal: Patient-Specific Goal (Individualized)  Description: You can add care plan individualizations to a care plan. Examples of Individualization might be:  \"Parent requests to be called daily at 9am for status\", \"I have a hard time hearing out of my right ear\", or \"Do not touch me to wake me up as it startles me\".  Outcome: Progressing  Goal: Absence of Hospital-Acquired Illness or Injury  Outcome: Progressing  Intervention: Identify and Manage Fall Risk  Recent Flowsheet Documentation  Taken 1/19/2023 1520 by Mignon Garrido, RN  Safety Promotion/Fall Prevention:   increase visualization of patient   increased rounding and observation   fall prevention program maintained   bed alarm on  Taken 1/19/2023 0800 by Mignon Garrido, RN  Safety Promotion/Fall Prevention:   increase visualization of patient   increased rounding and observation   fall prevention program maintained   bed alarm on  Intervention: Prevent Skin Injury  Recent Flowsheet Documentation  Taken 1/19/2023 1520 by Mignon Garrido, RN  Body Position: weight shifting  Taken 1/19/2023 0800 by Mignon Garrido, RN  Body Position: weight shifting  Goal: Readiness for Transition of Care  Outcome: Progressing   Goal Outcome " Evaluation:

## 2023-01-20 NOTE — PROGRESS NOTES
[facility]    Progress Note - [facility] Residency Service   Date of Admission:  1/9/2023    Assessment & Plan   Son Huizar is a 87 year old male admitted on 1/9/2023. He has a past medical history of atrial fibrillation, essential hypertension, hyperlipidemia, lymphoma, CVA, dementia, and thalamic infarction. He was admitted for increased weakness, incontinence, and confusion. He is newly diagnosed with anemia. CT scan patient received on admission found new malignancy in his liver and pancreas.  After discussion with family and oncology, family elected to place patient in hospice care. Patient had stroke code called on 1/15 due to LUE weakness. Stroke code was de-escalated without intervention. Patient awaiting placement at long-term hospice care.     Diffuse large B cell lymphoma  Liver mass, newly diagnosed  Pancreatic mass, newly diagnosed  After discussion with family they would like patient to be DNR/I and placed in hospice care  - Hospice consult placed    - Patient has been accepted at hospice.  Is scheduled to be admitted on Monday 1/23.    Moderate Protein-Calorie Malnutrition  Patient has had steadily decreasing PO intake.  Subcutaneous Fat Loss:  Orbital region moderate depletion and Upperarm region moderater depletion  Muscle Loss:  Temporal region moderate depletion and Clavicle bone region modersate depletion  - Ensure included with meals    Confusion secondary to Progression of Chronic Dementia  According to daughter and wife, patient at baseline has some dementia, but is normally oriented to person, place, and time. Patient is oriented only to self.  - SLUMS score: 3/30       Diet: Room Service  Combination Diet Regular Diet  Snacks/Supplements Adult: Ensure Enlive; With Meals    DVT Prophylaxis: None  Ghotra Catheter: Not present  Fluids: PO  Lines: None     Cardiac Monitoring: None  Code Status: No CPR- Do NOT Intubate      Disposition Plan   Expected Discharge  "Date: 01/23/2023      Destination: Hospice         The patient's care was discussed with the Attending Physician, MD Subhash Gao, DO PGY1  Hospitalist Service  [facility]  Securely message with [employer] (more info)  Text page via [facility] Paging/Directory   ______________________________________________________________________    Interval History   Patient laying in bed and was able to briefly converse with me. I let the patient know he is likely getting moved to a new facility on Monday, and he said \"okay, thank you\". He was moving his right arm and adjusting the sheets throughout the encounter. He appears to be working harder to speak compared to the past few days.     Physical Exam    Vital Signs: Temp: 99  F (37.2  C) Temp src: Oral BP: 108/64 Pulse: 103   Resp: 14 SpO2: 94 % O2 Device: None (Room air)    Weight: 162 lbs 14.72 oz  General appearance: Laying comfortably in bed, in no distress, lethargic, fatigued, pale appearing, wasted appearance.  Head: normocephalic, without obvious abnormalities  Eyes: conjunctivae/corneas clear  Ears: hearing grossly intact  Nose: nares normal, no drainage  Throat: lips, mucosa, and tongue normal  Lung: clear to auscultation bilaterally, no wheezing, coughing, or use of accessory muscles  Heart: regular rate and rhythm, S1, S2 normal, no murmur, click, rub, or gallop  Abdomen: soft, non-tender, bowel sounds present, no masses   Extremities: warm, dry, atraumatic, no cyanosis.  No motion of LUE. Able to move all other extremities.   Skin: pale appearing, texture, and turgor. No rashes or lesions  Neurologic:  Moves all extremities spontaneously.   Psychologic: orientated to self when awake       Data         Imaging results reviewed over the past 24 hrs:   No results found for this or any previous visit (from the past 24 hour(s)).  "

## 2023-01-20 NOTE — PLAN OF CARE
Problem: Stroke, Ischemic (Includes Transient Ischemic Attack)  Goal: Optimal Eating and Swallowing without Aspiration  Outcome: Progressing     Problem: Stroke, Ischemic (Includes Transient Ischemic Attack)  Goal: Effective Urinary Elimination  Outcome: Progressing   Pt resting in bed during shift. One episode of agitation occurred but pt was easily redirectable. One large episode of incontinence on NOC shift. Oriented to self. Q2 turns. Still no bowel movement.

## 2023-01-20 NOTE — PLAN OF CARE
Problem: Risk for Delirium  Goal: Improved Behavioral Control  Intervention: Minimize Safety Risk  Recent Flowsheet Documentation  Taken 1/20/2023 3364 by Makayla Farley RN  Enhanced Safety Measures: bed alarm set   Goal Outcome Evaluation:       Patient is comfort cares, has had minimal intake, denies pain, kept comfortable with turns and repositioning, no signs indicating he is pain. Awaiting placement.

## 2023-01-20 NOTE — PROGRESS NOTES
CLINICAL NUTRITION SERVICES - REASSESSMENT NOTE     Nutrition Prescription    RECOMMENDATIONS FOR MDs/PROVIDERS TO ORDER:  None    Malnutrition Status:    Moderate in chronic noted previously    Recommendations already ordered by Registered Dietitian (RD):  Continue diet and supplements as ordered    Future/Additional Recommendations:  Monitor intake     EVALUATION OF THE PROGRESS TOWARD GOALS   Diet: Regular  Nutrition Support: ensure enlive daily  Intake: variable intake - % noted     NEW FINDINGS   Per MD: patient is awaiting long term hospice care placement.  Patient is only alert to self and eats meals with feeding assistance. Receiving 3 nutritionally adequate meals per day + ensure daily.  GI: no BM since admission  Labs and meds reviewed.  Skin intact  Weight trends:  01/19/23 0354 73.9 kg (162 lb 14.7 oz) Bed scale   01/18/23 0353 73.6 kg (162 lb 4.1 oz) Bed scale   01/17/23 0424 76.3 kg (168 lb 3.4 oz) Bed scale   01/16/23 0447 73.3 kg (161 lb 9.6 oz) Bed scale   01/15/23 0442 72.4 kg (159 lb 9.8 oz) Bed scale   01/14/23 0404 72.4 kg (159 lb 9.8 oz) Bed scale   01/12/23 2350 72.8 kg (160 lb 7.9 oz) Bed scale   01/09/23 1016 73.5 kg (162 lb)      CURRENT NUTRITION DIAGNOSIS  Malnutrition related to cancer as evidenced by ongoing mild wt loss, reduced po, unclear on duration, and moderate fat and muscle loss -ongoing    INTERVENTIONS  Implementation  Continue diet and supplements as ordered    Goals  Patient to consume % of nutritionally adequate meal trays TID, or the equivalent with supplements/snacks. - progressing    Monitoring/Evaluation  Monitor intake and weight trends    Shanel Fernandez RDN, LD

## 2023-01-21 NOTE — PLAN OF CARE
Problem: Risk for Delirium  Goal: Optimal Coping  Outcome: Progressing   Goal Outcome Evaluation:  Alert to self only. VSS. Repositioned Q2 hr. Incontinent. No complaints or non verbal signs of pain. Family in room today. Minimal appetite, full assist with meals.

## 2023-01-21 NOTE — PLAN OF CARE
Problem: Plan of Care - These are the overarching goals to be used throughout the patient stay.    Goal: Plan of Care Review  Description: The Plan of Care Review/Shift note should be completed every shift.  The Outcome Evaluation is a brief statement about your assessment that the patient is improving, declining, or no change.  This information will be displayed automatically on your shift note.  Outcome: Progressing   Goal Outcome Evaluation:                      Pt alert to self. Denied pain throughout the night. Q2 repositioning. Incontinent of urine, checking brief with every turn. Appears calm and comfortable. Daily vitals obtained and they were stable.

## 2023-01-21 NOTE — PROGRESS NOTES
[facility]    Progress Note - [facility] Residency Service   Date of Admission:  1/9/2023    Assessment & Plan   Son Huizar is a 87 year old male admitted on 1/9/2023. He has a past medical history of atrial fibrillation, essential hypertension, hyperlipidemia, lymphoma, CVA, dementia, and thalamic infarction. He was admitted for increased weakness, incontinence, and confusion. He is newly diagnosed with anemia. CT scan patient received on admission found new malignancy in his liver and pancreas.  After discussion with family and oncology, family elected to place patient in hospice care. Patient had stroke code called on 1/15 due to LUE weakness. Stroke code was de-escalated without intervention. Patient awaiting placement at long-term hospice care.     Diffuse large B cell lymphoma  Liver mass, newly diagnosed  Pancreatic mass, newly diagnosed  After discussion with family they would like patient to be DNR/I and placed in hospice care  - Hospice consult placed    - Patient has been accepted at hospice.  Is scheduled to be admitted on Monday 1/23.    Moderate Protein-Calorie Malnutrition  Patient has had steadily decreasing PO intake.  Subcutaneous Fat Loss:  Orbital region moderate depletion and Upperarm region moderater depletion  Muscle Loss:  Temporal region moderate depletion and Clavicle bone region modersate depletion  - Ensure included with meals    Confusion secondary to Progression of Chronic Dementia  According to daughter and wife, patient at baseline has some dementia, but is normally oriented to person, place, and time. Patient is oriented only to self.  - SLUMS score: 3/30       Diet: Room Service  Combination Diet Regular Diet  Snacks/Supplements Adult: Ensure Enlive; With Meals    DVT Prophylaxis: None  Ghotra Catheter: Not present  Fluids: PO  Lines: None     Cardiac Monitoring: None  Code Status: No CPR- Do NOT Intubate      Disposition Plan   Expected Discharge  Date: 01/23/2023      Destination: Hospice         The patient's care was discussed with the Attending Physician, MD Subhash Gao, DO PGY1  Hospitalist Service  [facility]  Securely message with ViS (more info)  Text page via [facility] Paging/Directory   ______________________________________________________________________    Interval History   Patient laying in bed frequently tugging at sheet throughout the encounter.  He states he is not in any pain, and I informed him that pain medication is available if he does need it.  Patient states he does not want to take any medications.    Physical Exam    Vital Signs: Temp: 98  F (36.7  C) Temp src: Oral BP: 113/58 Pulse: 101   Resp: 16 SpO2: 93 % O2 Device: None (Room air)    Weight: 162 lbs 11.19 oz  General appearance: Laying comfortably in bed, in no distress, lethargic, fatigued, pale appearing, wasted appearance.  Head: normocephalic, without obvious abnormalities  Eyes: conjunctivae/corneas clear  Ears: hearing grossly intact  Nose: nares normal, no drainage  Throat: lips, mucosa, and tongue normal  Lung: clear to auscultation bilaterally, no wheezing, coughing, or use of accessory muscles.  Tachypnea present during my  Heart: regular rate and rhythm, S1, S2 normal, no murmur, click, rub, or gallop  Abdomen: soft, non-tender, bowel sounds present, no masses   Extremities: warm, dry, atraumatic, no cyanosis.  No motion of LUE. Able to move all other extremities.   Skin: pale appearing, texture, and turgor. No rashes or lesions  Neurologic:  Moves all extremities spontaneously.   Psychologic: orientated to self when awake       Data         Imaging results reviewed over the past 24 hrs:   No results found for this or any previous visit (from the past 24 hour(s)).

## 2023-01-22 NOTE — DISCHARGE SUMMARY
[facility]  Discharge Summary - Medicine & Pediatrics       Date of Admission:  1/9/2023  Date of Discharge:  1/23/2023  Discharging Provider: Subhash Mar DO  Attending Provider: Damon Peralta MD  Discharge Service: Hospitalist Service    Discharge Diagnoses   Pancreatic cancer  Liver cancer  CVA  Anemia  Moderate protein-calorie malnutrition  Progression of chronic demetia    Follow-ups Needed After Discharge       Unresulted Labs Ordered in the Past 30 Days of this Admission     No orders found from 12/10/2022 to 1/10/2023.          Discharge Disposition   Discharged to long-term care facility  Condition at discharge: Terminal    Hospital Course   Son Huizar was admitted on 1/9/2023 for acute anemia and confusion.  The following problems were addressed during his hospitalization:    Diffuse large B cell lymphoma  Liver mass, newly diagnosed  Pancreatic mass, newly diagnosed  Anemia  Confusion secondary to progression of chronic dementia  Moderate protein-calorie malnutrition  Patient initially presented to the ED due to confusion and anemia.  Patient was oriented only to self, and per family this was new.  He had been demented and when occasionally not be oriented to time at home, but family said that he was almost always oriented to self and place. SLUMS score was 3/30.  Initial hemoglobin in the ED was 7.9 and he had a positive fecal occult blood test.  His hemoglobin subsequently fell to 6.3 and he received 1 unit PRBC.  A GI bleed was suspected at this time so the patient received a CTA of the abdomen and pelvis.  The imaging did not reveal an acute bleed, however, it did reveal multiple masses in the patient's liver and a large mass in his pancreas that were not present on previous imaging.  Oncology was consulted and a discussion was had with the family about treatment versus comfort care.  It was decided that the patient would go into hospice and would not receive any  chemotherapy, biopsies, or radiation.  Hemoglobin eventually stabilized and an EGD was deferred.  Patient's home medications were stopped and he was only receiving a daily aspirin and pain medication as needed.  Patient slowly deteriorated throughout his hospital stay and was encouraged to continue eating.  Ensure was added to his meals to try and prevent wasting.    CVA   Patient had a stroke code called on the evening of 1/15 due to new onset left upper extremity weakness.  CTA of the head showed diffusely stenotic arteries with an acute versus subacute obstruction of a PCA branch.  Family was made aware of the event and after being presented with risk benefits of tPA, they decided against intervention.  Patient had no other deficits other than the left upper extremity weakness.  We temporarily resumed the patient's Eliquis, however after further discussion with our team we deemed that the risk of bleeding was too high to continue Eliquis.  Patient was started on a daily baby aspirin.    Consultations This Hospital Stay   GASTROENTEROLOGY IP CONSULT  CARE MANAGEMENT / SOCIAL WORK IP CONSULT  OCCUPATIONAL THERAPY ADULT IP CONSULT  HEMATOLOGY & ONCOLOGY IP CONSULT  INPATIENT HOSPICE ADULT CONSULT  CARE MANAGEMENT / SOCIAL WORK IP CONSULT  SPEECH LANGUAGE PATH ADULT IP CONSULT  NEUROLOGY IP STROKE CONSULT  OCCUPATIONAL THERAPY ADULT IP CONSULT    Code Status   No CPR- Do NOT Intubate       The patient was discussed with MD Subhash Velez DO PGY-1  Teaching Service  [address] [address]  Phone: [phone]  Fax: [phone]  ______________________________________________________________________    Physical Exam   Vital Signs: Temp: 98.4  F (36.9  C) Temp src: Oral BP: 101/61 Pulse: 90   Resp: 20 SpO2: 97 % O2 Device: None (Room air)    Weight: 159 lbs 6.28 oz  General appearance: Laying comfortably in bed, in no  distress, lethargic, fatigued, pale appearing, wasted appearance.  Head: normocephalic, without obvious abnormalities  Eyes: conjunctivae/corneas clear  Ears: hearing grossly intact  Nose: nares normal, no drainage  Throat: lips, mucosa, and tongue normal  Lung: clear to auscultation bilaterally, no wheezing, coughing, or use of accessory muscles.  Voice soft  Heart: regular rate with irregular rhythm, S1, S2 normal, no murmur, click, rub, or gallop  Abdomen: soft, non-tender, bowel sounds present, no masses   Extremities: warm, dry, atraumatic, no cyanosis.  No motion of LUE. Able to move all other extremities.   Skin: pale appearing, texture, and turgor. No rashes or lesions  Neurologic:  Moves all extremities spontaneously.   Psychologic: orientated to self when awake      Primary Care Physician   Garcia Dahl    Discharge Orders   No discharge procedures on file.    Significant Results and Procedures   Most Recent 3 CBC's:Recent Labs   Lab Test 01/15/23  2251 01/11/23  1456 01/11/23  0759 01/10/23  1924 01/10/23  1038 01/09/23  2345   WBC 19.1*  --   --   --  17.4* 16.3*   HGB 8.1* 7.9* 8.5*   < > 7.4* 6.3*   MCV 78  --   --   --  81 80     --   --   --  274 272    < > = values in this interval not displayed.     Most Recent 3 BMP's:Recent Labs   Lab Test 01/15/23  2251 01/15/23  2228 01/09/23  1230 07/28/22  1629     --  138 141   POTASSIUM 3.6  --  4.0 4.2   CHLORIDE 103  --  103 102   CO2 23  --  23 30*   BUN 18  --  24 21.7   CR 0.92  --  1.02 0.96   ANIONGAP 12  --  12 9   MALATHI 8.6  --  9.5 9.8   * 235* 192* 111*   ,   Results for orders placed or performed during the hospital encounter of 01/09/23   Head CT w/o contrast    Narrative    EXAM: CT HEAD W/O CONTRAST  LOCATION: [facility]  DATE/TIME: 1/9/2023 12:43 PM    INDICATION: Confusion.  COMPARISON: None.  TECHNIQUE: Routine CT Head without IV contrast. Multiplanar reformats. Dose reduction techniques  were used.    FINDINGS:  INTRACRANIAL CONTENTS: No acute intracranial hemorrhage. No extra-axial fluid collection. No mass effect or midline shift. Chronic infarcts involving the left thalamus and posterior limb of the right internal capsule. Moderate presumed chronic small   vessel ischemic changes. Cerebral volume loss more pronounced in the bilateral temporal lobes.     VISUALIZED ORBITS/SINUSES/MASTOIDS: Prior bilateral cataract surgery. Visualized portions of the orbits are otherwise unremarkable. No paranasal sinus mucosal disease. Scattered fluid/membrane thickening in the left mastoid air cells. No apparent mass in   the posterior nasopharynx or skull base.    BONES/SOFT TISSUES: No acute abnormality.      Impression    IMPRESSION:  1.  No acute intracranial hemorrhage.  2.  Chronic lacunar type infarcts involving the left thalamus and posterior limb of the right internal capsule.  3.  Cerebral volume loss more pronounced in the temporal lobes, a finding which can be seen with Alzheimer's dementia.  4.  Moderate burden presumed sequela of chronic small vessel ischemic disease.   CTA Abdomen Pelvis with Contrast    Narrative    EXAM: CTA ABDOMEN PELVIS WITH CONTRAST  LOCATION: [facility]  DATE/TIME: 1/9/2023 3:58 PM    INDICATION: Anemia. GI bleed. Anticoagulation. Sepsis.  COMPARISON: 8/17/2022, 5/27/2020  TECHNIQUE: CT angiogram abdomen pelvis during arterial phase of injection of IV contrast. 2D and 3D MIP reconstructions were performed by the CT technologist. Dose reduction techniques were used.  CONTRAST: Isovue 370 90mL    FINDINGS:  ANGIOGRAM ABDOMEN/PELVIS: There was suboptimal opacification of the arteries due to contrast extravasation during the injection. No active contrast extravasation into the bowel or definite evidence of GI bleeding. However, sensitivity is significantly   limited. Moderate calcified plaque in the abdominal aorta. The mesenteric and renal arteries are  patent. Iliac arteries are patent.    LOWER CHEST: A small right pleural effusion is unchanged from 8/17/2022. Rounded atelectasis in the right lower lobe posterior medially is also unchanged.    HEPATOBILIARY: There are innumerable peripherally enhancing masses throughout the liver measuring up to 3.0 cm in diameter which are new from 8/17/2022 and highly suspicious for malignancy. The pattern is atypical for lymphoma and other etiologies should   be considered.    PANCREAS: Diffuse pancreatic calcification. Soft tissue fullness in the pancreatic head measuring approximately 3 cm.    SPLEEN: Normal.    ADRENAL GLANDS: Normal.    KIDNEYS/BLADDER: Small benign left renal cyst. No follow-up needed. Few intrarenal calcifications measuring up to 3 mm may represent arterial calcification and/or small nonobstructing calyceal stones. No ureteral stones or hydronephrosis.    BOWEL: The stomach is decompressed. The small and large intestines are normal caliber. Few sigmoid diverticula without active inflammation.    LYMPH NODES: No adenopathy.    PELVIC ORGANS: Moderate enlargement the prostate gland.    MUSCULOSKELETAL: Degenerative changes in the lumbar spine.      Impression    IMPRESSION:  1.  The study is significantly limited by contrast extravasation during the injection with reduced opacification of the arterial system. No definite evidence of active GI bleeding.  2.  There are innumerable peripherally enhancing masses throughout the liver measuring up to 3.0 cm in diameter. These are new from 8/17/2022 and are highly suspicious for malignancy. The pattern is atypical for lymphoma and other etiologies should be   considered.  3.  There is soft tissue fullness in the pancreatic head measuring approximately 3 cm. This is indeterminate and could represent a primary pancreatic malignancy or metastasis. This finding is new from 2020 and not adequately imaged on the most recent   chest CT. An MRI of the pancreas without  and with IV gadolinium can be considered for further evaluation.  4.  Diffuse pancreatic calcification consistent with chronic pancreatitis.  5.  Small right pleural effusion and rounded atelectasis in the right lower lobe are unchanged from 8/17/2022.   XR Chest Port 1 View    Narrative    EXAM: XR CHEST PORT 1 VIEW  LOCATION: [facility]  DATE/TIME: 1/9/2023 3:29 PM    INDICATION: Sepsis.  COMPARISON: Chest CT 07/07/2022 Chest radiograph 01/13/2022.      Impression    IMPRESSION: Small left and trace right pleural effusions. Low lung volumes. Increased interstitial markings within the right greater than left mid to lower lungs appear similar to the prior CT and may reflect interstitial edema. No new airspace   opacities. Unchanged cardiomegaly. No pneumothorax.   CTA Head Neck with Contrast    Narrative    EXAM: CTA HEAD NECK W CONTRAST  LOCATION: [facility]  DATE/TIME: 1/15/2023 10:53 PM    INDICATION: Left-sided weakness. Code stroke.  COMPARISON: CT head 01/09/2023  CONTRAST: 75ml Isovue 370  TECHNIQUE: Head and neck CT angiogram with IV contrast. Noncontrast head CT followed by axial helical CT images of the head and neck vessels obtained during the arterial phase of intravenous contrast administration. Axial 2D reconstructed images and   multiplanar 3D MIP reconstructed images of the head and neck vessels were performed by the technologist. Dose reduction techniques were used. All stenosis measurements made according to NASCET criteria unless otherwise specified.    FINDINGS:   NONCONTRAST HEAD CT:   INTRACRANIAL CONTENTS: No acute intracranial hemorrhage, extraaxial collection, or midline shift.  Right occipital lobe PCA territory prominent cortical and subcortical hypoattenuation concerning for acute or subacute infarct. No additional CT evidence   for an acute infarct. Aspect score 10. Severe presumed chronic small vessel ischemic changes. Left thalamus  small chronic infarct. Right posterior limb internal capsule small chronic infarct. Left superior lateral cerebellum small chronic infarct.   Moderate to severe ventriculomegaly with ventricles enlarged disproportionate to the sulcal atrophy. Findings may reflect normal pressure hydrocephalus, central brain atrophy, or extraventricular noncommunicating hydrocephalus. Please correlate   clinically.  Global left greater than the right temporal lobe atrophy concerning for underlying neurodegenerative process including but not limited to Alzheimer's disease. Please correlate clinically.      HEAD CTA:  Right M1 segment mild stenosis.     Left proximal M2 segment mild stenosis.    Right A2 segment tandem severe stenoses.    Mid basilar artery mild stenosis.    Left proximal superior cerebral artery severe stenosis.    Left proximal V4 segment moderate stenosis.    Right P2 occlusion with reconstitution. Right distal P2 segment severe stenosis. Right P3 branch artery occlusion likely present.    Left proximal P2 segment severe high-grade stenosis with only a thin wisp of enhancement.    No additional significant stenosis/occlusion.      No brain aneurysm. No AVM/AVF.    Standard [place] anatomy.     Dominant left and smaller right vertebral artery contribute to a normal basilar artery.     DURAL VENOUS SINUSES: Not well evaluated on a technical basis.      NECK CTA:  RIGHT CAROTID: No significant stenosis/occlusion. No dissection.    LEFT CAROTID: No significant stenosis/occlusion. No dissection.    VERTEBRAL ARTERIES:  Right vertebral artery origin severe stenosis.     Right proximal V2 segment moderate stenosis secondary to extrinsic compression.    Right horizontal V3 segment mild stenosis with mural wall thickening could reflect noncalcified plaque or nonocclusive dissection.    Otherwise, no significant stenosis, occlusion, dissection.      Dominant left and smaller right vertebral arteries.    AORTIC ARCH:  Classic aortic arch anatomy with no significant stenosis at the origin of the great vessels.    ARTERIAL PLAQUE: Calcified/noncalcified plaque scattered throughout the arterial system.    NONVASCULAR STRUCTURES:   Left mastoid air cells moderate patchy opacification. Mild to moderate right pleural effusion. Small left pleural effusion. Degenerative changes noted within the spine.        Impression     IMPRESSION:   HEAD CT:  1.  No acute intracranial hemorrhage.   2.  Right occipital lobe PCA territory prominent cortical and subcortical acute or subacute infarct.    3.  No additional CT evidence for an acute infarct. Aspect score 10.  4.  Chronic intracranial changes described above.    Dr Steele was notified by Dr Samuel Cruz at  11:18 PM 01/15/2023.       HEAD CTA:  1.  Right P2 occlusion with reconstitution. Right distal P2 segment severe stenosis. Right P3 branch artery occlusion likely present.  2.  Left proximal P2 segment severe high-grade stenosis with only a thin wisp of enhancement.  3.  Multiple additional intracranial arteries demonstrate stenoses ranging from mild to severe. Please above for discussion.      NECK CTA:  1.  Right horizontal V3 segment mild stenosis with mural wall thickening could reflect noncalcified plaque or nonocclusive dissection.  2.  Right proximal V2 segment moderate stenosis secondary to extrinsic compression.  3.  Right vertebral artery origin severe stenosis.   4.  Otherwise, no significant stenosis, occlusion, dissection.    Dr Maureen Garg was notified by Dr Samuel Cruz at  11:35 PM 01/15/2023.       Discharge Medications   Current Discharge Medication List      CONTINUE these medications which have NOT CHANGED    Details   alpha lipoic acid 100 mg cap [ALPHA LIPOIC ACID 100 MG CAP] Take 100 mg by mouth daily.      ascorbic acid, vitamin C, (ASCORBIC ACID WITH NATHALIE HIPS) 500 MG tablet [ASCORBIC ACID, VITAMIN C, (ASCORBIC ACID WITH NATHALIE HIPS) 500 MG TABLET] Take 500 mg by  mouth daily.      b complex vitamins tablet [B COMPLEX VITAMINS TABLET] Take 1 tablet by mouth daily. B Complex 50 Tablet (B Complex Vitamins) Give 1  tablet by mouth one time a day for Supplement             cholecalciferol, vitamin D3, 1,000 unit tablet [CHOLECALCIFEROL, VITAMIN D3, 1,000 UNIT TABLET] Take 2,000 Units by mouth daily.      clopidogrel (PLAVIX) 75 MG tablet Take 75 mg by mouth daily      coenzyme Q10 (CO Q-10) 100 mg capsule [COENZYME Q10 (CO Q-10) 100 MG CAPSULE] Take 100 mg by mouth daily.      ELIQUIS 5 mg Tab tablet [ELIQUIS 5 MG TAB TABLET] TAKE 1 TABLET BY MOUTH TWICE DAILY  Qty: 60 tablet, Refills: 4    Associated Diagnoses: Chronic atrial fibrillation (H)      fluticasone (FLONASE) 50 mcg/actuation nasal spray [FLUTICASONE (FLONASE) 50 MCG/ACTUATION NASAL SPRAY] Apply 1 spray into each nostril daily as needed.             lactobacillus rhamnosus, GG, (CULTURELL) capsule Take 1 capsule by mouth daily      levOCARNitine 500 mg Tab Take 500 mg by mouth daily as needed      magnesium 200 mg Tab [MAGNESIUM 200 MG TAB] Take 200 mg by mouth daily.      metoprolol tartrate (LOPRESSOR) 25 MG tablet [METOPROLOL TARTRATE (LOPRESSOR) 25 MG TABLET] Take 25 mg by mouth daily.             Multiple Vitamins-Minerals (PRESERVISION AREDS 2+MULTI VIT PO) Take 1 capsule by mouth 2 times daily      neomycin-polymixin-dexamethasone (MAXITROL) ophthalmic ointment Place into both eyes At Bedtime      omega-3/dha/epa/fish oil (FISH OIL-OMEGA-3 FATTY ACIDS) 300-1,000 mg capsule Take 2 g by mouth daily      peg 400-propylene glycol PF (SYSTANE) 0.4-0.3 % Dpet [-PROPYLENE GLYCOL PF (SYSTANE) 0.4-0.3 % DPET] Administer 1 drop to both eyes 4 (four) times a day as needed.      SAW PALMETTO ORAL Take 1 capsule by mouth daily       simvastatin (ZOCOR) 40 MG tablet Take 40 mg by mouth At Bedtime      zinc gluconate 30 mg Tab Take 30 mg by mouth daily as needed            Allergies   Allergies   Allergen Reactions      Pollen [Pollen Extract] Itching     Runny nose, sneezing, itchy eyes      Other Environmental Allergy Itching     Running nose. Itchy eye, DUST     Ragweed [Ragweeds] Itching     Running nose, itchy eyes

## 2023-01-22 NOTE — PROGRESS NOTES
Occupational Therapy Discharge Summary    Reason for therapy discharge:    Discharged to hospice care.    Progress towards therapy goal(s). See goals on Care Plan in [facility] electronic health record for goal details.  Goals partially met.  Barriers to achieving goals:   limited tolerance for therapy.    Therapy recommendation(s):    No further therapy is recommended.

## 2023-01-22 NOTE — PROGRESS NOTES
Care Management Follow Up    Length of Stay (days): 13    Expected Discharge Date: 01/23/2023     Concerns to be Addressed: discharge planning     Patient plan of care discussed at interdisciplinary rounds: Yes    Anticipated Discharge Disposition: Long Term Care  Disposition Comments: long term care with hospice- facility TBD  Anticipated Discharge Services: Other (see comment) (Hospice)  Anticipated Discharge DME:  (TBD)    Patient/family educated on Medicare website which has current facility and service quality ratings: yes  Education Provided on the Discharge Plan:  Yes, rich Linda  Patient/Family in Agreement with the Plan: yes    Private pay costs discussed: transportation costs    Additional Information:  11:42 AM  F stretcher arranged for 1/23/23 at 10:00am to [facility] in [place] ([phone], Fax [phone]).  PCS done.  [facility] (the facility's in-house hospice team) will be following and was aware that pt was to be expected at the facility between 10 and 11am on Monday 1/23/23.  Update given to rich Linda, Charge RN, and SHALOM.    Mario Dugan RN/CM

## 2023-01-22 NOTE — PLAN OF CARE
Problem: Confusion Chronic  Goal: Optimal Cognitive Function  Intervention: Minimize Injury Risk and Provide Safety  Recent Flowsheet Documentation  Taken 1/21/2023 1700 by Didier Espino RN  Enhanced Safety Measures: bed alarm set   Goal Outcome Evaluation:         Oriented to self only. Appears to be comfortable. Repositioning every 2 hours. Incontinent of urine. Poor oral intake.

## 2023-01-22 NOTE — PROGRESS NOTES
[facility]    Progress Note - [facility]idency Service   Date of Admission:  1/9/2023    Assessment & Plan   Son Huizar is a 87 year old male admitted on 1/9/2023. He has a past medical history of atrial fibrillation, essential hypertension, hyperlipidemia, lymphoma, CVA, dementia, and thalamic infarction. He was admitted for increased weakness, incontinence, and confusion. He is newly diagnosed with anemia. CT scan patient received on admission found new malignancy in his liver and pancreas.  After discussion with family and oncology, family elected to place patient in hospice care. Patient had stroke code called on 1/15 due to LUE weakness. Stroke code was de-escalated without intervention. Patient awaiting placement at long-term hospice care.     Diffuse large B cell lymphoma  Liver mass, newly diagnosed  Pancreatic mass, newly diagnosed  After discussion with family they would like patient to be DNR/I and placed in hospice care  - Hospice consult placed    - Patient has been accepted at hospice.  Is scheduled to be admitted on Monday 1/23.    Moderate Protein-Calorie Malnutrition  Patient has had steadily decreasing PO intake.  Subcutaneous Fat Loss:  Orbital region moderate depletion and Upperarm region moderater depletion  Muscle Loss:  Temporal region moderate depletion and Clavicle bone region modersate depletion  - Ensure included with meals  - Encourage PO intake    Confusion secondary to Progression of Chronic Dementia  According to daughter and wife, patient at baseline has some dementia, but is normally oriented to person, place, and time. Patient is oriented only to self.  - SLUMS score: 3/30       Diet: Room Service  Combination Diet Regular Diet  Snacks/Supplements Adult: Ensure Enlive; With Meals    DVT Prophylaxis: None  Ghotra Catheter: Not present  Fluids: PO  Lines: None     Cardiac Monitoring: None  Code Status: No CPR- Do NOT Intubate      Disposition Plan    Expected Discharge Date: 01/23/2023      Destination: Hospice         The patient's care was discussed with the Attending Physician, MD Subhash Gao DO PGY1  Hospitalist Service  [facility]  Securely message with [employer] (more info)  Text page via [employer] Paging/Directory   ______________________________________________________________________    Interval History   Patient laying in bed during my encounter today.  He was a little more difficult to hear when talking and seemed weaker than yesterday.  No acute events overnight.  Patient denies any pain.    Physical Exam    Vital Signs: Temp: 98.1  F (36.7  C) Temp src: Oral BP: 103/65 Pulse: 89   Resp: 24 SpO2: 98 % O2 Device: None (Room air)    Weight: 159 lbs 6.28 oz  General appearance: Laying comfortably in bed, in no distress, lethargic, fatigued, pale appearing, wasted appearance.  Head: normocephalic, without obvious abnormalities  Eyes: conjunctivae/corneas clear  Ears: hearing grossly intact  Nose: nares normal, no drainage  Throat: lips, mucosa, and tongue normal  Lung: clear to auscultation bilaterally, no wheezing, coughing, or use of accessory muscles.  Voice soft  Heart: regular rate with irregular rhythm, S1, S2 normal, no murmur, click, rub, or gallop  Abdomen: soft, non-tender, bowel sounds present, no masses   Extremities: warm, dry, atraumatic, no cyanosis.  No motion of LUE. Able to move all other extremities.   Skin: pale appearing, texture, and turgor. No rashes or lesions  Neurologic:  Moves all extremities spontaneously.   Psychologic: orientated to self when awake       Data         Imaging results reviewed over the past 24 hrs:   No results found for this or any previous visit (from the past 24 hour(s)).

## 2023-01-22 NOTE — PLAN OF CARE
Problem: Risk for Delirium  Goal: Optimal Coping  1/22/2023 1508 by Magdalene Aj, RN  Outcome: Progressing  1/22/2023 1507 by Magdalene Aj, RN  Outcome: Not Progressing   Goal Outcome Evaluation:  VSS. Reposition Q 2 hr. Incontinent of bladder. Family in room today. Jelly meals eaten, full assists with meals.

## 2023-01-22 NOTE — PLAN OF CARE
Problem: Plan of Care - These are the overarching goals to be used throughout the patient stay.    Goal: Absence of Hospital-Acquired Illness or Injury  Outcome: Progressing  Intervention: Identify and Manage Fall Risk  Recent Flowsheet Documentation  Taken 1/22/2023 0005 by Katarina Yañez RN  Safety Promotion/Fall Prevention:   activity supervised   bed alarm on   assistive device/personal items within reach   fall prevention program maintained   increased rounding and observation   nonskid shoes/slippers when out of bed   patient and family education   room door open   room near nurse's station   safety round/check completed  Intervention: Prevent Skin Injury  Recent Flowsheet Documentation  Taken 1/22/2023 0600 by Katarina Yañez RN  Body Position:   turned   right   log-rolled  Taken 1/22/2023 0404 by Katarina Yañez RN  Body Position:   turned   left  Taken 1/22/2023 0203 by Katarina Yañez RN  Body Position:   turned   right  Taken 1/22/2023 0005 by Katarina Yañez RN  Body Position:   turned   left   Goal Outcome Evaluation:                      Pt calm and comfortable throughout shift. [id number]. Incontinent of urine and voided x 2. Mepilex in place to coccyx for blanchable redness.

## 2023-01-23 NOTE — PLAN OF CARE
Problem: Plan of Care - These are the overarching goals to be used throughout the patient stay.    Goal: Plan of Care Review  Description: The Plan of Care Review/Shift note should be completed every shift.  The Outcome Evaluation is a brief statement about your assessment that the patient is improving, declining, or no change.  This information will be displayed automatically on your shift note.  Outcome: Progressing   Goal Outcome Evaluation:         Fair food intake this evening, continues to need full assistance with feedings. Repositioning every 2 hours. Incontinent of bladder. Received dilaudid iv 0.2 mg once this shift.

## 2023-01-23 NOTE — PLAN OF CARE
Goal Outcome Evaluation:                        Problem: Plan of Care - These are the overarching goals to be used throughout the patient stay.    Goal: Absence of Hospital-Acquired Illness or Injury  Outcome: Progressing  Intervention: Identify and Manage Fall Risk  Recent Flowsheet Documentation  Taken 1/23/2023 0012 by Katarina Yañez, RN  Safety Promotion/Fall Prevention:   activity supervised   assistive device/personal items within reach   bed alarm on   fall prevention program maintained   increased rounding and observation   nonskid shoes/slippers when out of bed   patient and family education   room door open   safety round/check completed  Intervention: Prevent Skin Injury  Recent Flowsheet Documentation  Taken 1/23/2023 0613 by Katarina Yañez, RN  Body Position:   turned   left   log-rolled   supine, head elevated   weight shifting  Taken 1/23/2023 0012 by Katarina Yañez, RN  Body Position:   turned   right   weight shifting   log-rolled   heels elevated   ]    Pt calm and cooperative throughout the night. He received a bed bath and a shower cap. Lotion applied bilateral feet. No new skin issues noted. He has blanchable redness on coccyx covered with a 4x4 mepilex. Denied pain when asked, does grimace with repositioning but quickly calms after settled in bed and comfortable. Left sided weakness noted, supporting LUE with a pillow. Incontinent of urine, checking brief with each turn, pt is voiding adequately with brief changed x 3 overnight. Discharge is planned for today to [facility].

## 2023-01-23 NOTE — PLAN OF CARE
Goal Outcome Evaluation:  Pt pleasant and cooperative throughout this morning.  He will discharge to [facility] with hospice.  Some yelling out when he had to multiple repositioning to get him dressed and when his IV was removed due it pulling his arm hair.

## 2023-01-24 NOTE — PROGRESS NOTES
[facility]    Background: Transitional Care Management program identified per system criteria and reviewed by [facility] team for possible outreach.    Assessment: Upon chart review, [facility] Team member will not proceed with patient outreach related to this episode of Transitional Care Management program due to reason below:    Non-[facility] TCU: [facility] team member noted patient discharged to TCU/ARU/LTACH. Patient is not established with a [facility] currently supported by [facility]Care Coordination therefore handoff to Primary Care-Care Coordination is not appropriate at this time.    Plan: Transitional Care Management episode addressed appropriately per reason noted above.      ALFREDO Luke  [facility] [place], [facility]    *Connected Care Resource Team does NOT follow patient ongoing. Referrals are identified based on internal discharge reports and the outreach is to ensure patient has an understanding of their discharge instructions.

## 2023-07-26 NOTE — PLAN OF CARE
Problem: Confusion Chronic  Goal: Optimal Cognitive Function  Outcome: Not Progressing   Goal Outcome Evaluation:             Pt is alert to self only. Denies pain. Agitated with cares at times. Up w/ A2 GB and cane-refuses walker. Voiding in BR, although is incontinent at times. PIV SL. Takes pills whole with water. Plan for hospice consult. Discharge is pending.             Statement Selected

## (undated) DEVICE — SOL WATER IRRIG 1000ML BOTTLE 2F7114

## (undated) DEVICE — SUTURE VICRYL+ 4-0 UNDYED PS-2 VCP496H

## (undated) DEVICE — SU PROLENE 2-0 SHDA 36" 8523H

## (undated) DEVICE — SOL NACL 0.9% IRRIG 1000ML BOTTLE 2F7124

## (undated) DEVICE — ESU PENCIL SMOKE EVAC W/ROCKER SWITCH 0703-047-000

## (undated) DEVICE — GOWN IMPERVIOUS BREATHABLE 2XL/XLONG

## (undated) DEVICE — DRSG STERI STRIP 1X5" R1548

## (undated) DEVICE — GLOVE BIOGEL PI SZ 8.0 40880

## (undated) DEVICE — SUTURE VICRYL+ 2-0 27IN SH UND VCP417H

## (undated) DEVICE — TUBE PENROSE 3/8 X 12 STRL LTX 0912020

## (undated) DEVICE — DRAPE OR LAPAROTOMY KC 89228*

## (undated) DEVICE — GLOVE UNDER INDICATOR PI SZ 7.0 LF 41670

## (undated) DEVICE — PLATE GROUNDING ADULT W/CORD 9165L

## (undated) DEVICE — CUSTOM PACK MINOR SBA5BMNHEA

## (undated) DEVICE — GLOVE BIOGEL PI INDICATOR 8.0 LF 41680

## (undated) DEVICE — SU ETHIBOND 0 MO-7 CR 8X18" CX41D

## (undated) DEVICE — PREP DURAPREP 26ML APL 8630

## (undated) DEVICE — DRSG GAUZE 4X4" 3033

## (undated) DEVICE — GLOVE SURG PI ULTRA TOUCH M SZ 7-1/2 LF